# Patient Record
Sex: MALE | Race: OTHER | Employment: FULL TIME | URBAN - METROPOLITAN AREA
[De-identification: names, ages, dates, MRNs, and addresses within clinical notes are randomized per-mention and may not be internally consistent; named-entity substitution may affect disease eponyms.]

---

## 2020-03-15 ENCOUNTER — HOSPITAL ENCOUNTER (EMERGENCY)
Age: 50
Discharge: HOME | End: 2020-03-15
Attending: EMERGENCY MEDICINE
Payer: COMMERCIAL

## 2020-03-15 ENCOUNTER — APPOINTMENT (OUTPATIENT)
Dept: GENERAL RADIOLOGY | Age: 50
End: 2020-03-15
Payer: COMMERCIAL

## 2020-03-15 VITALS
HEIGHT: 69 IN | WEIGHT: 241.3 LBS | DIASTOLIC BLOOD PRESSURE: 88 MMHG | HEART RATE: 78 BPM | OXYGEN SATURATION: 97 % | BODY MASS INDEX: 35.74 KG/M2 | RESPIRATION RATE: 18 BRPM | TEMPERATURE: 97.8 F | SYSTOLIC BLOOD PRESSURE: 134 MMHG

## 2020-03-15 PROCEDURE — 72040 X-RAY EXAM NECK SPINE 2-3 VW: CPT

## 2020-03-15 PROCEDURE — 71046 X-RAY EXAM CHEST 2 VIEWS: CPT

## 2020-03-15 PROCEDURE — 99283 EMERGENCY DEPT VISIT LOW MDM: CPT

## 2020-03-15 PROCEDURE — 72100 X-RAY EXAM L-S SPINE 2/3 VWS: CPT

## 2020-03-15 RX ORDER — IBUPROFEN 800 MG/1
800 TABLET ORAL EVERY 6 HOURS PRN
Qty: 21 TABLET | Refills: 0 | Status: ON HOLD | OUTPATIENT
Start: 2020-03-15 | End: 2020-09-23

## 2020-03-15 RX ORDER — ACETAMINOPHEN AND CODEINE PHOSPHATE 300; 30 MG/1; MG/1
1 TABLET ORAL EVERY 6 HOURS PRN
Qty: 12 TABLET | Refills: 0 | Status: SHIPPED | OUTPATIENT
Start: 2020-03-15 | End: 2020-03-18

## 2020-03-15 RX ORDER — METHOCARBAMOL 750 MG/1
750 TABLET, FILM COATED ORAL 4 TIMES DAILY
Qty: 40 TABLET | Refills: 0 | Status: SHIPPED | OUTPATIENT
Start: 2020-03-15 | End: 2020-03-25

## 2020-03-15 ASSESSMENT — PAIN DESCRIPTION - PAIN TYPE: TYPE: ACUTE PAIN

## 2020-03-15 ASSESSMENT — ENCOUNTER SYMPTOMS
COLOR CHANGE: 0
COUGH: 0
RHINORRHEA: 0
SHORTNESS OF BREATH: 0
SINUS PRESSURE: 0
VOMITING: 0
SORE THROAT: 0
ABDOMINAL PAIN: 0
WHEEZING: 0
NAUSEA: 0
DIARRHEA: 0
BACK PAIN: 1
CONSTIPATION: 0

## 2020-03-15 ASSESSMENT — PAIN SCALES - GENERAL: PAINLEVEL_OUTOF10: 10

## 2020-03-15 ASSESSMENT — PAIN DESCRIPTION - LOCATION: LOCATION: BACK;NECK

## 2020-03-15 ASSESSMENT — PAIN DESCRIPTION - DESCRIPTORS: DESCRIPTORS: THROBBING;ACHING

## 2020-03-15 ASSESSMENT — PAIN DESCRIPTION - ORIENTATION: ORIENTATION: LOWER

## 2020-03-15 ASSESSMENT — PAIN DESCRIPTION - FREQUENCY: FREQUENCY: CONTINUOUS
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
## 2020-09-22 ENCOUNTER — HOSPITAL ENCOUNTER (INPATIENT)
Age: 50
LOS: 4 days | Discharge: HOME HEALTH | DRG: 137 | End: 2020-09-26
Attending: INTERNAL MEDICINE | Admitting: INTERNAL MEDICINE
Payer: MEDICAID

## 2020-09-22 ENCOUNTER — APPOINTMENT (OUTPATIENT)
Dept: GENERAL RADIOLOGY | Age: 50
DRG: 137 | End: 2020-09-22
Attending: INTERNAL MEDICINE
Payer: MEDICAID

## 2020-09-22 PROBLEM — J18.9 MULTIFOCAL PNEUMONIA: Status: ACTIVE | Noted: 2020-09-22

## 2020-09-22 PROBLEM — K81.0 ACUTE CHOLECYSTITIS: Status: ACTIVE | Noted: 2020-09-22

## 2020-09-22 PROBLEM — J85.1 ABSCESS OF LOWER LOBE OF RIGHT LUNG WITH PNEUMONIA (HCC): Status: ACTIVE | Noted: 2020-09-22

## 2020-09-22 PROBLEM — E66.9 DIABETES MELLITUS TYPE 2 IN OBESE (HCC): Status: ACTIVE | Noted: 2020-09-22

## 2020-09-22 PROBLEM — J18.9 PNEUMONIA: Status: ACTIVE | Noted: 2020-09-22

## 2020-09-22 PROBLEM — E66.09 OBESITY DUE TO EXCESS CALORIES: Status: ACTIVE | Noted: 2020-09-22

## 2020-09-22 PROBLEM — E11.69 DIABETES MELLITUS TYPE 2 IN OBESE (HCC): Status: ACTIVE | Noted: 2020-09-22

## 2020-09-22 LAB
CREAT SERPL-MCNC: 0.47 MG/DL (ref 0.7–1.2)
GFR AFRICAN AMERICAN: >60 ML/MIN
GFR NON-AFRICAN AMERICAN: >60 ML/MIN
GFR SERPL CREATININE-BSD FRML MDRD: ABNORMAL ML/MIN/{1.73_M2}
GFR SERPL CREATININE-BSD FRML MDRD: ABNORMAL ML/MIN/{1.73_M2}
GLUCOSE BLD-MCNC: 224 MG/DL (ref 75–110)
SARS-COV-2, RAPID: NOT DETECTED
SARS-COV-2: NORMAL
SARS-COV-2: NORMAL
SOURCE: NORMAL

## 2020-09-22 PROCEDURE — 82565 ASSAY OF CREATININE: CPT

## 2020-09-22 PROCEDURE — 6370000000 HC RX 637 (ALT 250 FOR IP): Performed by: NURSE PRACTITIONER

## 2020-09-22 PROCEDURE — 2500000003 HC RX 250 WO HCPCS: Performed by: SURGERY

## 2020-09-22 PROCEDURE — 86481 TB AG RESPONSE T-CELL SUSP: CPT

## 2020-09-22 PROCEDURE — 87641 MR-STAPH DNA AMP PROBE: CPT

## 2020-09-22 PROCEDURE — 2060000000 HC ICU INTERMEDIATE R&B

## 2020-09-22 PROCEDURE — 36415 COLL VENOUS BLD VENIPUNCTURE: CPT

## 2020-09-22 PROCEDURE — 2580000003 HC RX 258: Performed by: INTERNAL MEDICINE

## 2020-09-22 PROCEDURE — 87040 BLOOD CULTURE FOR BACTERIA: CPT

## 2020-09-22 PROCEDURE — 2580000003 HC RX 258: Performed by: NURSE PRACTITIONER

## 2020-09-22 PROCEDURE — 82947 ASSAY GLUCOSE BLOOD QUANT: CPT

## 2020-09-22 PROCEDURE — 6360000002 HC RX W HCPCS: Performed by: INTERNAL MEDICINE

## 2020-09-22 PROCEDURE — U0002 COVID-19 LAB TEST NON-CDC: HCPCS

## 2020-09-22 PROCEDURE — 71045 X-RAY EXAM CHEST 1 VIEW: CPT

## 2020-09-22 PROCEDURE — 6360000002 HC RX W HCPCS: Performed by: NURSE PRACTITIONER

## 2020-09-22 PROCEDURE — 99223 1ST HOSP IP/OBS HIGH 75: CPT | Performed by: INTERNAL MEDICINE

## 2020-09-22 PROCEDURE — 6360000002 HC RX W HCPCS: Performed by: SURGERY

## 2020-09-22 RX ORDER — SODIUM CHLORIDE 9 MG/ML
INJECTION, SOLUTION INTRAVENOUS CONTINUOUS
Status: DISCONTINUED | OUTPATIENT
Start: 2020-09-22 | End: 2020-09-24

## 2020-09-22 RX ORDER — SODIUM CHLORIDE 0.9 % (FLUSH) 0.9 %
10 SYRINGE (ML) INJECTION PRN
Status: DISCONTINUED | OUTPATIENT
Start: 2020-09-22 | End: 2020-09-26 | Stop reason: HOSPADM

## 2020-09-22 RX ORDER — SODIUM CHLORIDE 0.9 % (FLUSH) 0.9 %
10 SYRINGE (ML) INJECTION EVERY 12 HOURS SCHEDULED
Status: DISCONTINUED | OUTPATIENT
Start: 2020-09-22 | End: 2020-09-26 | Stop reason: HOSPADM

## 2020-09-22 RX ORDER — PROMETHAZINE HYDROCHLORIDE 12.5 MG/1
12.5 TABLET ORAL EVERY 6 HOURS PRN
Status: DISCONTINUED | OUTPATIENT
Start: 2020-09-22 | End: 2020-09-26 | Stop reason: HOSPADM

## 2020-09-22 RX ORDER — ONDANSETRON 2 MG/ML
4 INJECTION INTRAMUSCULAR; INTRAVENOUS EVERY 6 HOURS PRN
Status: DISCONTINUED | OUTPATIENT
Start: 2020-09-22 | End: 2020-09-26 | Stop reason: HOSPADM

## 2020-09-22 RX ORDER — DEXTROSE MONOHYDRATE 25 G/50ML
12.5 INJECTION, SOLUTION INTRAVENOUS PRN
Status: DISCONTINUED | OUTPATIENT
Start: 2020-09-22 | End: 2020-09-26 | Stop reason: HOSPADM

## 2020-09-22 RX ORDER — LEVOFLOXACIN 5 MG/ML
750 INJECTION, SOLUTION INTRAVENOUS EVERY 24 HOURS
Status: DISCONTINUED | OUTPATIENT
Start: 2020-09-22 | End: 2020-09-23

## 2020-09-22 RX ORDER — LEVOFLOXACIN 5 MG/ML
750 INJECTION, SOLUTION INTRAVENOUS ONCE
Status: DISCONTINUED | OUTPATIENT
Start: 2020-09-22 | End: 2020-09-22 | Stop reason: SDUPTHER

## 2020-09-22 RX ORDER — IPRATROPIUM BROMIDE AND ALBUTEROL SULFATE 2.5; .5 MG/3ML; MG/3ML
1 SOLUTION RESPIRATORY (INHALATION)
Status: DISCONTINUED | OUTPATIENT
Start: 2020-09-22 | End: 2020-09-23

## 2020-09-22 RX ORDER — ACETAMINOPHEN 325 MG/1
650 TABLET ORAL EVERY 6 HOURS PRN
Status: DISCONTINUED | OUTPATIENT
Start: 2020-09-22 | End: 2020-09-26 | Stop reason: HOSPADM

## 2020-09-22 RX ORDER — MORPHINE SULFATE 2 MG/ML
2 INJECTION, SOLUTION INTRAMUSCULAR; INTRAVENOUS
Status: DISCONTINUED | OUTPATIENT
Start: 2020-09-22 | End: 2020-09-24

## 2020-09-22 RX ORDER — ALBUTEROL SULFATE 2.5 MG/3ML
2.5 SOLUTION RESPIRATORY (INHALATION)
Status: DISCONTINUED | OUTPATIENT
Start: 2020-09-22 | End: 2020-09-23

## 2020-09-22 RX ORDER — DEXTROSE MONOHYDRATE 50 MG/ML
100 INJECTION, SOLUTION INTRAVENOUS PRN
Status: DISCONTINUED | OUTPATIENT
Start: 2020-09-22 | End: 2020-09-26 | Stop reason: HOSPADM

## 2020-09-22 RX ORDER — NICOTINE 21 MG/24HR
1 PATCH, TRANSDERMAL 24 HOURS TRANSDERMAL DAILY PRN
Status: DISCONTINUED | OUTPATIENT
Start: 2020-09-22 | End: 2020-09-26 | Stop reason: HOSPADM

## 2020-09-22 RX ORDER — NICOTINE POLACRILEX 4 MG
15 LOZENGE BUCCAL PRN
Status: DISCONTINUED | OUTPATIENT
Start: 2020-09-22 | End: 2020-09-26 | Stop reason: HOSPADM

## 2020-09-22 RX ORDER — ACETAMINOPHEN 650 MG/1
650 SUPPOSITORY RECTAL EVERY 6 HOURS PRN
Status: DISCONTINUED | OUTPATIENT
Start: 2020-09-22 | End: 2020-09-26 | Stop reason: HOSPADM

## 2020-09-22 RX ADMIN — ACETAMINOPHEN 650 MG: 325 TABLET ORAL at 21:32

## 2020-09-22 RX ADMIN — INSULIN LISPRO 1 UNITS: 100 INJECTION, SOLUTION INTRAVENOUS; SUBCUTANEOUS at 22:40

## 2020-09-22 RX ADMIN — MORPHINE SULFATE 2 MG: 2 INJECTION, SOLUTION INTRAMUSCULAR; INTRAVENOUS at 21:33

## 2020-09-22 RX ADMIN — VANCOMYCIN HYDROCHLORIDE 2000 MG: 1 INJECTION, POWDER, LYOPHILIZED, FOR SOLUTION INTRAVENOUS at 21:32

## 2020-09-22 RX ADMIN — FAMOTIDINE 20 MG: 10 INJECTION INTRAVENOUS at 21:33

## 2020-09-22 RX ADMIN — SODIUM CHLORIDE: 9 INJECTION, SOLUTION INTRAVENOUS at 21:31

## 2020-09-22 RX ADMIN — Medication 10 ML: at 21:33

## 2020-09-22 RX ADMIN — ENOXAPARIN SODIUM 40 MG: 40 INJECTION SUBCUTANEOUS at 22:40

## 2020-09-22 ASSESSMENT — PAIN DESCRIPTION - FREQUENCY: FREQUENCY: CONTINUOUS

## 2020-09-22 ASSESSMENT — PAIN SCALES - GENERAL
PAINLEVEL_OUTOF10: 0
PAINLEVEL_OUTOF10: 8

## 2020-09-22 ASSESSMENT — PAIN DESCRIPTION - ONSET: ONSET: ON-GOING

## 2020-09-22 NOTE — H&P
[facility]  Office: [address], DO, Annetta Leesusie, DO, Yadiracristino Saravia, DO, Hyndman Nikita Blood, DO, Winter Leal MD, Mary Juárez MD, Selvin Proctor MD, Lili Man MD, Afshin Castro MD, Javier Schwarz MD, Mirna Mejia MD, Ernesto Smith MD, João Farmer MD, Beckie Harris, DO, Gianfranco Judd MD, David Bangura MD, Daisy Agustin DO, Phani Zimmerman MD,  Solomon Arias DO, Blanco Li MD, Talisha Au MD, Caleb Cochran, [facility], [facility], CNP, Daisy Macias, CNP, Kulwant Posada, CNS, Mary Camacho, CNP, Joseph Ac, CNP, Alicja Navarrete, CNP, Hemanth Juarez, CNP, Joe Tamayo, CNP, Pancho Butcher PA-C, Jazmin Amaya, LAZ, Christina Brito, CNP, Agustín Acosta, CNP, Aspen Burgos, CNP, Marita Antonio, CNP, Kenia Bess, [facility]   [address] [facility]    HISTORY AND PHYSICAL EXAMINATION            Date:   9/22/2020  Patient name:  Myranda Dick  Date of admission:  9/22/2020  5:46 PM  MRN:   [id number]  Account:  [de-identified]  YOB: 1970  PCP:    Charla Monk MD  Room:   [address] [place] [address]  Code Status:    Full Code    Chief Complaint:     No chief complaint on file. SOB/COUGH    History Obtained From:     patient    History of Present Illness:     Myranda Dick is a 45 y.o. / male who presents with No chief complaint on file. and is admitted to the hospital for the management of Abscess of lower lobe of right lung with pneumonia ([facility] [address] 59-year-old gentleman with underlying history of diabetes, morbid obesity, cough with pneumonia in July, recovered, started having subsequent cough and has been seen by   3 weeks back and sent home, but then came back with the right upper quadrant abdominal pain and shortness of breath was admitted to [facility] for lung abscess and acute cholecystitis. Transferred here today for further care.   General surgery, infectious disease, pulmonary consulted,  Patient sitting in the bed when I saw, still coughing, still having right upper quadrant abdominal pain,      Past Medical History:     Past Medical History:   Diagnosis Date    Diabetes mellitus ([address]         Past Surgical History:     No past surgical history on file. Medications Prior to Admission:     Prior to Admission medications    Medication Sig Start Date End Date Taking? Authorizing Provider   metFORMIN (GLUCOPHAGE) 500 MG tablet Take 500 mg by mouth 2 times daily (with meals)    Historical Provider, MD   ibuprofen (IBU) 800 MG tablet Take 1 tablet by mouth every 6 hours as needed for Pain 3/15/20   FLORENCE Singleton - CNP        Allergies:     Sulfa antibiotics    Social History:     Tobacco:    reports that he has never smoked. He has never used smokeless tobacco.  Alcohol:      has no history on file for alcohol. Drug Use:  has no history on file for drug. Family History:     No family history on file. Review of Systems:     Positive and Negative as described in HPI.     CONSTITUTIONAL:  negative for fevers, chills, sweats, fatigue, weight loss  HEENT:  negative for vision, hearing changes, runny nose, throat pain  RESPIRATORY: Cough and shortness of breath  CARDIOVASCULAR:  negative for chest pain, palpitations  GASTROINTESTINAL: Nausea and abdominal pain  GENITOURINARY:  negative for difficulty of urination, burning with urination, frequency   INTEGUMENT:  negative for rash, skin lesions, easy bruising   HEMATOLOGIC/LYMPHATIC:  negative for swelling/edema   ALLERGIC/IMMUNOLOGIC:  negative for urticaria , itching  ENDOCRINE:  negative increase in drinking, increase in urination, hot or cold intolerance  MUSCULOSKELETAL:  negative joint pains, muscle aches, swelling of joints  NEUROLOGICAL:  negative for headaches, dizziness, lightheadedness, numbness, pain, tingling extremities  BEHAVIOR/PSYCH:  negative for depression, anxiety    Physical Exam:   /76   Pulse 89   Temp lung abscess of the right lower lobe, was admitted to [facility], seen by pulmonary and infectious disease, initially he was on vancomycin, cefepime and Flagyl, subsequently changed to [address], will continue that at this time, infectious disease on board, also pulmonary on board, breathing treatments,  2. Acute cholecystitis, patient was seen by general surgery, they were planning on doing a HIDA scan, due to above did not want to take the patient for surgery at this time, will consult general surgery and will await their input,  3. Multifocal pneumonia, broad-spectrum antibiotic, blood cultures, sputum cultures, pending,  4. Pain control,  5. Diabetes mellitus type 2, continue to monitor sugars, insulin sliding scale, will hold metformin,  6. DVT and GI prophylaxis,  7. Full CODE STATUS    Consultations:   IP CONSULT TO PULMONOLOGY  IP CONSULT TO PHARMACY  IP CONSULT TO GENERAL SURGERY  IP CONSULT TO INFECTIOUS DISEASES     Patient is admitted as inpatient status because of co-morbidities listed above, severity of signs and symptoms as outlined, requirement for current medical therapies and most importantly because of direct risk to patient if care not provided in a hospital setting. Expected length of stay > 48 hours.     Catrachito Huang MD  9/22/2020  6:52 PM    Copy sent to Dr. Cheryl Gutierres MD

## 2020-09-22 NOTE — PROGRESS NOTES
Patient admitted to PCU room 1017-1 from [address] [place] [address]. Oriented to room and call light. Connected to heart monitor and IVF. No distress noted. Dr. Wallace Rao at station and updated on patient. He is reviewing paperwork sent over from [address] [place] [address] Orders to follow.

## 2020-09-23 ENCOUNTER — APPOINTMENT (OUTPATIENT)
Dept: GENERAL RADIOLOGY | Age: 50
DRG: 137 | End: 2020-09-23
Attending: INTERNAL MEDICINE
Payer: MEDICAID

## 2020-09-23 ENCOUNTER — APPOINTMENT (OUTPATIENT)
Dept: CT IMAGING | Age: 50
DRG: 137 | End: 2020-09-23
Attending: INTERNAL MEDICINE
Payer: MEDICAID

## 2020-09-23 ENCOUNTER — APPOINTMENT (OUTPATIENT)
Dept: INTERVENTIONAL RADIOLOGY/VASCULAR | Age: 50
DRG: 137 | End: 2020-09-23
Attending: INTERNAL MEDICINE
Payer: MEDICAID

## 2020-09-23 PROBLEM — Z91.89: Status: ACTIVE | Noted: 2020-09-23

## 2020-09-23 LAB
-: NORMAL
ANION GAP SERPL CALCULATED.3IONS-SCNC: 9 MMOL/L (ref 9–17)
BUN BLDV-MCNC: 6 MG/DL (ref 6–20)
BUN/CREAT BLD: 12 (ref 9–20)
CALCIUM SERPL-MCNC: 8.3 MG/DL (ref 8.6–10.4)
CHLORIDE BLD-SCNC: 102 MMOL/L (ref 98–107)
CO2: 24 MMOL/L (ref 20–31)
CREAT SERPL-MCNC: 0.49 MG/DL (ref 0.7–1.2)
CULTURE: NORMAL
DIRECT EXAM: NORMAL
ESTIMATED AVERAGE GLUCOSE: 335 MG/DL
GFR AFRICAN AMERICAN: >60 ML/MIN
GFR NON-AFRICAN AMERICAN: >60 ML/MIN
GFR SERPL CREATININE-BSD FRML MDRD: ABNORMAL ML/MIN/{1.73_M2}
GFR SERPL CREATININE-BSD FRML MDRD: ABNORMAL ML/MIN/{1.73_M2}
GLUCOSE BLD-MCNC: 112 MG/DL (ref 75–110)
GLUCOSE BLD-MCNC: 184 MG/DL (ref 75–110)
GLUCOSE BLD-MCNC: 205 MG/DL (ref 70–99)
GLUCOSE BLD-MCNC: 244 MG/DL (ref 75–110)
GLUCOSE BLD-MCNC: 246 MG/DL (ref 75–110)
HBA1C MFR BLD: 13.3 % (ref 4–6)
HCT VFR BLD CALC: 36.2 % (ref 40.7–50.3)
HEMOGLOBIN: 11.7 G/DL (ref 13–17)
HIV AG/AB: NONREACTIVE
INR BLD: 1.3
Lab: NORMAL
MCH RBC QN AUTO: 31 PG (ref 25.2–33.5)
MCHC RBC AUTO-ENTMCNC: 32.3 G/DL (ref 28.4–34.8)
MCV RBC AUTO: 96 FL (ref 82.6–102.9)
MRSA, DNA, NASAL: ABNORMAL
NRBC AUTOMATED: 0 PER 100 WBC
PARTIAL THROMBOPLASTIN TIME: 41 SEC (ref 23.9–33.8)
PDW BLD-RTO: 13.3 % (ref 11.8–14.4)
PLATELET # BLD: 269 K/UL (ref 138–453)
PMV BLD AUTO: 9.7 FL (ref 8.1–13.5)
POTASSIUM SERPL-SCNC: 3.8 MMOL/L (ref 3.7–5.3)
PROTHROMBIN TIME: 15.6 SEC (ref 11.5–14.2)
RBC # BLD: 3.77 M/UL (ref 4.21–5.77)
REASON FOR REJECTION: NORMAL
SODIUM BLD-SCNC: 135 MMOL/L (ref 135–144)
SPECIMEN DESCRIPTION: ABNORMAL
SPECIMEN DESCRIPTION: NORMAL
VANCOMYCIN TROUGH DATE LAST DOSE: ABNORMAL
VANCOMYCIN TROUGH DOSE AMOUNT: ABNORMAL
VANCOMYCIN TROUGH TIME LAST DOSE: ABNORMAL
VANCOMYCIN TROUGH: 9.9 UG/ML (ref 10–20)
WBC # BLD: 8.2 K/UL (ref 3.5–11.3)
ZZ NTE CLEAN UP: ORDERED TEST: NORMAL
ZZ NTE WITH NAME CLEAN UP: SPECIMEN SOURCE: NORMAL

## 2020-09-23 PROCEDURE — 71045 X-RAY EXAM CHEST 1 VIEW: CPT

## 2020-09-23 PROCEDURE — 80202 ASSAY OF VANCOMYCIN: CPT

## 2020-09-23 PROCEDURE — 87070 CULTURE OTHR SPECIMN AEROBIC: CPT

## 2020-09-23 PROCEDURE — 6360000002 HC RX W HCPCS: Performed by: INTERNAL MEDICINE

## 2020-09-23 PROCEDURE — 86481 TB AG RESPONSE T-CELL SUSP: CPT

## 2020-09-23 PROCEDURE — 80048 BASIC METABOLIC PNL TOTAL CA: CPT

## 2020-09-23 PROCEDURE — 94761 N-INVAS EAR/PLS OXIMETRY MLT: CPT

## 2020-09-23 PROCEDURE — 94640 AIRWAY INHALATION TREATMENT: CPT

## 2020-09-23 PROCEDURE — C1729 CATH, DRAINAGE: HCPCS

## 2020-09-23 PROCEDURE — 2580000003 HC RX 258: Performed by: INTERNAL MEDICINE

## 2020-09-23 PROCEDURE — 85730 THROMBOPLASTIN TIME PARTIAL: CPT

## 2020-09-23 PROCEDURE — 71250 CT THORAX DX C-: CPT

## 2020-09-23 PROCEDURE — 85027 COMPLETE CBC AUTOMATED: CPT

## 2020-09-23 PROCEDURE — 99222 1ST HOSP IP/OBS MODERATE 55: CPT | Performed by: THORACIC SURGERY (CARDIOTHORACIC VASCULAR SURGERY)

## 2020-09-23 PROCEDURE — 87075 CULTR BACTERIA EXCEPT BLOOD: CPT

## 2020-09-23 PROCEDURE — 47490 INCISION OF GALLBLADDER: CPT | Performed by: RADIOLOGY

## 2020-09-23 PROCEDURE — 6360000002 HC RX W HCPCS: Performed by: RADIOLOGY

## 2020-09-23 PROCEDURE — 99255 IP/OBS CONSLTJ NEW/EST HI 80: CPT | Performed by: NURSE PRACTITIONER

## 2020-09-23 PROCEDURE — 82947 ASSAY GLUCOSE BLOOD QUANT: CPT

## 2020-09-23 PROCEDURE — 87205 SMEAR GRAM STAIN: CPT

## 2020-09-23 PROCEDURE — 6370000000 HC RX 637 (ALT 250 FOR IP): Performed by: NURSE PRACTITIONER

## 2020-09-23 PROCEDURE — 6360000002 HC RX W HCPCS: Performed by: SURGERY

## 2020-09-23 PROCEDURE — 99254 IP/OBS CNSLTJ NEW/EST MOD 60: CPT | Performed by: INTERNAL MEDICINE

## 2020-09-23 PROCEDURE — 0F9430Z DRAINAGE OF GALLBLADDER WITH DRAINAGE DEVICE, PERCUTANEOUS APPROACH: ICD-10-PCS | Performed by: RADIOLOGY

## 2020-09-23 PROCEDURE — 2500000003 HC RX 250 WO HCPCS: Performed by: SURGERY

## 2020-09-23 PROCEDURE — 6360000004 HC RX CONTRAST MEDICATION: Performed by: RADIOLOGY

## 2020-09-23 PROCEDURE — 36415 COLL VENOUS BLD VENIPUNCTURE: CPT

## 2020-09-23 PROCEDURE — 6370000000 HC RX 637 (ALT 250 FOR IP): Performed by: INTERNAL MEDICINE

## 2020-09-23 PROCEDURE — 99232 SBSQ HOSP IP/OBS MODERATE 35: CPT | Performed by: INTERNAL MEDICINE

## 2020-09-23 PROCEDURE — 2060000000 HC ICU INTERMEDIATE R&B

## 2020-09-23 PROCEDURE — 85610 PROTHROMBIN TIME: CPT

## 2020-09-23 PROCEDURE — 87389 HIV-1 AG W/HIV-1&-2 AB AG IA: CPT

## 2020-09-23 PROCEDURE — 6360000002 HC RX W HCPCS: Performed by: NURSE PRACTITIONER

## 2020-09-23 PROCEDURE — 2580000003 HC RX 258: Performed by: NURSE PRACTITIONER

## 2020-09-23 PROCEDURE — 83036 HEMOGLOBIN GLYCOSYLATED A1C: CPT

## 2020-09-23 RX ORDER — SODIUM CHLORIDE 0.9 % (FLUSH) 0.9 %
10 SYRINGE (ML) INJECTION EVERY 12 HOURS SCHEDULED
Status: CANCELLED | OUTPATIENT
Start: 2020-09-23

## 2020-09-23 RX ORDER — IBUPROFEN 800 MG/1
800 TABLET ORAL EVERY 8 HOURS PRN
COMMUNITY

## 2020-09-23 RX ORDER — CHLORHEXIDINE GLUCONATE 0.12 MG/ML
15 RINSE ORAL ONCE
Status: CANCELLED | OUTPATIENT
Start: 2020-09-23 | End: 2020-09-23

## 2020-09-23 RX ORDER — ALBUTEROL SULFATE 90 UG/1
2 AEROSOL, METERED RESPIRATORY (INHALATION) EVERY 6 HOURS PRN
COMMUNITY
End: 2022-06-24 | Stop reason: SDUPTHER

## 2020-09-23 RX ORDER — CEFAZOLIN SODIUM 2 G/50ML
2 SOLUTION INTRAVENOUS
Status: CANCELLED | OUTPATIENT
Start: 2020-09-23 | End: 2020-09-23

## 2020-09-23 RX ORDER — SODIUM CHLORIDE, SODIUM LACTATE, POTASSIUM CHLORIDE, CALCIUM CHLORIDE 600; 310; 30; 20 MG/100ML; MG/100ML; MG/100ML; MG/100ML
INJECTION, SOLUTION INTRAVENOUS CONTINUOUS
Status: CANCELLED | OUTPATIENT
Start: 2020-09-23

## 2020-09-23 RX ORDER — FENTANYL CITRATE 50 UG/ML
INJECTION, SOLUTION INTRAMUSCULAR; INTRAVENOUS
Status: COMPLETED | OUTPATIENT
Start: 2020-09-23 | End: 2020-09-23

## 2020-09-23 RX ORDER — INSULIN GLARGINE 100 [IU]/ML
12 INJECTION, SOLUTION SUBCUTANEOUS 2 TIMES DAILY
COMMUNITY
End: 2020-11-19

## 2020-09-23 RX ORDER — ALBUTEROL SULFATE 90 UG/1
2 AEROSOL, METERED RESPIRATORY (INHALATION) EVERY 6 HOURS PRN
Status: DISCONTINUED | OUTPATIENT
Start: 2020-09-23 | End: 2020-09-26 | Stop reason: HOSPADM

## 2020-09-23 RX ORDER — INSULIN GLARGINE 100 [IU]/ML
12 INJECTION, SOLUTION SUBCUTANEOUS DAILY
Status: DISCONTINUED | OUTPATIENT
Start: 2020-09-23 | End: 2020-09-26 | Stop reason: HOSPADM

## 2020-09-23 RX ORDER — CHLORHEXIDINE GLUCONATE 4 G/100ML
SOLUTION TOPICAL SEE ADMIN INSTRUCTIONS
Status: CANCELLED | OUTPATIENT
Start: 2020-09-23

## 2020-09-23 RX ORDER — GUAIFENESIN 600 MG/1
600 TABLET, EXTENDED RELEASE ORAL 2 TIMES DAILY
Status: DISCONTINUED | OUTPATIENT
Start: 2020-09-23 | End: 2020-09-26 | Stop reason: HOSPADM

## 2020-09-23 RX ORDER — SODIUM CHLORIDE 0.9 % (FLUSH) 0.9 %
10 SYRINGE (ML) INJECTION PRN
Status: CANCELLED | OUTPATIENT
Start: 2020-09-23

## 2020-09-23 RX ADMIN — FAMOTIDINE 20 MG: 10 INJECTION INTRAVENOUS at 21:25

## 2020-09-23 RX ADMIN — IOPAMIDOL 10 ML: 612 INJECTION, SOLUTION INTRAVENOUS at 16:10

## 2020-09-23 RX ADMIN — SODIUM CHLORIDE: 9 INJECTION, SOLUTION INTRAVENOUS at 08:17

## 2020-09-23 RX ADMIN — INSULIN GLARGINE 12 UNITS: 100 INJECTION, SOLUTION SUBCUTANEOUS at 16:53

## 2020-09-23 RX ADMIN — MORPHINE SULFATE 2 MG: 2 INJECTION, SOLUTION INTRAMUSCULAR; INTRAVENOUS at 00:03

## 2020-09-23 RX ADMIN — PIPERACILLIN AND TAZOBACTAM 3.38 G: 3; .375 INJECTION, POWDER, LYOPHILIZED, FOR SOLUTION INTRAVENOUS at 21:23

## 2020-09-23 RX ADMIN — MORPHINE SULFATE 2 MG: 2 INJECTION, SOLUTION INTRAMUSCULAR; INTRAVENOUS at 18:35

## 2020-09-23 RX ADMIN — MORPHINE SULFATE 2 MG: 2 INJECTION, SOLUTION INTRAMUSCULAR; INTRAVENOUS at 21:31

## 2020-09-23 RX ADMIN — MORPHINE SULFATE 2 MG: 2 INJECTION, SOLUTION INTRAMUSCULAR; INTRAVENOUS at 20:16

## 2020-09-23 RX ADMIN — MORPHINE SULFATE 2 MG: 2 INJECTION, SOLUTION INTRAMUSCULAR; INTRAVENOUS at 05:35

## 2020-09-23 RX ADMIN — INSULIN LISPRO 2 UNITS: 100 INJECTION, SOLUTION INTRAVENOUS; SUBCUTANEOUS at 12:13

## 2020-09-23 RX ADMIN — GUAIFENESIN 600 MG: 600 TABLET, EXTENDED RELEASE ORAL at 21:26

## 2020-09-23 RX ADMIN — LEVOFLOXACIN 750 MG: 5 INJECTION, SOLUTION INTRAVENOUS at 00:07

## 2020-09-23 RX ADMIN — MORPHINE SULFATE 2 MG: 2 INJECTION, SOLUTION INTRAMUSCULAR; INTRAVENOUS at 10:38

## 2020-09-23 RX ADMIN — Medication 10 ML: at 21:25

## 2020-09-23 RX ADMIN — Medication 50 MCG: at 16:12

## 2020-09-23 RX ADMIN — FAMOTIDINE 20 MG: 10 INJECTION INTRAVENOUS at 08:18

## 2020-09-23 RX ADMIN — IPRATROPIUM BROMIDE AND ALBUTEROL SULFATE 1 AMPULE: .5; 3 SOLUTION RESPIRATORY (INHALATION) at 07:46

## 2020-09-23 RX ADMIN — INSULIN LISPRO 1 UNITS: 100 INJECTION, SOLUTION INTRAVENOUS; SUBCUTANEOUS at 21:22

## 2020-09-23 RX ADMIN — PIPERACILLIN SODIUM AND TAZOBACTAM SODIUM 4.5 G: 4; .5 INJECTION, POWDER, LYOPHILIZED, FOR SOLUTION INTRAVENOUS at 16:53

## 2020-09-23 RX ADMIN — VANCOMYCIN HYDROCHLORIDE 2000 MG: 1 INJECTION, POWDER, LYOPHILIZED, FOR SOLUTION INTRAVENOUS at 08:17

## 2020-09-23 RX ADMIN — MORPHINE SULFATE 2 MG: 2 INJECTION, SOLUTION INTRAMUSCULAR; INTRAVENOUS at 08:18

## 2020-09-23 RX ADMIN — MORPHINE SULFATE 2 MG: 2 INJECTION, SOLUTION INTRAMUSCULAR; INTRAVENOUS at 23:59

## 2020-09-23 RX ADMIN — MORPHINE SULFATE 2 MG: 2 INJECTION, SOLUTION INTRAMUSCULAR; INTRAVENOUS at 12:14

## 2020-09-23 RX ADMIN — Medication 10 ML: at 08:18

## 2020-09-23 RX ADMIN — Medication 50 MCG: at 16:10

## 2020-09-23 ASSESSMENT — PAIN SCALES - GENERAL
PAINLEVEL_OUTOF10: 3
PAINLEVEL_OUTOF10: 7
PAINLEVEL_OUTOF10: 4
PAINLEVEL_OUTOF10: 8
PAINLEVEL_OUTOF10: 9
PAINLEVEL_OUTOF10: 8
PAINLEVEL_OUTOF10: 10
PAINLEVEL_OUTOF10: 7
PAINLEVEL_OUTOF10: 8
PAINLEVEL_OUTOF10: 5
PAINLEVEL_OUTOF10: 8
PAINLEVEL_OUTOF10: 6
PAINLEVEL_OUTOF10: 8
PAINLEVEL_OUTOF10: 8
PAINLEVEL_OUTOF10: 9

## 2020-09-23 ASSESSMENT — PAIN DESCRIPTION - FREQUENCY: FREQUENCY: CONTINUOUS

## 2020-09-23 ASSESSMENT — PAIN DESCRIPTION - PROGRESSION: CLINICAL_PROGRESSION: GRADUALLY IMPROVING

## 2020-09-23 ASSESSMENT — PAIN DESCRIPTION - ONSET: ONSET: ON-GOING

## 2020-09-23 NOTE — PROGRESS NOTES
[facility]  Office: [address], DO, Juvelorraine Pollock, DO, Carey Cunningham, DO, Scarlettzo Edward, DO, Adam Bee MD, Bessy Becker MD, Syed Austin MD, Twan Patrick MD, Fabian Brito MD, Sandra Mane MD, Skyler Bedolla MD, Lopez Rivera MD, João Bingham MD, Cathy Angeles, DO, Rufina Simmons MD, Lila Ambriz MD, Kyle Posada DO, Catarino Ventura MD,  Consuelo Hdz DO, Matti Shah MD, Latrice Joseph MD, Ella Estrada, [facility], [facility], CNP, Javed Miller, CNP, Iliana Walter, CNS, Vipin Robles, CNP, Rajiv Hobbs, CNP, José Luis King, CNP, Inez Forde, CNP, Vic Galo, CNP, Seven Tran PA-C, Della Avila, [facility], Joesph Greenwood, CNP, Bailee Rodriguez, CNP, Leandra Marvin, CNP, Stephanie Longoria, [facility], [facility], [address] [place] [address]    Progress Note    9/23/2020    1:16 PM    Name:   Marvin Diaz  MRN:     [id number]     Acct:      [de-identified]   Room:   [address] [place] [address] Day:  1  Admit Date:  9/22/2020  5:46 PM    PCP:   Lam Alcala MD  Code Status:  Full Code    Subjective:     C/C: No chief complaint on file. SOB/COUGH  Interval History Status: not changed.      Seen and examined face-to-face this morning  Still coughing and still short of breath,  Complaining of right upper quadrant abdominal pain,  Acute cholecystitis, seen by general surgery, plan is to do cholecystostomy tube,  Pulmonary on board for bilateral lung abscesses and infectious disease also on board,  Continue IV antibiotics,  HIV and TB testing pending    Brief History:     53-year-old gentleman with underlying history of diabetes, morbid obesity, cough with pneumonia in July, recovered, started having subsequent cough and has been seen by   3 weeks back and sent home, but then came back with the right upper quadrant abdominal pain and shortness of breath was admitted to [facility] for lung abscess and acute cholecystitis. Transferred here today for further care. General surgery, infectious disease, pulmonary consulted,  Patient sitting in the bed when I saw, still coughing, still having right upper quadrant abdominal pain,  Pulmonary, general surgery, infectious disease, CT surgery on board,  TB testing and HIV pending    Review of Systems:     Constitutional:  negative for chills, fevers, sweats  Respiratory: Shortness of breath and cough  Cardiovascular:  negative for chest pain, chest pressure/discomfort, lower extremity edema, palpitations  Gastrointestinal:  negative for abdominal pain, constipation, diarrhea, nausea, vomiting  Neurological:  negative for dizziness, headache    Medications: Allergies: Allergies   Allergen Reactions    Sulfa Antibiotics Hives and Rash       Current Meds:   Scheduled Meds:    guaiFENesin  600 mg Oral BID    [Held by provider] metFORMIN  500 mg Oral BID WC    sodium chloride flush  10 mL Intravenous 2 times per day    [Held by provider] enoxaparin  40 mg Subcutaneous Daily    levofloxacin  750 mg Intravenous Q24H    vancomycin (VANCOCIN) intermittent dosing (placeholder)   Other RX Placeholder    vancomycin  2,000 mg Intravenous Q12H    famotidine (PEPCID) injection  20 mg Intravenous 2 times per day    insulin lispro  0-6 Units Subcutaneous TID     insulin lispro  0-3 Units Subcutaneous Nightly     Continuous Infusions:    sodium chloride 75 mL/hr at 09/23/20 0817    dextrose       PRN Meds: albuterol sulfate HFA, sodium chloride flush, acetaminophen **OR** acetaminophen, magnesium hydroxide, nicotine, promethazine **OR** ondansetron, morphine, glucose, dextrose, glucagon (rDNA), dextrose    Data:     Past Medical History:   has a past medical history of Diabetes mellitus ([facility] [address] Social History:   reports that he has never smoked. He has never used smokeless tobacco.     Family History: No family history on file.     Vitals:  /75   Pulse 86   Temp 98.2 °F (36.8 °C) (Oral)   Resp 16   Ht 5' 9\" (1.753 m)   Wt 220 lb (99.8 kg)   SpO2 96%   BMI 32.49 kg/m²   Temp (24hrs), Av °F (37.2 °C), Min:97.7 °F (36.5 °C), Max:101.1 °F (38.4 °C)    Recent Labs     20  1125   POCGLU 224* 184* 244*       I/O (24Hr): Intake/Output Summary (Last 24 hours) at 2020 1316  Last data filed at 2020 0536  Gross per 24 hour   Intake 1264.4 ml   Output --   Net 1264.4 ml       Labs:  Hematology:  Recent Labs     20   WBC 8.2   RBC 3.77*   HGB 11.7*   HCT 36.2*   MCV 96.0   MCH 31.0   MCHC 32.3   RDW 13.3      MPV 9.7   INR 1.3     Chemistry:  Recent Labs     20   NA  --  135   K  --  3.8   CL  --  102   CO2  --  24   GLUCOSE  --  205*   BUN  --  6   CREATININE 0.47* 0.49*   ANIONGAP  --  9   LABGLOM >60 >60   GFRAA >60 >60   CALCIUM  --  8.3*     Recent Labs     20  112   LABA1C  --  13.3*  --   --    POCGLU 224*  --  184* 244*     ABG:No results found for: POCPH, PHART, PH, POCPCO2, XKB7JYU, PCO2, POCPO2, PO2ART, PO2, POCHCO3, BRK9BWA, HCO3, NBEA, PBEA, BEART, BE, THGBART, THB, OCC9TED, ORHQ4BTI, L6UOEXMI, O2SAT, FIO2  Lab Results   Component Value Date/Time    SPECIAL RT [facility] 2020 06:48 PM     Lab Results   Component Value Date/Time    CULTURE NO GROWTH 7 HOURS 2020 06:48 PM       Radiology:  Xr Chest (single View Frontal)    Result Date: 2020  Focal airspace consolidation right lung base. No evidence of cavitation on this single frontal view. Ct Chest Wo Contrast    Result Date: 2020  1. Posterior right lower lobe 3.5 x 2.8 x 4 cm fluid and gas filled collection. The morphology favors an intraparenchymal lung abscess rather than empyema. 2. Areas of ground-glass opacity and reticulation compatible with history of COVID-19 pneumonia. 3. Enlarged subcarinal lymph node could be reactive.  4. Trace pericholecystic fluid is partially imaged. Findings correspond with the recent HIDA scan. Xr Chest Portable    Result Date: 9/23/2020  Persistent moderate patchy right basilar infiltrate, pneumonia the likely etiology, follow changes to resolution/sign report       Physical Examination:        General appearance:  alert, cooperative and no distress  Mental Status:  oriented to person, place and time and normal affect  Lungs:  clear to auscultation bilaterally, normal effort  Heart:  regular rate and rhythm, no murmur  Abdomen:  soft, nontender, nondistended, normal bowel sounds, no masses, hepatomegaly, splenomegaly  Extremities:  no edema, redness, tenderness in the calves  Skin:  no gross lesions, rashes, induration    Assessment:        Hospital Problems           Last Modified POA    * (Principal) Abscess of lower lobe of right lung with pneumonia ([facility] [address] 9/22/2020 Yes    Pneumonia 9/22/2020 Yes    Acute cholecystitis 9/22/2020 Yes    Multifocal pneumonia 9/22/2020 Yes    Obesity due to excess calories 9/22/2020 Yes    Diabetes mellitus type 2 in obese ([address] 9/22/2020 Yes    At high risk for tuberculosis infection 9/23/2020 Yes          Plan:        1. Post COVID lung abscess of the right lower lobe, was admitted to [facility], seen by pulmonary and infectious disease, initially he was on vancomycin, cefepime and Flagyl, subsequently changed to Vanco and Levaquin, will continue that at this time, infectious disease on board, also pulmonary on board, breathing treatments, CT surgery also on board  2. Acute cholecystitis, patient was seen by general surgery, general surgeon saw the patient here at [facility], plan to do cholecystostomy tube and continue antibiotics  3. Multifocal pneumonia, broad-spectrum antibiotic, blood cultures, sputum cultures, pending,  4. Patient high risk for tuberculosis and HIV, testing pending, in isolation  5. Pain control,  6.  Diabetes mellitus type 2, continue to monitor

## 2020-09-23 NOTE — PROGRESS NOTES
Dr. Skyler Bedolla paged to be informed of surgery consult, who informed this writer that Dr. Angel Brooks is on-call for [facility].  Dr. Angel Brooks paged & informed of surgery consult & updated to pt's condition, who suggested cardiothoracic surgery to be consulted. Dr. Holly Sherman paged. Awaiting call back to inform of consult.

## 2020-09-23 NOTE — PROGRESS NOTES
Physical Therapy   DATE: 2020    NAME: Maddi Conner  MRN: [id number]   : 1970    Patient not seen this date for Physical Therapy due to:  [] Blood transfusion in progress  [] Cancel by RN  [] Hemodialysis  []  Refusal by Patient   [] Spine Precautions   [] Strict Bedrest  [] Surgery  [] Testing      [x] Other: await TB test results        [] PT being discontinued at this time. Patient independent. No further needs. [] PT being discontinued at this time as the patient has been transferred to hospice care. No further needs.     Sonia Banks, PT

## 2020-09-23 NOTE — BRIEF OP NOTE
Brief Postoperative Note    Abe Espinoza  YOB: 1970  [id number]    Pre-operative Diagnosis: Suspected acute cholecystitis    Post-operative Diagnosis: Same    Procedure: Percutaneous 8 fr transhepatic cholecystostomy tube placement    Anesthesia: Local    Surgeons/Assistants: Sreekanth    Estimated Blood Loss: less than 50     Complications: None    Specimens: Was Obtained: thick bile    Electronically signed by Anushka Avelar MD on 9/23/2020 at 4:38 PM

## 2020-09-23 NOTE — PLAN OF CARE
Problem: Pain:  Goal: Pain level will decrease  Description: Pain level will decrease  Outcome: Ongoing  Note: Monitoring pain with each assessment and prn. MABLE 0-10 pain scale utilized. Non-pharmacological measures to be encouraged prior to pharmacological measures. Goal: Control of acute pain  Description: Control of acute pain  Outcome: Ongoing  Goal: Control of chronic pain  Description: Control of chronic pain  Outcome: Ongoing     Problem: Nutritional:  Goal: Nutritional status will improve  Description: Nutritional status will improve  Outcome: Ongoing  Note: Review diet daily and as needed with pt. Provide supplement nutrition as ordered by physician & educate pt. Review nutritional guidelines with pt. Problem: Physical Regulation:  Goal: Diagnostic test results will improve  Description: Diagnostic test results will improve  Outcome: Ongoing  Note: Assessed temperature for fever or for hypothermia signs or symptoms. Implemented protocol for either fever or hypothermia. Goal: Will remain free from infection  Description: Will remain free from infection  Outcome: Ongoing  Note: Monitor for signs & symptoms of infection. Utilize standard precautions & proper handwashing. Communicate referral to infection control. Goal: Ability to maintain vital signs within normal range will improve  Description: Ability to maintain vital signs within normal range will improve  Outcome: Ongoing  Note: Monitor vital signs at least every shift & as needed. Monitor intake & output at least every shift. Problem: Respiratory:  Goal: Ability to maintain normal respiratory secretions will improve  Description: Ability to maintain normal respiratory secretions will improve  Outcome: Ongoing  Note: Assess for adventitious breath sounds. Monitored SaO2 > 90%. Applied 02 per nasal cannula as needed. Elevated HOB to improve breathing as needed.         Problem: Skin Integrity:  Goal: Demonstration of wound healing without infection will improve  Description: Demonstration of wound healing without infection will improve  Outcome: Ongoing  Note: Assess wound for signs of healing, size, appearance, & drainage. Assess pulses & for pain. Goal: Complications related to intravenous access or infusion will be avoided or minimized  Description: Complications related to intravenous access or infusion will be avoided or minimized  Outcome: Ongoing  Note: Continuing to monitor for skin integrity risks. Patient independent with turning/repositioning. Turning/repositioning encouraged at least once every 2 hrs, and prn basis. Hygiene care being completed independently per patient; assistance provided when deemed necessary.

## 2020-09-23 NOTE — CARE COORDINATION
Case Management Initial Discharge Plan  Davina Quezadaabebe,         Readmission Risk              Risk of Unplanned Readmission:        6             Met with:patient to discuss discharge plans. Information verified: address, contacts, phone number, , insurance Yes  PCP: Lala Gage MD  Date of last visit: 1 year    Insurance Provider: [facility]     Discharge Planning  Current Residence:  Private home   Living Arrangements:  Family Members       Home has 1 stories/2 stairs to climb to enter the home      Support Systems:  Family Members     Current Services PTA:  None   Agency: none         Patient able to perform ADL's:Independent  DME in home:  None   DME used to aid ambulation prior to admission:   None   DME used during admission:  None     Potential Assistance Needed:  N/A    Pharmacy: [facility] on [address]    Potential Assistance Purchasing Medications:  No  Does patient want to participate in local refill/ meds to beds program?  Yes    Patient agreeable to home care: No  [place] of choice provided:  n/a      Type of Home Care Services:  None  Patient expects to be discharged to:  home    Prior SNF/Rehab Placement and Facility: none  Agreeable to SNF/Rehab: No  [place] of choice provided: n/a   Evaluation: n/a    Expected Discharge date:  20  Follow Up Appointment: Best Day/ Time: Tuesday PM    Transportation provider: per family  Transportation arrangements needed for discharge: No    Discharge Plan:   Patient lives with cousin Clint Matos ( [phone]) . Patient  does not drive but his cousin does. Patient transferred from [facility] for pneumonia with possible lung abscess along with acute cholecystitis . Patient stated he was covid + in July and was at [facility] for 8 days. ID and CTSX are consulted. Possible VATS . Did discuss potential for IV atb and possible home care. He has [employer] and [employer] can accept if needed.      Will follow for their recommendations.      Electronically signed by Brittanie Biggs RN on 9/23/20 at 3:07 PM EDT

## 2020-09-23 NOTE — CONSULTS
[facility] Cardiothoracic Surgery  Consult    Patient's Name/Date of Birth: Babak Cabrales / 1970 (89 y.o.)    Date: September 23, 2020     Chief Complaint: No chief complaint on file. HPI: Babak Cabrales is a 45 y.o.  male who [facility] with no chief complaint but lower lobe right lung abscess. Back in July patient was worked up and treated for pneumonia fully recovered . Patient also tested positive for COVID in July. Was never intubated. About 3 weeks ago patient started developing a cough again was treated by . sent home but then presented back to [facility] for lung abscess and acute cholecystitis and no intervention needed at that time. patient was told that he has gallstones. No cholecystectomy. patient brought to [facility] for further work-up and care due to insurance not covered by [facility]. Pulmonary consulted and following  1630-cholecystostomy tube placement by IR         patient has no white count. Afebrile. Positive MRSA,  negative blood cultures    Patient has never smoked drink or use drugs    ROS:   CONSTITUTIONAL:   Respiratory: Continuous dry cough  Cardiovascular: negative  Gastrointestinal: Right quad pain after some meals  Genitourinary:negative  Hematologic/lymphatic: negative  Musculoskeletal:negative  Neurological: negative  Endocrine: negative    Past Medical History:   Diagnosis Date    Diabetes mellitus ([address]      No past surgical history on file. Allergies   Allergen Reactions    Sulfa Antibiotics Hives and Rash     No family history on file.   Social History     Socioeconomic History    Marital status: Single     Spouse name: Not on file    Number of children: Not on file    Years of education: Not on file    Highest education level: Not on file   Occupational History    Not on file   Social Needs    Financial resource strain: Not on file    Food insecurity     Worry: Not on file     Inability: Not on file   [facility] Transportation needs     Medical: Not on file     Non-medical: Not on file   Tobacco Use    Smoking status: Never Smoker    Smokeless tobacco: Never Used   Substance and Sexual Activity    Alcohol use: Not on file    Drug use: Not on file    Sexual activity: Not on file   Lifestyle    Physical activity     Days per week: Not on file     Minutes per session: Not on file    Stress: Not on file   Relationships    Social connections     Talks on phone: Not on file     Gets together: Not on file     Attends [employer] service: Not on file     Active member of club or organization: Not on file     Attends meetings of clubs or organizations: Not on file     Relationship status: Not on file    Intimate partner violence     Fear of current or ex partner: Not on file     Emotionally abused: Not on file     Physically abused: Not on file     Forced sexual activity: Not on file   Other Topics Concern    Not on file   Social History Narrative    Not on file       Current Facility-Administered Medications   Medication Dose Route Frequency Provider Last Rate Last Dose    albuterol sulfate  (90 Base) MCG/ACT inhaler 2 puff  2 puff Inhalation Q6H PRN Josy Franklin APRN - NP        guaiFENesin [facility]) extended release tablet 600 mg  600 mg Oral BID Josy Franklin APRN - NP        [Held by provider] metFORMIN (GLUCOPHAGE) tablet 500 mg  500 mg Oral BID WC Josy Franklin APRN - NP        0.9 % sodium chloride infusion   Intravenous Continuous FLORENCE Smith - NP 75 mL/hr at 09/23/20 0817      sodium chloride flush 0.9 % injection 10 mL  10 mL Intravenous 2 times per day Josy Franklin APRN - NP   10 mL at 09/23/20 0818    sodium chloride flush 0.9 % injection 10 mL  10 mL Intravenous PRN Josy Franklin APRN - NP        acetaminophen (TYLENOL) tablet 650 mg  650 mg Oral Q6H PRN Clenton Rigoberto APRN - NP   650 mg at 09/22/20 2132    Or    acetaminophen (TYLENOL) suppository 650 mg  650 mg Rectal Q6H PRN Caro Nelson APRN - NP        magnesium hydroxide (MILK OF MAGNESIA) 400 MG/5ML suspension 30 mL  30 mL Oral Daily PRN Caro Nelson APRN - NP        nicotine (NICODERM CQ) 21 MG/24HR 1 patch  1 patch Transdermal Daily PRN Caro Nelson APRN - NP        [Held by provider] enoxaparin (LOVENOX) injection 40 mg  40 mg Subcutaneous Daily Caro Nelson APRN - NP   40 mg at 09/22/20 2240    promethazine (PHENERGAN) tablet 12.5 mg  12.5 mg Oral Q6H PRN Caro Nelson APRN - NP        Or    ondansetron [facility]) injection 4 mg  4 mg Intravenous Q6H PRN Caro Nelson APRN - NP        levoFLOXacin (LEVAQUIN) 750 MG/150ML infusion 750 mg  750 mg Intravenous Q24H Caro Nelson APRN - NP   Stopped at 09/23/20 5089    vancomycin (VANCOCIN) intermittent dosing (placeholder)   Other RX Placeholder Esau Thornton MD        vancomycin (VANCOCIN) 2,000 mg in dextrose 5 % 500 mL IVPB  2,000 mg Intravenous Q12H Esau Thornton MD   Stopped at 09/23/20 1038    morphine (PF) injection 2 mg  2 mg Intravenous Q1H PRN Roman Hernandez, DO   2 mg at 09/23/20 1214    famotidine (PEPCID) injection 20 mg  20 mg Intravenous 2 times per day Alfred Rader, DO   20 mg at 09/23/20 0818    glucose (GLUTOSE) 40 % oral gel 15 g  15 g Oral PRN Smita Mixon APRN - CNP        dextrose 50 % IV solution  12.5 g Intravenous PRN Savannahine Oral APRN - CNP        glucagon (rDNA) injection 1 mg  1 mg Intramuscular PRN Selestine Oral APRN - CNP        dextrose 5 % solution  100 mL/hr Intravenous PRN Savannahine Oral APRN - CNP        insulin lispro (HUMALOG) injection vial 0-6 Units  0-6 Units Subcutaneous TID WC Selestine Oral, APRN - CNP   2 Units at 09/23/20 1213    insulin lispro (HUMALOG) injection vial 0-3 Units  0-3 Units Subcutaneous Nightly Barbaraestine Oral, APRN - CNP   1 Units at 09/22/20 2240       Physical Exam:  Vitals:    09/23/20 1127   BP: 123/75   Pulse: 86 Resp: 16   Temp: 98.2 °F (36.8 °C)   SpO2: 96%     Weight: Weight: 220 lb (99.8 kg)    Weight: 220 lb (99.8 kg)        General: Alert and Oriented x3. Sitting up in bed. No apparent distress. HEENT:  Normocephalic and atraumatic. PERRL. EOMI. Lips and oral mucosa moist and without lesions. Neck:  Supple. Trachea midline. Chest:  No abnormality. Equal and symmetric expansion with respiration. Lungs:  Clear to auscultation. Noted dry cough during assessment  Cardiac:  Regular rate and rhythm without murmurs, rubs or gallops. Abdomen:  Soft, non-tender, normoactive bowel sounds. No masses or organomegaly. Extremities:  No cyanosis, clubbing, or edema. Intact pulses in all four extremities. Musculoskeletal:  Intact range of motion of peripheral joints. Normal muscular strength. Neurologic:  Cranial nerves are grossly intact. Non-focal sensory deficits on exam.  Psychiatric: Mood and affect are appropriate. Imaging Studies:        CT: CT this morning-26 x 35 mm abscess in the right posterior. Air noted. Possible abscess. Assessment & Plan:  Patient Active Problem List   Diagnosis    Pneumonia    Abscess of lower lobe of right lung with pneumonia ([address]    Acute cholecystitis    Multifocal pneumonia    Obesity due to excess calories    Diabetes mellitus type 2 in obese ([address]    At high risk for tuberculosis infection     PLAN:  I will present findings to surgeon and we will determine further plan  Please complete postop work-up tonight.   COVID negative (X2 tests)    Agree with above note  Plan to repeat CT of chest in the AM  Will consider VAT's tomorrow if CT worse  Will discuss with patient and other [address], CNP  Phone: [phone]

## 2020-09-23 NOTE — PROGRESS NOTES
Dr. Joseph Bowen updated on patient. He states to discontinue Airborne/droplet precautions, he states patient does not have TB.

## 2020-09-23 NOTE — PROGRESS NOTES
Call received from Dr. David Le, general surgery who was rounding for Dr. Angel Brooks. Updated to pt's status, and that pt is in airborne precautions to rule out TB and that phlebotomist inquired if labs for TB rule out can be drawn in the morning. Informed by Dr. Ponce Congress it would be beneficial for lab to be drawn as soon as possible to obtain results, so as not to delay potential treatment and/or surgery. Informed lab to draw pt now for TB rule out. Pt transferred via bed & with all valuables from 1017 to 1021 and placed in airborne precautions.

## 2020-09-23 NOTE — CONSULTS
General Surgery:  Consult Note        PATIENT NAME: Holger Leach   YOB: 1970    ADMISSION DATE: 9/22/2020  5:46 PM     Admitting Provider: Dr. Manjit Hubbard Physician: Dr. Jennifer Monahan: 9/23/2020    Chief Complaint:  Abdominal pain/chest pain  Consult Regarding:  Acute cholecystitis    HISTORY OF PRESENT ILLNESS:  The patient is a 45 y.o. male with recent history of COVID in July, transfer from [facility] with concerns for right lower lobe pneumonia abscess, and acute cholecystitis. Patient went to [facility] several days ago with complaints of right upper quadrant pain and chest pain when breathing. CT scan was performed finding a right lower lobe abscess and inflammation around the gallbladder. Patient underwent an ultrasound that confirmed gallbladder wall thickening and gallstones. General surgery [facility] evaluated patient and recommended HIDA scan given his recent COVID and right lower lobe pneumonia. HIDA scan was positive for acute cholecystitis. Patient was transferred prior to intervention. General surgery was consulted. On evaluation, patient is tender to palpation in the right upper quadrant, but worse with breathing. He states he has been tolerating a diet without nausea, vomiting, or worsening of his abdominal pain. Patient was febrile on presentation to 38.4, but otherwise vitals within normal limits. Patient had normal white blood count and normal LFTs. Past Medical History:        Diagnosis Date    Diabetes mellitus ([address]        Past Surgical History:    No past surgical history on file.     Medications Prior to Admission:   Medications Prior to Admission: metFORMIN (GLUCOPHAGE) 500 MG tablet, Take 500 mg by mouth 2 times daily (with meals)  ibuprofen (IBU) 800 MG tablet, Take 1 tablet by mouth every 6 hours as needed for Pain    Allergies:  Sulfa antibiotics    Social History:   Social History     Socioeconomic History    Marital status: Single     Spouse name: Not on file    Number of children: Not on file    Years of education: Not on file    Highest education level: Not on file   Occupational History    Not on file   Social Needs    Financial resource strain: Not on file    Food insecurity     Worry: Not on file     Inability: Not on file    Transportation needs     Medical: Not on file     Non-medical: Not on file   Tobacco Use    Smoking status: Never Smoker    Smokeless tobacco: Never Used   Substance and Sexual Activity    Alcohol use: Not on file    Drug use: Not on file    Sexual activity: Not on file   Lifestyle    Physical activity     Days per week: Not on file     Minutes per session: Not on file    Stress: Not on file   Relationships    Social connections     Talks on phone: Not on file     Gets together: Not on file     Attends [employer] service: Not on file     Active member of club or organization: Not on file     Attends meetings of clubs or organizations: Not on file     Relationship status: Not on file    Intimate partner violence     Fear of current or ex partner: Not on file     Emotionally abused: Not on file     Physically abused: Not on file     Forced sexual activity: Not on file   Other Topics Concern    Not on file   Social History Narrative    Not on file       Family History:   No family history on file. REVIEW OF SYSTEMS:    CONSTITUTIONAL: Febrile. HEENT: Denies rhinorrhea, dysphagia, odynphagia. CARDIOVASCULAR: Denies history of MI, recent chest pain. RESPIRATORY: Pain on deep inspiration  GASTROINTESTINAL: Admits to right upper quadrant pain. Denies nausea, vomiting  GENITOURINARY: Denies increased frequency or dysuria. HEMATOLOGIC/LYMPHATIC: Denies history of anemia or DVTs. ENDOCRINE: Denies history of thyroid problems. History of diabetes  NEURO: Denies history of CVA, TIA. Review of systems negative unless listed above.     PHYSICAL EXAM:    VITALS:  /70 Pulse 85   Temp 97.7 °F (36.5 °C) (Oral)   Resp 18   Ht 5' 9\" (1.753 m)   Wt 220 lb (99.8 kg)   SpO2 98%   BMI 32.49 kg/m²   INTAKE/OUTPUT:     Intake/Output Summary (Last 24 hours) at 9/23/2020 0605  Last data filed at 9/23/2020 0536  Gross per 24 hour   Intake 1264.4 ml   Output --   Net 1264.4 ml       CONSTITUTIONAL: Alert, awake, no acute distress  HEENT: Normocephalic/atraumatic, without obvious abnormality. NECK:  Supple, symmetrical, trachea midline   CARDIOVASCULAR: Regular rate and rhythm  LUNGS: No respiratory distress  ABDOMEN: Soft, nondistended, tender to palpation in the right upper quadrant. No peritoneal signs, no guarding, no rebound tenderness. MUSCULOSKELETAL: Muscle strength intact in all extremities bilaterally. NEUROLOGIC: CN II- XII intact. Gross motor intact without focal weakness. SKIN: No cyanosis, rashes, or edema noted. Orientation:   oriented to person, place, and time      CBC with Differential:    Lab Results   Component Value Date    WBC 8.2 09/23/2020    RBC 3.77 09/23/2020    HGB 11.7 09/23/2020    HCT 36.2 09/23/2020     09/23/2020    MCV 96.0 09/23/2020    MCH 31.0 09/23/2020    MCHC 32.3 09/23/2020    RDW 13.3 09/23/2020     BMP:    Lab Results   Component Value Date    CREATININE 0.47 09/22/2020    GFRAA >60 09/22/2020    LABGLOM >60 09/22/2020       Pertinent Radiology:   Xr Chest (single View Frontal)    Result Date: 9/23/2020  EXAMINATION: ONE XRAY VIEW OF THE CHEST 9/22/2020 10:24 pm COMPARISON: 03/15/2020 HISTORY: ORDERING SYSTEM PROVIDED HISTORY: r/o TB TECHNOLOGIST PROVIDED HISTORY: r/o TB Reason for Exam: r/o tb Acuity: Acute Type of Exam: Initial FINDINGS: There is focal airspace consolidation in right medial lung base. Left lung is clear. Cardiomediastinal silhouette is stable. Pulmonary vasculature within normal limits. No sizable pleural effusion. No pneumothorax. Focal airspace consolidation right lung base.   No evidence of cavitation on this single frontal view. ASSESSMENT:  Active Hospital Problems    Diagnosis Date Noted    Pneumonia [J18.9] 09/22/2020    Abscess of lower lobe of right lung with pneumonia ([facility] [address] [J85.1] 09/22/2020    Acute cholecystitis [K81.0] 09/22/2020    Multifocal pneumonia [J18.9] 09/22/2020    Obesity due to excess calories [E66.09] 09/22/2020    Diabetes mellitus type 2 in obese ([facility] [address] [E11.69, E66.9] 09/22/2020       Plan:  1. Continue medical mgmt and supportive care per primary  2. Diet: N.p.o.  3. Given the patient has a right lower lobe pneumonia with an abscess, and awaiting thoracic recommendations, recommend cholecystostomy tube placement for temporary management of acute cholecystitis. 4. Recommend continuing antibiotics for coverage of acute cholecystitis  5. Thoracic surgery consulted, appreciate recommendations  6. Awaiting TB results. 7. Medical management per primary team will follow along.       Electronically signed by Carina Tapia DO  on 9/23/2020 at 6:05 AM

## 2020-09-23 NOTE — PROGRESS NOTES
Dr. Arlin Wiseman rounded and updated on patient. Okay to give clear liquid diet now and NPO at midnight.

## 2020-09-23 NOTE — CONSULTS
Pharmacy Note  Vancomycin Consult - Initial Note     Serafin Prajapati is a 45 y.o. male ordered Vancomycin. .  Current diagnosis for which MRSA is suspected/confirmed: Pneumonia/Abscess of lung- right lower lobe  Vancomycin order/consult received from Dr. Merlene Roblero. Additional antimicrobials:  levofloxacin    Patient Active Problem List   Diagnosis    Pneumonia    Abscess of lower lobe of right lung with pneumonia ([facility] [address]    Acute cholecystitis    Multifocal pneumonia    Obesity due to excess calories    Diabetes mellitus type 2 in obese ([facility] [address]       Height:   5' 9\"  Wt Readings from Last 1 Encounters:   09/22/20 220 lb (99.8 kg)     Allergies:  Sulfa antibiotics       No results found for: PROCAL  No results for input(s): BUN in the last 72 hours. Recent Labs     09/22/20  1848   CREATININE 0.47*     No results for input(s): WBC in the last 72 hours. No intake or output data in the 24 hours ending 09/22/20 1930    Temp: 100.2 F    Culture Date / Source  /  Results  9/22/20             Blood        Pending    Actual Weight:    Wt Readings from Last 1 Encounters:   09/22/20 220 lb (99.8 kg)     CrCl based on IBW\" 145.7 mL/min    PLAN   Initial loading dose of 25mg/kg (max of 2500 mg) = No bolus. Patient has been on vanco 2000 mg q12h at [facility]. 2.    Will continue [facility] dosing of vancomycin 2000 mg IV every 12 hours and draw trough prior to 3rd dose. 3.   Ensured BUN/sCr ordered at baseline and at least every 3rd day. 4.   ONLY for suspected pneumonia or COPD: MRSA nasal swab   Ordered . 5. Trough ordered for:  9/23/20 20:00. Trough Goals (Non-dialysis patient) Peaks are not routinely recommended. 10-20 mcg/mL for mild skin/soft tissue infections or UTI.  15-20 mcg/mL is the target trough for all other indications that MRSA infection is suspected. TROUGH TIMING: (Additional levels drawn based on renal function and/or clinical response).   Dosing interval Timing of Trough   Every 8 hr, 12 hr, or 18 hr regimen Prior to the 4th dose  Twice weekly troughs for every 8 hour dosing   Every 24 hr regimen Prior to the 3rd or 4th dose   Every 36 hr regimen Prior to the 3rd dose           Thank you for the consult. Pharmacy will continue to follow.   Raenette Hodgkin, RPh  9/22/2020  7:30 PM

## 2020-09-23 NOTE — PLAN OF CARE
Problem: Pain:  Goal: Pain level will decrease  Description: Pain level will decrease  9/23/2020 0826 by Hua Donnelly RN  Outcome: Ongoing  Patient c/o pain abdomen RUQ. Medicated as ordered. Gen kenny on board. Plans for Cholecystotomy tube today. Will monitor.

## 2020-09-23 NOTE — CONSULTS
[facility]  Initial Consult Note  Date: 9/23/2020    Hospital day :1     Impression:   1. Recent COVID-19 infection and I was able to review the CT imaging from July and compared it to the current imaging and there is significant improvement in the groundglass opacities  2. Right lower lobe pneumonia versus early abscess  3. Cholecystitis  4. Diabetes mellitus type 2    Recommendations   · These findings are not at all consistent with pulmonary tuberculosis  · The patient did previously have COVID-19 infection and the infiltrates noted from the prior disease and again these are markedly improved from his CT scan in July. · The patient does have a dense right lower lobe infiltrate versus abscess  · The patient will have a percutaneous cholecystostomy tube placed at interventional radiology for the cholecystitis  · I will change antibiotic therapy to Zosyn and continue vancomycin for now  · The patient does not need droplet or airborne isolation  · Continue contact isolation for the history of MRSA  · The plan would be to continue antimicrobial therapy for 2 to 3 weeks and repeat CT imaging of the chest in 4 to 6 weeks. Chief complaint/reason for consultation:   Right lower lobe pneumonia/abscess    History of Present Illness:   Nasima Woods is a 45y.o.-year-old male who was initially admitted on 9/22/2020. Santos Valles has a history of diabetes mellitus and was hospitalized in July 2020 with COVID-19 virus pneumonia treated with Redesivir and he recovered and was discharged after about a 7 to 8-day hospital stay. He reports a chronic cough since his discharge but otherwise he was doing well/better.   The patient reports that on Sunday he developed epigastric pain that he initially described as a \"hunger pain\" but it persisted for over an hour and he felt like something was wrong so he ended up coming into the emergency department at [place] [address] where he had CT imaging that showed Temperature Range: Temp: 98.2 °F (36.8 °C) Temp  Av °F (37.2 °C)  Min: 97.7 °F (36.5 °C)  Max: 101.1 °F (38.4 °C)  General Appearance: Awake, alert, and in no apparent distress  Head: Normocephalic, without obvious abnormality, atraumatic  Eyes: Pupils equal, round, reactive, to light and accommodation; extraocular movements intact; sclera anicteric; conjunctivae pink  ENT: Oropharynx clear, without erythema, exudate, or thrush. Neck: Supple, without lymphadenopathy. Pulmonary/Chest: Clear to auscultation, without wheezes, rales, or rhonchi  Cardiovascular: Regular rate and rhythm without murmurs, rubs, or gallops. Abdomen: Soft, nontender, nondistended. Extremities: No cyanosis, clubbing, edema, or effusions. Neurologic: No gross sensory or motor deficits. Skin: Warm and dry with no rash. Medical Decision Making:   I have independently reviewed/ordered the following labs:  CBC with Differential:   Recent Labs     20  0522   WBC 8.2   HGB 11.7*   HCT 36.2*        BMP:   Recent Labs     20  1848 20  0522   NA  --  135   K  --  3.8   CL  --  102   CO2  --  24   BUN  --  6   CREATININE 0.47* 0.49*     Hepatic Function Panel: No results for input(s): PROT, LABALBU, BILIDIR, IBILI, BILITOT, ALKPHOS, ALT, AST in the last 72 hours. No results found for: PROCAL    No results found for: CRP  No results found for: FERRITIN  No results found for: FIBRINOGEN  No results found for: DDIMER  No results found for: LDH    No results found for: SEDRATE    Lab Results   Component Value Date    COVID19 Not Detected 2020     No results found for requested labs within last 30 days. Imaging Studies:   ONE XRAY VIEW OF THE CHEST 2020 10:24 pm   FINDINGS:   Focal airspace consolidation right lung base. No evidence of cavitation on this single frontal view. CT OF THE CHEST WITHOUT CONTRAST 2020 12:13 pm  FINDINGS:     1.  Posterior right lower lobe 3.5 x 2.8 x 4 cm fluid and gas filled collection. The morphology favors an intraparenchymal lung abscess rather than empyema. 2. Areas of ground-glass opacity and reticulation compatible with history of COVID-19 pneumonia. 3. Enlarged subcarinal lymph node could be reactive. 4. Trace pericholecystic fluid is partially imaged. Findings correspond with the recent HIDA scan. Cultures:     Culture, Blood 1 [[id number]]   Collected: 09/22/20 1848    Order Status: Completed  Specimen: Blood  Updated: 09/23/20 0510     Specimen Description  . BLOOD     Special Requests  RT ARM,6CC     Culture  NO GROWTH 7 HOURS    MRSA DNA Probe, Nasal [[id number]]   Collected: 09/22/20 2150    Order Status: Sent  Specimen: Nares  Updated: 09/22/20 2212    Culture, Blood 2 [[id number]]      Order Status: No result  Specimen: Blood     Sputum gram stain [[id number]]      Order Status: No result  Specimen: Sputum Expectorated     Respiratory Culture [[id number]]      Order Status: No result  Specimen: Sputum Expectorated             Thank you for allowing us to participate in the care of this patient. Please call with questions. Electronically signed by Patricia Leigh MD on 9/23/2020 at 1:37 PM      [facility]  Patricia Leigh MD  Perfect Serve messaging  OFFICE: [phone]      This note is created with the assistance of a speech recognition program.  While intending to generate a document that actually reflects the content of the visit, the document can still have some errors including those of syntax and sound a like substitutions which may escape proof reading. In such instances, actual meaning can be extrapolated by contextual diversion.

## 2020-09-23 NOTE — PROGRESS NOTES
Transitions of Care Pharmacy Service   Medication Review    The patient's list of current home medications has been reviewed. Source(s) of information: patient, surescripts, [facility]    Based on information provided by the above source(s), I have updated the patient's home med list as described below. I changed or updated the following medications on the patient's home medication list:  Discontinued None      Added Lantus 100unit/ml - 12units QD  Humalog 100unit/ml - 2-10 units PRN sliding scale  Albuterol Inh - 2Q6H PRN wheezing     Adjusted   Ibuprofen 800mg - changed from 1Q6H PRN to 1Q8H PRN     Other Notes Albuterol Inh - Filled 07/26/20 - still has at home, uses PRN  Ibuprofen 800mg - Patient usually takes BID   Lantus 100unit/ml - Patient picked up prescription but hasn't started yet, only had for 1 day prior to hospital admission (09/22/20)  Humalog 100unit/ml - Prescription waiting at pharmacy, required a PA so patient was unable to  on the same day as Lantus           Please feel free to call me with any questions about this encounter. Thank you. This note will be reviewed and co-signed by the Transitions of Care Pharmacist. The pharmacist will review inpatient orders and contact the physician about any discrepancies. Roldan Anglin, pharmacy technician  Transitions of Care Pharmacy Service  Phone:  [phone]  Fax: [phone]      Electronically signed by Roldan Anglin on 9/23/2020 at 11:23 AM     Prior to Admission medications    Medication Sig Start Date End Date Taking?  Authorizing Provider   insulin glargine (LANTUS) 100 UNIT/ML injection vial Inject 12 Units into the skin daily       insulin lispro (HUMALOG) 100 UNIT/ML injection vial Inject 2-10 Units into the skin as needed (per sliding scale)       albuterol sulfate HFA (VENTOLIN HFA) 108 (90 Base) MCG/ACT inhaler Inhale 2 puffs into the lungs every 6 hours as needed for Wheezing       ibuprofen (ADVIL;MOTRIN) 800 MG tablet Take 800 mg by mouth every 8 hours as needed for Pain       metFORMIN (GLUCOPHAGE) 500 MG tablet Take 500 mg by mouth 2 times daily (with meals)

## 2020-09-23 NOTE — FLOWSHEET NOTE
Per sign on door, patient is under airborne precautions. Writer prays for patient in hallway. Spiritual Care will follow as needed.      09/23/20 4054   Encounter Summary   Services provided to: Patient not available   Continue Visiting   (9/23/20 airborne precautions)   Complexity of Encounter Low   Length of Encounter 15 minutes   Routine   Type Initial   Assessment Unable to respond

## 2020-09-23 NOTE — CONSULTS
Pulmonary Medicine and Critical Care Consult  [facility] APRN-CNP/Ambika Thomas MD      Patient - Abe Espinoza   MRN -  [id number]   Pascual # - [de-identified]   - 1970      Date of Admission -  2020  5:46 PM  Date of evaluation -  2020  Room - [address] [place] [address]-   Jessica Oropeza MD Primary Care Physician - Rubi Mishra MD     Reason for Consult    Right lower lobe lung abscess    Assessment   · Right lower lobe lung abscess, 3.5 x 2.8 x 4 cm fluid and gas filled   collection, doubt TB  · Bilateral groundglass infiltrates with recent history of COVID pneumonia in July   · Suspected obstructive sleep apnea/Obesity  · Lymphadenopathy, likely reactive  · Acute cholecystitis  · DM type II    Recommendations   · Continue IV antibiotics, Levaquin and Vanco, ID on consult  · Oxygen via nasal cannula if necessary  · Albuterol HFA Q 4 hours prn  · Incentive spirometry every hour while awake  · Continue IV fluids  · Blood sugar control per primary team  · CT surgery on consult, await input  · General surgery following, input noted, plans for IR to place cholecystostomy tube  · Blood and sputum cultures pending  · X-ray chest in am  · Labs: CBC and BMP in am  · PT/OT  · DVT prophylaxis with low molecular weight heparin  · Sleep study as an outpatient  · Will follow with you    Problem List      Patient Active Problem List   Diagnosis    Pneumonia    Abscess of lower lobe of right lung with pneumonia ([facility] [address] [address]    Acute cholecystitis    Multifocal pneumonia    Obesity due to excess calories    Diabetes mellitus type 2 in obese [facility])       HPI     Abe Espinoza is 45 y.o.,  male, admitted because of suspected right lower lobe lung abscess and acute cholecystitis. He was transferred from [facility] yesterday for further management. He has a history of COVID back in July. That time he was hospitalized for approximately 8 days at [facility].  He re-presented to the emergency room a few days ago with a cough with blood-tinged sputum as well as right upper quadrant abdominal pain. He was admitted to [place] [address], treated with IV Vanco and Levaquin while there. He was seen by ID. He was also seen by general surgery for acute cholecystitis, recommended cholecystostomy tube. He has a productive cough with pink to blood-tinged sputum. He denies chest pain. He denies any significant shortness of breath. He has been febrile intermittently. He denies any night sweats. He has a negative smoking history. He has never been checked for sleep apnea. He has a history of a 10-year incarceration. He was released about 1 year ago and at that time he was checked for TB and was negative. PMHx   Past Medical History      Diagnosis Date    Diabetes mellitus ([facility]       Past Surgical History    No past surgical history on file.     Meds    Current Medications    guaiFENesin  600 mg Oral BID    [Held by provider] metFORMIN  500 mg Oral BID WC    sodium chloride flush  10 mL Intravenous 2 times per day    [Held by provider] enoxaparin  40 mg Subcutaneous Daily    levofloxacin  750 mg Intravenous Q24H    vancomycin (VANCOCIN) intermittent dosing (placeholder)   Other RX Placeholder    vancomycin  2,000 mg Intravenous Q12H    famotidine (PEPCID) injection  20 mg Intravenous 2 times per day    insulin lispro  0-6 Units Subcutaneous TID WC    insulin lispro  0-3 Units Subcutaneous Nightly     albuterol sulfate HFA, sodium chloride flush, acetaminophen **OR** acetaminophen, magnesium hydroxide, nicotine, promethazine **OR** ondansetron, morphine, glucose, dextrose, glucagon (rDNA), dextrose  IV Drips/Infusions   sodium chloride 75 mL/hr at 09/23/20 0817    dextrose       Home Medications  Medications Prior to Admission: insulin glargine (LANTUS) 100 UNIT/ML injection vial, Inject 12 Units into the skin daily  insulin lispro (HUMALOG) 100 UNIT/ML injection vial, Inject 2-10 Units into the skin as needed (per sliding scale)  albuterol sulfate HFA (VENTOLIN HFA) 108 (90 Base) MCG/ACT inhaler, Inhale 2 puffs into the lungs every 6 hours as needed for Wheezing  ibuprofen (ADVIL;MOTRIN) 800 MG tablet, Take 800 mg by mouth every 8 hours as needed for Pain  metFORMIN (GLUCOPHAGE) 500 MG tablet, Take 500 mg by mouth 2 times daily (with meals)  [DISCONTINUED] ibuprofen (IBU) 800 MG tablet, Take 1 tablet by mouth every 6 hours as needed for Pain    Allergies    Sulfa antibiotics  Social History     Social History     Tobacco Use    Smoking status: Never Smoker    Smokeless tobacco: Never Used   Substance Use Topics    Alcohol use: Not on file     Results for [employer] (MRN [id number]) as of 9/23/2020 12:35   Ref. Range 9/23/2020 05:22   HIV Ag/Ab Latest Ref Range: NONREACTIVE  NONREACTIVE     Family History    No family history on file. ROS - 11 systems   General Denies any fever or chills  HEENT Denies any diplopia, tinnitus or vertigo  Resp positive for  cough with sputum and dyspnea  Cardiac Denies any chest pain, palpitations, claudication or edema  GI Denies any melena, hematochezia, hematemesis or pyrosis   Denies any frequency, urgency, hesitancy or incontinence  Heme Denies bruising or bleeding easily  Endocrine Denies any history of thyroid disease  Neuro Denies any focal motor or sensory deficits  Psychiatric Denies anxiety, depression, suicidal ideation  Skin Denies rashes, itching, open sores    Vitals     height is 5' 9\" (1.753 m) and weight is 220 lb (99.8 kg). His oral temperature is 98.2 °F (36.8 °C). His blood pressure is 123/75 and his pulse is 86. His respiration is 16 and oxygen saturation is 96%. Body mass index is 32.49 kg/m². I/O        Intake/Output Summary (Last 24 hours) at 9/23/2020 1221  Last data filed at 9/23/2020 0536  Gross per 24 hour   Intake 1264.4 ml   Output --   Net 1264.4 ml     I/O last 3 completed shifts:   In: 1264.4 [I.V.:1264.4]  Out: - Patient Vitals for the past 96 hrs (Last 3 readings):   Weight   09/22/20 1800 220 lb (99.8 kg)     Exam   General Appearance  Awake, alert, oriented, in no acute distress  HEENT - Head is normocephalic, atraumatic. Pupil reactive to light  Neck - Supple, symmetrical, trachea midline and Soft, trachea midline and straight  Lungs - positive findings:   Cardiovascular - Heart sounds are normal.  Regular rhythm normal rate without murmur, gallop or rub. Abdomen - Soft, nontender, nondistended, no masses or organomegaly  Neurologic - CN II-XII are grossly intact. There are no focal motor or sensory deficits  Skin - No bruising or bleeding  Extremities - No cyanosis, clubbing or edema    Labs  - Old records and notes have been reviewed in [facility]   CBC     Lab Results   Component Value Date    WBC 8.2 09/23/2020    RBC 3.77 09/23/2020    HGB 11.7 09/23/2020    HCT 36.2 09/23/2020     09/23/2020    MCV 96.0 09/23/2020    MCH 31.0 09/23/2020    MCHC 32.3 09/23/2020    RDW 13.3 09/23/2020     BMP   Lab Results   Component Value Date     09/23/2020    K 3.8 09/23/2020     09/23/2020    CO2 24 09/23/2020    BUN 6 09/23/2020    CREATININE 0.49 09/23/2020    GLUCOSE 205 09/23/2020    CALCIUM 8.3 09/23/2020     PTT  Lab Results   Component Value Date    APTT 41.0 (H) 09/23/2020     INR   Lab Results   Component Value Date    INR 1.3 09/23/2020    PROTIME 15.6 (H) 09/23/2020       Radiology    CXR  9/23/2020      CT Scans  9/23/2020    (See actual reports for details)    \"Thank you for asking us to see this patient\"    Case discussed with nurse and patient. Questions and concerns addressed.     Electronically signed by     FLORENCE Corona-CNP  Pulmonary Critical Care and Sleep Medicine,  Patient seen under the supervision of Negar Hart MD, [facility]

## 2020-09-23 NOTE — PROGRESS NOTES
Went to evaluate patient. Was found to be in airborne precautions and was a TB rule out. Chart reviewed. Spoke with nursing staff. Patient febrile on arrival. Transfer from [place] with concern for RLL abscess and acute cholecystitis. Patient had HIDA scan at [facility] which showed no contrast within the gallbladder at 60 minutes -- concerning for acute cholecystitis. Patient also has an area on the RLL of his lung which is consistent/concerning for abscess. Recent hx of COVID in July. \    COVID negative on presentation to [address] [place] [address] Ave. WBC and LFTs within normal limits. Patient febrile, but Otherwise vitals within normal limits. Patient with a regular heart rate. Normotensive. We will await TB test.  Per nursing patient having right upper quadrant pain. Given the fact patient has had possible acute cholecystitis for 72 hr with a right lower lobe pneumonia/ lung abscess, will recommend a cholecystostomy tube , which was recommendations from surgeons at outlying hospital as well. Recommend continuing antibiotic therapy at this point. Will re-evaluate tomorrow. Plan discussed with Dr. Sherie Hicks  Who is in agreement.     Electronically signed by Taylor Stratton DO on 9/22/2020 at 10:49 PM

## 2020-09-23 NOTE — PROGRESS NOTES
Informed by Apryl Kebede, respiratory therapist, that she spoke with Ming from respiratory, who recommended obtaining labs for HIV & TB tests to rule out. Pt has history of being in alf per H&P notes from [facility].  Informed LIZZETH Donohue NP, who ordered chest x-ray and TB test to be done. Lab informed to draw blood for tests.

## 2020-09-24 ENCOUNTER — APPOINTMENT (OUTPATIENT)
Dept: GENERAL RADIOLOGY | Age: 50
DRG: 137 | End: 2020-09-24
Attending: INTERNAL MEDICINE
Payer: MEDICAID

## 2020-09-24 LAB
ABSOLUTE EOS #: 0.23 K/UL (ref 0–0.44)
ABSOLUTE IMMATURE GRANULOCYTE: 0.03 K/UL (ref 0–0.3)
ABSOLUTE LYMPH #: 2.79 K/UL (ref 1.1–3.7)
ABSOLUTE MONO #: 0.48 K/UL (ref 0.1–1.2)
ANION GAP SERPL CALCULATED.3IONS-SCNC: 10 MMOL/L (ref 9–17)
BASOPHILS # BLD: 1 % (ref 0–2)
BASOPHILS ABSOLUTE: 0.05 K/UL (ref 0–0.2)
BUN BLDV-MCNC: 2 MG/DL (ref 6–20)
BUN/CREAT BLD: 4 (ref 9–20)
CALCIUM SERPL-MCNC: 8.9 MG/DL (ref 8.6–10.4)
CHLORIDE BLD-SCNC: 99 MMOL/L (ref 98–107)
CO2: 26 MMOL/L (ref 20–31)
CREAT SERPL-MCNC: 0.45 MG/DL (ref 0.7–1.2)
DIFFERENTIAL TYPE: ABNORMAL
EOSINOPHILS RELATIVE PERCENT: 3 % (ref 1–4)
GFR AFRICAN AMERICAN: >60 ML/MIN
GFR NON-AFRICAN AMERICAN: >60 ML/MIN
GFR SERPL CREATININE-BSD FRML MDRD: ABNORMAL ML/MIN/{1.73_M2}
GFR SERPL CREATININE-BSD FRML MDRD: ABNORMAL ML/MIN/{1.73_M2}
GLUCOSE BLD-MCNC: 184 MG/DL (ref 75–110)
GLUCOSE BLD-MCNC: 190 MG/DL (ref 75–110)
GLUCOSE BLD-MCNC: 221 MG/DL (ref 75–110)
GLUCOSE BLD-MCNC: 231 MG/DL (ref 70–99)
GLUCOSE BLD-MCNC: 286 MG/DL (ref 75–110)
HCT VFR BLD CALC: 37.5 % (ref 40.7–50.3)
HEMOGLOBIN: 11.9 G/DL (ref 13–17)
IMMATURE GRANULOCYTES: 0 %
LYMPHOCYTES # BLD: 40 % (ref 24–43)
MCH RBC QN AUTO: 30.7 PG (ref 25.2–33.5)
MCHC RBC AUTO-ENTMCNC: 31.7 G/DL (ref 28.4–34.8)
MCV RBC AUTO: 96.6 FL (ref 82.6–102.9)
MONOCYTES # BLD: 7 % (ref 3–12)
NRBC AUTOMATED: 0 PER 100 WBC
PDW BLD-RTO: 13.3 % (ref 11.8–14.4)
PLATELET # BLD: 276 K/UL (ref 138–453)
PLATELET ESTIMATE: ABNORMAL
PMV BLD AUTO: 9.7 FL (ref 8.1–13.5)
POTASSIUM SERPL-SCNC: 3.5 MMOL/L (ref 3.7–5.3)
RBC # BLD: 3.88 M/UL (ref 4.21–5.77)
RBC # BLD: ABNORMAL 10*6/UL
SEG NEUTROPHILS: 49 % (ref 36–65)
SEGMENTED NEUTROPHILS ABSOLUTE COUNT: 3.43 K/UL (ref 1.5–8.1)
SODIUM BLD-SCNC: 135 MMOL/L (ref 135–144)
WBC # BLD: 7 K/UL (ref 3.5–11.3)
WBC # BLD: ABNORMAL 10*3/UL

## 2020-09-24 PROCEDURE — 99233 SBSQ HOSP IP/OBS HIGH 50: CPT | Performed by: INTERNAL MEDICINE

## 2020-09-24 PROCEDURE — 71045 X-RAY EXAM CHEST 1 VIEW: CPT

## 2020-09-24 PROCEDURE — 80048 BASIC METABOLIC PNL TOTAL CA: CPT

## 2020-09-24 PROCEDURE — 85025 COMPLETE CBC W/AUTO DIFF WBC: CPT

## 2020-09-24 PROCEDURE — 2500000003 HC RX 250 WO HCPCS: Performed by: SURGERY

## 2020-09-24 PROCEDURE — 6370000000 HC RX 637 (ALT 250 FOR IP): Performed by: INTERNAL MEDICINE

## 2020-09-24 PROCEDURE — 2580000003 HC RX 258: Performed by: NURSE PRACTITIONER

## 2020-09-24 PROCEDURE — 36415 COLL VENOUS BLD VENIPUNCTURE: CPT

## 2020-09-24 PROCEDURE — 6360000002 HC RX W HCPCS: Performed by: SURGERY

## 2020-09-24 PROCEDURE — 2580000003 HC RX 258: Performed by: INTERNAL MEDICINE

## 2020-09-24 PROCEDURE — 6370000000 HC RX 637 (ALT 250 FOR IP): Performed by: NURSE PRACTITIONER

## 2020-09-24 PROCEDURE — 6360000002 HC RX W HCPCS: Performed by: INTERNAL MEDICINE

## 2020-09-24 PROCEDURE — 82947 ASSAY GLUCOSE BLOOD QUANT: CPT

## 2020-09-24 PROCEDURE — 2060000000 HC ICU INTERMEDIATE R&B

## 2020-09-24 RX ORDER — HYDROCODONE BITARTRATE AND ACETAMINOPHEN 10; 325 MG/1; MG/1
1 TABLET ORAL EVERY 4 HOURS PRN
Status: DISCONTINUED | OUTPATIENT
Start: 2020-09-24 | End: 2020-09-26 | Stop reason: HOSPADM

## 2020-09-24 RX ORDER — MORPHINE SULFATE 2 MG/ML
2 INJECTION, SOLUTION INTRAMUSCULAR; INTRAVENOUS EVERY 4 HOURS PRN
Status: DISCONTINUED | OUTPATIENT
Start: 2020-09-24 | End: 2020-09-26 | Stop reason: HOSPADM

## 2020-09-24 RX ADMIN — FAMOTIDINE 20 MG: 10 INJECTION INTRAVENOUS at 08:05

## 2020-09-24 RX ADMIN — INSULIN LISPRO 2 UNITS: 100 INJECTION, SOLUTION INTRAVENOUS; SUBCUTANEOUS at 17:22

## 2020-09-24 RX ADMIN — PIPERACILLIN AND TAZOBACTAM 3.38 G: 3; .375 INJECTION, POWDER, LYOPHILIZED, FOR SOLUTION INTRAVENOUS at 04:42

## 2020-09-24 RX ADMIN — INSULIN LISPRO 1 UNITS: 100 INJECTION, SOLUTION INTRAVENOUS; SUBCUTANEOUS at 12:34

## 2020-09-24 RX ADMIN — MORPHINE SULFATE 2 MG: 2 INJECTION, SOLUTION INTRAMUSCULAR; INTRAVENOUS at 08:12

## 2020-09-24 RX ADMIN — MORPHINE SULFATE 2 MG: 2 INJECTION, SOLUTION INTRAMUSCULAR; INTRAVENOUS at 11:20

## 2020-09-24 RX ADMIN — HYDROCODONE BITARTRATE AND ACETAMINOPHEN 1 TABLET: 10; 325 TABLET ORAL at 20:19

## 2020-09-24 RX ADMIN — INSULIN GLARGINE 12 UNITS: 100 INJECTION, SOLUTION SUBCUTANEOUS at 12:34

## 2020-09-24 RX ADMIN — Medication 10 ML: at 20:22

## 2020-09-24 RX ADMIN — FAMOTIDINE 20 MG: 10 INJECTION INTRAVENOUS at 20:21

## 2020-09-24 RX ADMIN — GUAIFENESIN 600 MG: 600 TABLET, EXTENDED RELEASE ORAL at 20:19

## 2020-09-24 RX ADMIN — MORPHINE SULFATE 2 MG: 2 INJECTION, SOLUTION INTRAMUSCULAR; INTRAVENOUS at 23:46

## 2020-09-24 RX ADMIN — PIPERACILLIN AND TAZOBACTAM 3.38 G: 3; .375 INJECTION, POWDER, LYOPHILIZED, FOR SOLUTION INTRAVENOUS at 21:02

## 2020-09-24 RX ADMIN — MORPHINE SULFATE 2 MG: 2 INJECTION, SOLUTION INTRAMUSCULAR; INTRAVENOUS at 04:39

## 2020-09-24 RX ADMIN — INSULIN LISPRO 2 UNITS: 100 INJECTION, SOLUTION INTRAVENOUS; SUBCUTANEOUS at 20:59

## 2020-09-24 RX ADMIN — SODIUM CHLORIDE: 9 INJECTION, SOLUTION INTRAVENOUS at 04:41

## 2020-09-24 RX ADMIN — VANCOMYCIN HYDROCHLORIDE 1750 MG: 1 INJECTION, POWDER, LYOPHILIZED, FOR SOLUTION INTRAVENOUS at 17:22

## 2020-09-24 RX ADMIN — PIPERACILLIN AND TAZOBACTAM 3.38 G: 3; .375 INJECTION, POWDER, LYOPHILIZED, FOR SOLUTION INTRAVENOUS at 12:30

## 2020-09-24 RX ADMIN — HYDROCODONE BITARTRATE AND ACETAMINOPHEN 1 TABLET: 10; 325 TABLET ORAL at 12:34

## 2020-09-24 RX ADMIN — VANCOMYCIN HYDROCHLORIDE 1750 MG: 1 INJECTION, POWDER, LYOPHILIZED, FOR SOLUTION INTRAVENOUS at 01:36

## 2020-09-24 RX ADMIN — VANCOMYCIN HYDROCHLORIDE 1750 MG: 1 INJECTION, POWDER, LYOPHILIZED, FOR SOLUTION INTRAVENOUS at 08:05

## 2020-09-24 RX ADMIN — MORPHINE SULFATE 2 MG: 2 INJECTION, SOLUTION INTRAMUSCULAR; INTRAVENOUS at 05:43

## 2020-09-24 ASSESSMENT — PAIN SCALES - GENERAL
PAINLEVEL_OUTOF10: 8
PAINLEVEL_OUTOF10: 6
PAINLEVEL_OUTOF10: 0
PAINLEVEL_OUTOF10: 9
PAINLEVEL_OUTOF10: 8
PAINLEVEL_OUTOF10: 9
PAINLEVEL_OUTOF10: 0
PAINLEVEL_OUTOF10: 0
PAINLEVEL_OUTOF10: 9
PAINLEVEL_OUTOF10: 8

## 2020-09-24 ASSESSMENT — ENCOUNTER SYMPTOMS
ALLERGIC/IMMUNOLOGIC NEGATIVE: 1
GASTROINTESTINAL NEGATIVE: 1
RESPIRATORY NEGATIVE: 1

## 2020-09-24 ASSESSMENT — PAIN DESCRIPTION - PROGRESSION: CLINICAL_PROGRESSION: GRADUALLY IMPROVING

## 2020-09-24 ASSESSMENT — PAIN DESCRIPTION - PAIN TYPE: TYPE: SURGICAL PAIN

## 2020-09-24 NOTE — PROGRESS NOTES
infectious disease, pulmonary consulted,  Patient sitting in the bed when I saw, still coughing, still having right upper quadrant abdominal pain,  Pulmonary, general surgery, infectious disease, CT surgery on board,  TB testing and HIV pending    Review of Systems:     Constitutional:  negative for chills, fevers, sweats  Respiratory: Shortness of breath and cough  Cardiovascular:  negative for chest pain, chest pressure/discomfort, lower extremity edema, palpitations  Gastrointestinal:  negative for abdominal pain, constipation, diarrhea, nausea, vomiting  Neurological:  negative for dizziness, headache    Medications: Allergies: Allergies   Allergen Reactions    Sulfa Antibiotics Hives and Rash       Current Meds:   Scheduled Meds:    guaiFENesin  600 mg Oral BID    piperacillin-tazobactam  3.375 g Intravenous Q8H    insulin glargine  12 Units Subcutaneous Daily    vancomycin  1,750 mg Intravenous Q8H    [Held by provider] metFORMIN  500 mg Oral BID WC    sodium chloride flush  10 mL Intravenous 2 times per day    [Held by provider] enoxaparin  40 mg Subcutaneous Daily    vancomycin (VANCOCIN) intermittent dosing (placeholder)   Other RX Placeholder    famotidine (PEPCID) injection  20 mg Intravenous 2 times per day    insulin lispro  0-6 Units Subcutaneous TID WC    insulin lispro  0-3 Units Subcutaneous Nightly     Continuous Infusions:    dextrose       PRN Meds: morphine, HYDROcodone-acetaminophen, albuterol sulfate HFA, sodium chloride flush, acetaminophen **OR** acetaminophen, magnesium hydroxide, nicotine, promethazine **OR** ondansetron, glucose, dextrose, glucagon (rDNA), dextrose    Data:     Past Medical History:   has a past medical history of Diabetes mellitus ([facility] Social History:   reports that he has never smoked. He has never used smokeless tobacco.     Family History: No family history on file.     Vitals:  /66   Pulse 83   Temp 98.4 °F (36.9 °C) (Oral)   Resp 22 Ht 5' 9\" (1.753 m)   Wt 220 lb (99.8 kg)   SpO2 96%   BMI 32.49 kg/m²   Temp (24hrs), Av.5 °F (36.9 °C), Min:98.1 °F (36.7 °C), Max:99.1 °F (37.3 °C)    Recent Labs     20  1120   POCGLU 112* 246* 184* 190*       I/O (24Hr): Intake/Output Summary (Last 24 hours) at 2020 1547  Last data filed at 2020 1246  Gross per 24 hour   Intake 2470 ml   Output 190 ml   Net 2280 ml       Labs:  Hematology:  Recent Labs     20  0529   WBC 8.2 7.0   RBC 3.77* 3.88*   HGB 11.7* 11.9*   HCT 36.2* 37.5*   MCV 96.0 96.6   MCH 31.0 30.7   MCHC 32.3 31.7   RDW 13.3 13.3    276   MPV 9.7 9.7   INR 1.3  --      Chemistry:  Recent Labs     20  0529   NA  --  135 135   K  --  3.8 3.5*   CL  --  102 99   CO2  --  24 26   GLUCOSE  --  205* 231*   BUN  --  6 2*   CREATININE 0.47* 0.49* 0.45*   ANIONGAP  --  9 10   LABGLOM >60 >60 >60   GFRAA >60 >60 >60   CALCIUM  --  8.3* 8.9     Recent Labs     20  1120   LABA1C 13.3*  --   --   --   --   --   --    POCGLU  --  184* 244* 112* 246* 184* 190*     ABG:No results found for: POCPH, PHART, PH, POCPCO2, SQJ0BJZ, PCO2, POCPO2, PO2ART, PO2, POCHCO3, ELZ2KCB, HCO3, NBEA, PBEA, BEART, BE, THGBART, THB, LMR2VXH, EYVH5OSH, O9CBTKPT, O2SAT, FIO2  Lab Results   Component Value Date/Time    SPECIAL NOT REPORTED 2020 07:37 PM     Lab Results   Component Value Date/Time    CULTURE PENDING 2020 07:37 PM       Radiology:  Xr Chest (single View Frontal)    Result Date: 2020  Focal airspace consolidation right lung base. No evidence of cavitation on this single frontal view. Ct Chest Wo Contrast    Result Date: 2020  1. Posterior right lower lobe 3.5 x 2.8 x 4 cm fluid and gas filled collection.   The morphology favors an intraparenchymal lung abscess rather metformin,  7. DVT and GI prophylaxis,  8.  Full CODE STATUS       Catrachito Huang MD  9/24/2020  3:47 PM

## 2020-09-24 NOTE — PROGRESS NOTES
IR physician spoke with Cardiothoracic. Chest tube on hold for now. Order removed, if chest tube needed in future, please place new order.

## 2020-09-24 NOTE — CARE COORDINATION
CAMELIA received message from pt insurance CM Lesly Licea regarding assisting with discharge planning. Leslycaterina Licea reports the pt is having trouble with not having enough food, not being able to pay his bills and needs a new PCP. CAMELIA returned call to Lesly Licea regarding the message was received and explained SW will provide pt with community resources to assist.   CAMELIA explained the hospital do not have funds to assist patients with rent or food.    Lesly Licea with [facility] can be reached at [phone]

## 2020-09-24 NOTE — PROGRESS NOTES
Pharmacy Note  Vancomycin Consult - Brief Note     Vancomycin Day: 3  Current Dose:  vancomycin 1750 q8h   Patient received 2 doses at 2000 mg q12h and trough was drawn prior to 3rd dose instead of 4th (9.9)  Daily dose regimen then increased. Trough is ordered for 9/25 @ 0100. If trough is 13 or greater I would continue this regimen, I don't think we need to target exactly 15 as we may still be achieving steady state. Patient's labs, cultures, vitals, and vancomycin regimen reviewed. No changes today. Current diagnosis for which MRSA is suspected/confirmed: suspected lung infection, ID is following. ONLY for suspected pneumonia or COPD: MRSA nasal swab  showed positive result on 9/22 . TEMP: 99  Calculated CrCl based on IBW:  >100  mL/min    Recent Labs     09/23/20  0522 09/24/20  0529   WBC 8.2 7.0     Recent Labs     09/23/20  0522 09/24/20  0529   CREATININE 0.49* 0.45*     Estimated Creatinine Clearance: 226 mL/min (A) (based on SCr of 0.45 mg/dL (L)). Intake/Output Summary (Last 24 hours) at 9/24/2020 0835  Last data filed at 9/24/2020 0444  Gross per 24 hour   Intake 2470 ml   Output 160 ml   Net 2310 ml         Thank you for the consult. Pharmacy will continue to follow.   Ernesto Etienne RPh/PharmD  9/24/2020 8:35 AM

## 2020-09-24 NOTE — PROGRESS NOTES
Pulmonary Critical Care Progress Note  FLORENCE Liu-JESICA/Ambika Thomas MD     Patient seen for the follow up of  Abscess of lower lobe of right lung with pneumonia ([address]     Subjective:  He is sitting up in bed, student nurse at bedside. No significant overnight events noted. He denies any shortness of breath or chest pain. He does have a productive cough with occasionally pink-tinged sputum. His right upper quadrant abdominal pain is resolved after having cholecystostomy tube placed yesterday, he has mild discomfort at tube insertion site. Examination:  Vitals: BP (!) 99/53   Pulse 74   Temp 99 °F (37.2 °C) (Oral)   Resp 16   Ht 5' 9\" (1.753 m)   Wt 220 lb (99.8 kg)   SpO2 95%   BMI 32.49 kg/m²   General appearance: alert and cooperative with exam, sitting up in bed  Neck: No JVD  Lungs: Moderate air exchange, no wheezing, rales or rhonchi  Heart: regular rate and rhythm, S1, S2 normal, no gallop  Abdomen: Soft, non tender, + BS  Extremities: no cyanosis or clubbing.  No significant edema    LABs:  CBC:   Recent Labs     09/23/20  0522 09/24/20  0529   WBC 8.2 7.0   HGB 11.7* 11.9*   HCT 36.2* 37.5*    276     BMP:   Recent Labs     09/23/20  0522 09/24/20  0529    135   K 3.8 3.5*   CO2 24 26   BUN 6 2*   CREATININE 0.49* 0.45*   LABGLOM >60 >60   GLUCOSE 205* 231*     PT/INR:   Recent Labs     09/23/20 0522   PROTIME 15.6*   INR 1.3     APTT:  Recent Labs     09/23/20  0522   APTT 41.0*     Radiology:  9/24/20      Impression:  · Right lower lobe pneumonia versus early abscess, 3.5 x 2.8 x 4 cm fluid and gas filled   collection  · Bilateral groundglass infiltrates with recent history of COVID pneumonia in July, significantly improving  · Suspected obstructive sleep apnea/Obesity  · Lymphadenopathy, likely reactive  · Acute cholecystitis s/p percutaneous cholecystostomy tube placement 9/23/2020  · DM type II, Uncontrolled    Recommendations:  · Continue IV antibiotics, Zosyn and Vanco, ID following  · Oxygen via nasal cannula if necessary  · Albuterol HFA Q 4 hours prn  · Incentive spirometry every hour while awake  · Discontinue IV fluids  · Blood sugar control per primary team  · CT surgery on consult, considering chest tube placement versus conservative treatment with IV antibiotics. · General surgery following  · Blood and sputum cultures pending  · X-ray chest in am  · Labs: CBC and BMP in am  · PT/OT  · DVT prophylaxis with low molecular weight heparin  · Sleep study as an outpatient  · Patient will need repeat CT chest in 4 to 6 weeks as an outpatient  · Discussed with RN  · Discussed with Dr. Andrei Villa   · Will follow with you      FLORENCE Valdez-CNP   Pulmonary Critical Care and Sleep Medicine,  Patient seen under the supervision of Marquis Salcido MD, [facility]    Patient seen and evaluated by me. He is up and about in his room. He is on any oxygen. He denies any shortness of breath or chest pain. He has occasional productive cough with pink-tinged sputum at times. His abdominal pain is resolved after percutaneous drain placed yesterday. Lung exam reveals moderate air exchange, no wheezing rales or rhonchi. Plan is to continue IV antibiotics per ID oxygen via nasal cannula if necessary. He is encouraged for incentive spirometry every hour while awake. Repeat x-ray chest in a.m. Recommend sleep study as an outpatient. He will also need repeat CT chest in 4 to 6 weeks to follow-up on resolution. Above was reviewed and discussed with Greta Kam CNP. And I agree with assessment and plan of care.   Electronically signed by     Glenny Mccormick MD on 9/24/2020 at 12:43 PM  Pulmonary Critical Care and Sleep Medicine,  [facility]  Cell: [phone]  Office: [phone]

## 2020-09-24 NOTE — PROGRESS NOTES
General Surgery:  Daily Progress Note                   PATIENT NAME: Mirta Pop     TODAY'S DATE: 9/24/2020, 6:28 AM  CC:    Abdominal pain and right-sided chest pain    SUBJECTIVE:     Pt seen and examined at bedside. Afebrile. Cholecystostomy tube placed yesterday. Patient says right upper quadrant abdominal pain has improved, but is now complaining of right flank lateral chest pain. OBJECTIVE:   VITALS:  /78   Pulse 82   Temp 98.1 °F (36.7 °C) (Oral)   Resp 16   Ht 5' 9\" (1.753 m)   Wt 220 lb (99.8 kg)   SpO2 95%   BMI 32.49 kg/m²      INTAKE/OUTPUT:      Intake/Output Summary (Last 24 hours) at 9/24/2020 5968  Last data filed at 9/24/2020 0444  Gross per 24 hour   Intake 2470 ml   Output 160 ml   Net 2310 ml       PHYSICAL EXAM:  General Appearance: awake, alert, oriented, in no acute distress  HEENT:  Normocephalic, atraumatic, mucus membranes moist   Heart: Heart regular rate and rhythm  Lungs:   No respiratory distress  Abdomen:  Soft, nondistended, mild tenderness to palpation right upper quadrant. Cholecystostomy in place with adequate biliary drainage. Extremities: No cyanosis, pitting edema, rashes noted. Skin: Skin color, texture, turgor normal. No rashes or lesions.       Data:  CBC with Differential:    Lab Results   Component Value Date    WBC 7.0 09/24/2020    RBC 3.88 09/24/2020    HGB 11.9 09/24/2020    HCT 37.5 09/24/2020     09/24/2020    MCV 96.6 09/24/2020    MCH 30.7 09/24/2020    MCHC 31.7 09/24/2020    RDW 13.3 09/24/2020    LYMPHOPCT 40 09/24/2020    MONOPCT 7 09/24/2020    BASOPCT 1 09/24/2020    MONOSABS 0.48 09/24/2020    LYMPHSABS 2.79 09/24/2020    EOSABS 0.23 09/24/2020    BASOSABS 0.05 09/24/2020    DIFFTYPE NOT REPORTED 09/24/2020     BMP:    Lab Results   Component Value Date     09/24/2020    K 3.5 09/24/2020    CL 99 09/24/2020    CO2 26 09/24/2020    BUN 2 09/24/2020    CREATININE 0.45 09/24/2020    CALCIUM 8.9 09/24/2020    GFRAA >60 09/24/2020    LABGLOM >60 09/24/2020    GLUCOSE 231 09/24/2020       Radiology Review:      ASSESSMENT:  Active Hospital Problems    Diagnosis Date Noted    At high risk for tuberculosis infection [Z91.89] 09/23/2020    Pneumonia [J18.9] 09/22/2020    [facility] of lower [facility] of [facility] with [facility] [J85.1] 09/22/2020    Acute cholecystitis [K81.0] 09/22/2020    Multifocal pneumonia [J18.9] 09/22/2020    Obesity due to excess calories [E66.09] 09/22/2020    Diabetes mellitus type 2 in obese ([facility] [E11.69, E66.9] 09/22/2020       1. Right lower lobe pneumonia with lung abscess  2.  acute cholecystitis status post cholecystostomy tube    Plan:  1. Diet:  NPO for surgery today  2. IV fluid hydration  3.  right lower lobe pneumonia per thoracic surgery and Infectious Disease  4. from a general surgery standpoint , patient will be discharged home with cholecystostomy tube with follow-up in 4-6 weeks for possible laparoscopic cholecystectomy once right lower lobe pneumonia improved and resolved. 5.   Medical management per primary team.  General surgery to follow along.     Electronically signed by Mae Berry DO  on 9/24/2020 at 6:28 AM

## 2020-09-24 NOTE — PROGRESS NOTES
[facility]  Progress Note    Rosibel Landrum  MRN: [id number]  Date: 9/24/2020  LOS: 2     Reason for F/U :   Pneumonia/lung abscess    Impression :   1. Recent COVID-19 infection and I was able to review the CT imaging from July and compared it to the current imaging and there is significant improvement in the groundglass opacities  2. Right lower lobe pneumonia versus early abscess  3. Cholecystitis  · Status post percutaneous drainage at IR 9/23/2020  4. Diabetes mellitus type 2    Recommendations:   · The care was discussed with the CT surgery team, pulmonary team, and interventional radiology. · At this point in time I would recommend conservative therapy with IV antibiotics and subsequent follow-up imaging  · The patient does have a percutaneous drain in place for the cholecystitis  · I will plan on 3 to 4 weeks of antimicrobial therapy with follow-up imaging    Infection Control Recommendations:   Contact precautions    Discharge Planning:   Estimated Length of IV antimicrobials: 3 to 4 weeks  Patient will need Midline Catheter Insertion/ PICC line Insertion: Yes  Patient will need: Home IV   Patient will need outpatient wound care: No    Medical Decision making / Summary of Stay:   Rosibel Landrum is a 45y.o.-year-old male who was initially admitted on 9/22/2020. Anastasia Antonio has a history of diabetes mellitus and was hospitalized in July 2020 with COVID-19 virus pneumonia treated with Redesivir and he recovered and was discharged after about a 7 to 8-day hospital stay. He reports a chronic cough since his discharge but otherwise he was doing well/better. The patient reports that on Sunday he developed epigastric pain that he initially described as a \"hunger pain\" but it persisted for over an hour and he felt like something was wrong so he ended up coming into the emergency department at [facility] where he had CT imaging that showed gallbladder inflammation.   The patient had an friction rub. No gallop. Pulmonary:      Effort: Pulmonary effort is normal.      Breath sounds: Normal breath sounds. No wheezing. Abdominal:      General: Bowel sounds are normal.      Palpations: Abdomen is soft. There is no mass. Tenderness: There is no abdominal tenderness. Comments: Percutaneous drain in place   Musculoskeletal: Normal range of motion. Lymphadenopathy:      Cervical: No cervical adenopathy. Skin:     General: Skin is warm and dry. Neurological:      Mental Status: He is alert and oriented to person, place, and time. Laboratory data:   I have independently reviewed the followinglabs:  CBC with Differential:   Recent Labs     09/23/20 0522 09/24/20 0529   WBC 8.2 7.0   HGB 11.7* 11.9*   HCT 36.2* 37.5*    276   LYMPHOPCT  --  40   MONOPCT  --  7     BMP:   Recent Labs     09/23/20 0522 09/24/20 0529    135   K 3.8 3.5*    99   CO2 24 26   BUN 6 2*   CREATININE 0.49* 0.45*     Hepatic Function Panel: No results for input(s): PROT, LABALBU, BILIDIR, IBILI, BILITOT, ALKPHOS, ALT, AST in the last 72 hours. No results found for: PROCAL  No results found for: CRP  No results found for: SEDRATE      No results found for: DDIMER  No results found for: FERRITIN  No results found for: LDH  No results found for: FIBRINOGEN    Results in Past 30 Days  Result Component Current Result Ref Range Previous Result Ref Range   SARS-CoV-2      (9/22/2020)  Not in Time Range          (9/22/2020)       Not Detected (9/22/2020) Not Detected       Lab Results   Component Value Date    COVID19 Not Detected 09/22/2020       Recent Labs     09/23/20 2012   [address] 9.9*       Imaging Studies:   No new imaging    Cultures:     Culture, Blood 1 [[id number]]   Collected: 09/22/20 2158    Order Status: Completed  Specimen: Blood  Updated: 09/24/20 0535     Specimen Description  . BLOOD     Special Requests  RT ARM,6CC     Culture  NO GROWTH 2 DAYS    Culture, Anaerobic and Aerobic [[id number]]  (Abnormal)  Collected: 09/23/20 1937    Order Status: Completed  Specimen: Ascitic Fluid  Updated: 09/23/20 2350     Specimen Description  . ASCITIC FLUID     Special Requests  NOT REPORTED     Direct Exam  NO NEUTROPHILS SEEN      FEW BUDDING YEASTAbnormal       Culture  PENDING      MRSA DNA Probe, Nasal [[id number]]  (Abnormal)  Collected: 09/22/20 2150    Order Status: Completed  Specimen: Nasal  Updated: 09/23/20 1431     Specimen Description  . NASAL SWAB     MRSA, DNA, Nasal  POSITIVE:  MRSA DNA detected by nucleic acid amplification. Abnormal       Comment:                                                     Results should be used as an adjunct to nosocomial control efforts to identify patients   needing enhanced precautions.     The test is not intended to identify patients with staphylococcal infections.  Results   should not be used to guide or monitor treatment for MRSA infections. Medications:      guaiFENesin  600 mg Oral BID    piperacillin-tazobactam  3.375 g Intravenous Q8H    insulin glargine  12 Units Subcutaneous Daily    vancomycin  1,750 mg Intravenous Q8H    [Held by provider] metFORMIN  500 mg Oral BID WC    sodium chloride flush  10 mL Intravenous 2 times per day    [Held by provider] enoxaparin  40 mg Subcutaneous Daily    vancomycin (VANCOCIN) intermittent dosing (placeholder)   Other RX Placeholder    famotidine (PEPCID) injection  20 mg Intravenous 2 times per day    insulin lispro  0-6 Units Subcutaneous TID WC    insulin lispro  0-3 Units Subcutaneous Nightly           [facility]  Kaylan Hartman MD  [employer] messaging  OFFICE: [phone]      Electronically signed by Kaylan Hartman MD on 9/24/2020 at 10:34 AM  Thank you for allowing us to participate in the care of this patient. Please call with questions.     This note iscreated with the assistance of a speech recognition program.  While intending to generate a document that actually reflects the content of the visit, the document can still have some errors including those of syntax andsound a like substitutions which may escape proof reading. In such instances, actual meaning can be extrapolated by contextual diversion.

## 2020-09-24 NOTE — PLAN OF CARE
Problem: Pain:  Goal: Pain level will decrease  Description: Pain level will decrease  Outcome: Ongoing  Note: Monitoring pain with each assessment and prn. MABLE 0-10 pain scale utilized. Non-pharmacological measures to be encouraged prior to pharmacological measures. Goal: Control of acute pain  Description: Control of acute pain  Outcome: Ongoing  Goal: Control of chronic pain  Description: Control of chronic pain  Outcome: Ongoing     Problem: Nutritional:  Goal: Nutritional status will improve  Description: Nutritional status will improve  Outcome: Ongoing  Note: Review diet daily and as needed with pt. Provide supplement nutrition as ordered by physician & educate pt. Review nutritional guidelines with pt. Problem: Physical Regulation:  Goal: Diagnostic test results will improve  Description: Diagnostic test results will improve  Outcome: Ongoing  Note: Assessed temperature for fever or for hypothermia signs or symptoms. Implemented protocol for either fever or hypothermia. Goal: Will remain free from infection  Description: Will remain free from infection  Outcome: Ongoing  Note: Monitor for signs & symptoms of infection. Utilize standard precautions & proper handwashing. Communicate referral to infection control. Goal: Ability to maintain vital signs within normal range will improve  Description: Ability to maintain vital signs within normal range will improve  Outcome: Ongoing  Note: Monitor vital signs at least every shift & as needed. Monitor intake & output at least every shift. Problem: Respiratory:  Goal: Ability to maintain normal respiratory secretions will improve  Description: Ability to maintain normal respiratory secretions will improve  Outcome: Ongoing  Note: Assess for adventitious breath sounds. Monitored SaO2 > 90%. Applied 02 per nasal cannula as needed. Elevated HOB to improve breathing as needed.         Problem: Skin Integrity:  Goal: Demonstration of wound healing without infection will improve  Description: Demonstration of wound healing without infection will improve  Outcome: Ongoing  Note: Assess wound for signs of healing, size, appearance, & drainage. Assess pulses & for pain. Goal: Complications related to intravenous access or infusion will be avoided or minimized  Description: Complications related to intravenous access or infusion will be avoided or minimized  Outcome: Ongoing     Problem: Falls - Risk of:  Goal: Will remain free from falls  Description: Will remain free from falls  Outcome: Ongoing  Note: Pt fall risk, fall band present, falling star, safety alarm activated and in use as needed. Hourly rounding performed. Pt encouraged to use call light. See Yennifer Noel fall risk assessment. Goal: Absence of physical injury  Description: Absence of physical injury  Outcome: Ongoing  Note: Non-skid socks in place, up with assistance, bed in lowest position, bed exit & alarm as needed, provide toileting every 2 hours an d as needed.

## 2020-09-24 NOTE — CARE COORDINATION
Per discussion with Dr. Alma Melendez, he would like to proceed with a standard course of antibiotics prior to any invasive procedure to address right lower lobe abscess. Dr Ivory García is agreeable at this time with plan of care. Therefore no surgical intervention is needed. Cardiothoracic surgery team will sign off at this time. Thank you for including us in this patient's care.     Lamont Juarez, FLORENCE - CNP

## 2020-09-25 ENCOUNTER — APPOINTMENT (OUTPATIENT)
Dept: GENERAL RADIOLOGY | Age: 50
DRG: 137 | End: 2020-09-25
Attending: INTERNAL MEDICINE
Payer: MEDICAID

## 2020-09-25 ENCOUNTER — APPOINTMENT (OUTPATIENT)
Dept: INTERVENTIONAL RADIOLOGY/VASCULAR | Age: 50
DRG: 137 | End: 2020-09-25
Attending: INTERNAL MEDICINE
Payer: MEDICAID

## 2020-09-25 VITALS
DIASTOLIC BLOOD PRESSURE: 65 MMHG | HEIGHT: 69 IN | HEART RATE: 94 BPM | TEMPERATURE: 98.6 F | SYSTOLIC BLOOD PRESSURE: 135 MMHG | RESPIRATION RATE: 18 BRPM | WEIGHT: 200 LBS | BODY MASS INDEX: 29.62 KG/M2 | OXYGEN SATURATION: 96 %

## 2020-09-25 LAB
ABSOLUTE EOS #: 0.27 K/UL (ref 0–0.44)
ABSOLUTE IMMATURE GRANULOCYTE: 0.02 K/UL (ref 0–0.3)
ABSOLUTE LYMPH #: 3.46 K/UL (ref 1.1–3.7)
ABSOLUTE MONO #: 0.6 K/UL (ref 0.1–1.2)
ANION GAP SERPL CALCULATED.3IONS-SCNC: 11 MMOL/L (ref 9–17)
BASOPHILS # BLD: 1 % (ref 0–2)
BASOPHILS ABSOLUTE: 0.06 K/UL (ref 0–0.2)
BUN BLDV-MCNC: 4 MG/DL (ref 6–20)
BUN/CREAT BLD: 4 (ref 9–20)
CALCIUM SERPL-MCNC: 9.4 MG/DL (ref 8.6–10.4)
CHLORIDE BLD-SCNC: 102 MMOL/L (ref 98–107)
CO2: 25 MMOL/L (ref 20–31)
CREAT SERPL-MCNC: 1.04 MG/DL (ref 0.7–1.2)
DIFFERENTIAL TYPE: ABNORMAL
EOSINOPHILS RELATIVE PERCENT: 3 % (ref 1–4)
GFR AFRICAN AMERICAN: >60 ML/MIN
GFR NON-AFRICAN AMERICAN: >60 ML/MIN
GFR SERPL CREATININE-BSD FRML MDRD: ABNORMAL ML/MIN/{1.73_M2}
GFR SERPL CREATININE-BSD FRML MDRD: ABNORMAL ML/MIN/{1.73_M2}
GLUCOSE BLD-MCNC: 167 MG/DL (ref 75–110)
GLUCOSE BLD-MCNC: 175 MG/DL (ref 70–99)
GLUCOSE BLD-MCNC: 209 MG/DL (ref 75–110)
GLUCOSE BLD-MCNC: 245 MG/DL (ref 75–110)
GLUCOSE BLD-MCNC: 260 MG/DL (ref 75–110)
HCT VFR BLD CALC: 41.1 % (ref 40.7–50.3)
HEMOGLOBIN: 12.9 G/DL (ref 13–17)
IMMATURE GRANULOCYTES: 0 %
LYMPHOCYTES # BLD: 41 % (ref 24–43)
MCH RBC QN AUTO: 30.5 PG (ref 25.2–33.5)
MCHC RBC AUTO-ENTMCNC: 31.4 G/DL (ref 28.4–34.8)
MCV RBC AUTO: 97.2 FL (ref 82.6–102.9)
MONOCYTES # BLD: 7 % (ref 3–12)
NRBC AUTOMATED: 0 PER 100 WBC
PDW BLD-RTO: 13.5 % (ref 11.8–14.4)
PLATELET # BLD: 340 K/UL (ref 138–453)
PLATELET ESTIMATE: ABNORMAL
PMV BLD AUTO: 9.2 FL (ref 8.1–13.5)
POTASSIUM SERPL-SCNC: 4.1 MMOL/L (ref 3.7–5.3)
RBC # BLD: 4.23 M/UL (ref 4.21–5.77)
RBC # BLD: ABNORMAL 10*6/UL
SEG NEUTROPHILS: 48 % (ref 36–65)
SEGMENTED NEUTROPHILS ABSOLUTE COUNT: 4.11 K/UL (ref 1.5–8.1)
SODIUM BLD-SCNC: 138 MMOL/L (ref 135–144)
VANCOMYCIN TROUGH DATE LAST DOSE: ABNORMAL
VANCOMYCIN TROUGH DOSE AMOUNT: ABNORMAL
VANCOMYCIN TROUGH TIME LAST DOSE: ABNORMAL
VANCOMYCIN TROUGH: 21.9 UG/ML (ref 10–20)
WBC # BLD: 8.5 K/UL (ref 3.5–11.3)
WBC # BLD: ABNORMAL 10*3/UL

## 2020-09-25 PROCEDURE — 2060000000 HC ICU INTERMEDIATE R&B

## 2020-09-25 PROCEDURE — 6370000000 HC RX 637 (ALT 250 FOR IP): Performed by: INTERNAL MEDICINE

## 2020-09-25 PROCEDURE — 6370000000 HC RX 637 (ALT 250 FOR IP): Performed by: NURSE PRACTITIONER

## 2020-09-25 PROCEDURE — 6360000002 HC RX W HCPCS: Performed by: INTERNAL MEDICINE

## 2020-09-25 PROCEDURE — 05H933Z INSERTION OF INFUSION DEVICE INTO RIGHT BRACHIAL VEIN, PERCUTANEOUS APPROACH: ICD-10-PCS | Performed by: INTERNAL MEDICINE

## 2020-09-25 PROCEDURE — 2500000003 HC RX 250 WO HCPCS: Performed by: SURGERY

## 2020-09-25 PROCEDURE — 80048 BASIC METABOLIC PNL TOTAL CA: CPT

## 2020-09-25 PROCEDURE — 36410 VNPNXR 3YR/> PHY/QHP DX/THER: CPT

## 2020-09-25 PROCEDURE — 99232 SBSQ HOSP IP/OBS MODERATE 35: CPT | Performed by: INTERNAL MEDICINE

## 2020-09-25 PROCEDURE — 80202 ASSAY OF VANCOMYCIN: CPT

## 2020-09-25 PROCEDURE — 2580000003 HC RX 258: Performed by: INTERNAL MEDICINE

## 2020-09-25 PROCEDURE — 36415 COLL VENOUS BLD VENIPUNCTURE: CPT

## 2020-09-25 PROCEDURE — 85025 COMPLETE CBC W/AUTO DIFF WBC: CPT

## 2020-09-25 PROCEDURE — 71045 X-RAY EXAM CHEST 1 VIEW: CPT

## 2020-09-25 PROCEDURE — 76937 US GUIDE VASCULAR ACCESS: CPT

## 2020-09-25 PROCEDURE — 82947 ASSAY GLUCOSE BLOOD QUANT: CPT

## 2020-09-25 PROCEDURE — 2580000003 HC RX 258: Performed by: NURSE PRACTITIONER

## 2020-09-25 RX ORDER — FLUCONAZOLE 100 MG/1
200 TABLET ORAL DAILY
Status: DISCONTINUED | OUTPATIENT
Start: 2020-09-25 | End: 2020-09-26 | Stop reason: HOSPADM

## 2020-09-25 RX ORDER — FLUCONAZOLE 200 MG/1
200 TABLET ORAL DAILY
Qty: 7 TABLET | Refills: 0 | Status: SHIPPED | OUTPATIENT
Start: 2020-09-25 | End: 2020-10-02

## 2020-09-25 RX ADMIN — HYDROCODONE BITARTRATE AND ACETAMINOPHEN 1 TABLET: 10; 325 TABLET ORAL at 07:13

## 2020-09-25 RX ADMIN — CEFTAROLINE FOSAMIL 600 MG: 600 POWDER, FOR SOLUTION INTRAVENOUS at 17:05

## 2020-09-25 RX ADMIN — HYDROCODONE BITARTRATE AND ACETAMINOPHEN 1 TABLET: 10; 325 TABLET ORAL at 11:53

## 2020-09-25 RX ADMIN — FLUCONAZOLE 200 MG: 100 TABLET ORAL at 13:03

## 2020-09-25 RX ADMIN — GUAIFENESIN 600 MG: 600 TABLET, EXTENDED RELEASE ORAL at 20:49

## 2020-09-25 RX ADMIN — HYDROCODONE BITARTRATE AND ACETAMINOPHEN 1 TABLET: 10; 325 TABLET ORAL at 02:57

## 2020-09-25 RX ADMIN — INSULIN LISPRO 1 UNITS: 100 INJECTION, SOLUTION INTRAVENOUS; SUBCUTANEOUS at 08:17

## 2020-09-25 RX ADMIN — MORPHINE SULFATE 2 MG: 2 INJECTION, SOLUTION INTRAMUSCULAR; INTRAVENOUS at 04:03

## 2020-09-25 RX ADMIN — MORPHINE SULFATE 2 MG: 2 INJECTION, SOLUTION INTRAMUSCULAR; INTRAVENOUS at 23:37

## 2020-09-25 RX ADMIN — Medication 10 ML: at 20:49

## 2020-09-25 RX ADMIN — INSULIN LISPRO 2 UNITS: 100 INJECTION, SOLUTION INTRAVENOUS; SUBCUTANEOUS at 20:50

## 2020-09-25 RX ADMIN — VANCOMYCIN HYDROCHLORIDE 1500 MG: 1.5 INJECTION, POWDER, LYOPHILIZED, FOR SOLUTION INTRAVENOUS at 08:17

## 2020-09-25 RX ADMIN — PIPERACILLIN AND TAZOBACTAM 3.38 G: 3; .375 INJECTION, POWDER, LYOPHILIZED, FOR SOLUTION INTRAVENOUS at 11:53

## 2020-09-25 RX ADMIN — INSULIN LISPRO 2 UNITS: 100 INJECTION, SOLUTION INTRAVENOUS; SUBCUTANEOUS at 13:04

## 2020-09-25 RX ADMIN — INSULIN GLARGINE 12 UNITS: 100 INJECTION, SOLUTION SUBCUTANEOUS at 08:18

## 2020-09-25 RX ADMIN — HYDROCODONE BITARTRATE AND ACETAMINOPHEN 1 TABLET: 10; 325 TABLET ORAL at 21:58

## 2020-09-25 RX ADMIN — FAMOTIDINE 20 MG: 10 INJECTION INTRAVENOUS at 08:17

## 2020-09-25 RX ADMIN — PIPERACILLIN AND TAZOBACTAM 3.38 G: 3; .375 INJECTION, POWDER, LYOPHILIZED, FOR SOLUTION INTRAVENOUS at 04:05

## 2020-09-25 RX ADMIN — FAMOTIDINE 20 MG: 10 INJECTION INTRAVENOUS at 20:49

## 2020-09-25 RX ADMIN — INSULIN LISPRO 2 UNITS: 100 INJECTION, SOLUTION INTRAVENOUS; SUBCUTANEOUS at 17:05

## 2020-09-25 RX ADMIN — GUAIFENESIN 600 MG: 600 TABLET, EXTENDED RELEASE ORAL at 08:17

## 2020-09-25 ASSESSMENT — PAIN - FUNCTIONAL ASSESSMENT
PAIN_FUNCTIONAL_ASSESSMENT: PREVENTS OR INTERFERES SOME ACTIVE ACTIVITIES AND ADLS
PAIN_FUNCTIONAL_ASSESSMENT: PREVENTS OR INTERFERES SOME ACTIVE ACTIVITIES AND ADLS

## 2020-09-25 ASSESSMENT — PAIN SCALES - GENERAL
PAINLEVEL_OUTOF10: 7
PAINLEVEL_OUTOF10: 7
PAINLEVEL_OUTOF10: 8
PAINLEVEL_OUTOF10: 0
PAINLEVEL_OUTOF10: 8
PAINLEVEL_OUTOF10: 7

## 2020-09-25 ASSESSMENT — PAIN DESCRIPTION - PAIN TYPE
TYPE: ACUTE PAIN

## 2020-09-25 ASSESSMENT — ENCOUNTER SYMPTOMS
RESPIRATORY NEGATIVE: 1
GASTROINTESTINAL NEGATIVE: 1
ALLERGIC/IMMUNOLOGIC NEGATIVE: 1

## 2020-09-25 ASSESSMENT — PAIN DESCRIPTION - LOCATION
LOCATION: ABDOMEN

## 2020-09-25 ASSESSMENT — PAIN DESCRIPTION - PROGRESSION
CLINICAL_PROGRESSION: GRADUALLY WORSENING
CLINICAL_PROGRESSION: GRADUALLY WORSENING

## 2020-09-25 ASSESSMENT — PAIN DESCRIPTION - FREQUENCY
FREQUENCY: INTERMITTENT
FREQUENCY: CONTINUOUS

## 2020-09-25 ASSESSMENT — PAIN DESCRIPTION - DESCRIPTORS
DESCRIPTORS: ACHING
DESCRIPTORS: ACHING

## 2020-09-25 ASSESSMENT — PAIN DESCRIPTION - ONSET
ONSET: GRADUAL
ONSET: GRADUAL

## 2020-09-25 ASSESSMENT — PAIN DESCRIPTION - ORIENTATION
ORIENTATION: RIGHT

## 2020-09-25 NOTE — PROGRESS NOTES
General Surgery:  Daily Progress Note                   PATIENT NAME: Abe Espinoza     TODAY'S DATE: 9/25/2020, 5:55 AM  CC:    Abdominal pain and right-sided chest pain    SUBJECTIVE:     Pt seen and examined at bedside. Cholecystostomy tube still draining. Abdominal pain improved. Afebrile overnight. No acute issues. OBJECTIVE:   VITALS:  /70   Pulse 96   Temp 98.6 °F (37 °C) (Oral)   Resp 18   Ht 5' 9\" (1.753 m)   Wt 220 lb (99.8 kg)   SpO2 93%   BMI 32.49 kg/m²      INTAKE/OUTPUT:      Intake/Output Summary (Last 24 hours) at 9/25/2020 0555  Last data filed at 9/25/2020 0407  Gross per 24 hour   Intake 592.8 ml   Output 100 ml   Net 492.8 ml       PHYSICAL EXAM:  General Appearance: awake, alert, oriented, in no acute distress  HEENT:  Normocephalic, atraumatic, mucus membranes moist   Heart: Heart regular rate and rhythm  Lungs:   No respiratory distress  Abdomen:  Soft, nondistended, mild tenderness to palpation right upper quadrant. Cholecystostomy in place with adequate biliary drainage. Extremities: No cyanosis, pitting edema, rashes noted. Skin: Skin color, texture, turgor normal. No rashes or lesions.       Data:  CBC with Differential:    Lab Results   Component Value Date    WBC 7.0 09/24/2020    RBC 3.88 09/24/2020    HGB 11.9 09/24/2020    HCT 37.5 09/24/2020     09/24/2020    MCV 96.6 09/24/2020    MCH 30.7 09/24/2020    MCHC 31.7 09/24/2020    RDW 13.3 09/24/2020    LYMPHOPCT 40 09/24/2020    MONOPCT 7 09/24/2020    BASOPCT 1 09/24/2020    MONOSABS 0.48 09/24/2020    LYMPHSABS 2.79 09/24/2020    EOSABS 0.23 09/24/2020    BASOSABS 0.05 09/24/2020    DIFFTYPE NOT REPORTED 09/24/2020     BMP:    Lab Results   Component Value Date     09/24/2020    K 3.5 09/24/2020    CL 99 09/24/2020    CO2 26 09/24/2020    BUN 2 09/24/2020    CREATININE 0.45 09/24/2020    CALCIUM 8.9 09/24/2020    GFRAA >60 09/24/2020    LABGLOM >60 09/24/2020    GLUCOSE 231 09/24/2020 Radiology Review:      ASSESSMENT:  Active Hospital Problems    Diagnosis Date Noted    At high risk for tuberculosis infection [Z91.89] 09/23/2020    Pneumonia [J18.9] 09/22/2020    Abscess of lower lobe of right lung with pneumonia ([facility] [J85.1] 09/22/2020    Acute cholecystitis [K81.0] 09/22/2020    Multifocal pneumonia [J18.9] 09/22/2020    Obesity due to excess calories [E66.09] 09/22/2020    Diabetes mellitus type 2 in obese ([facility] [E11.69, E66.9] 09/22/2020       1. Right lower lobe pneumonia with lung abscess  2.  acute cholecystitis status post cholecystostomy tube    Plan:  1. Diet: Regular diet  2. Antibiotics per infectious disease  3. Surgery standpoint okay for discharge as long as patient is tolerating diet. Will be discharged with cholecystostomy tube follow-up with Dr. Yeny Banegas in 4-6 weeks to discuss elective cholecystectomy.   4. Medical management per primary team    Electronically signed by Gisela Chowdhury DO  on 9/25/2020 at 5:55 AM

## 2020-09-25 NOTE — PROGRESS NOTES
Occupational Therapy   [facility]  Occupational Therapy Not Seen Note    Patient not available for Occupational Therapy due to:    [] Testing:    [] Hemodialysis    [] Cancelled by RN:    []Refusal by Patient:    [] Surgery:     [] Intubation:     [] Pain Medication:    [] Sedation:     [] Spine Precautions :    [] Medical Instability:    [x] Other: D/C OT, pt is independent  Preston Shultz OTR/ROSY

## 2020-09-25 NOTE — CARE COORDINATION
Discharge planning    Spoke with Dr Kwame Coelho from [facility] to confirm iv atb for home. He will send patient home on Teflaro 600 mg bid. He will print out script for writer later today along with adding labs needed to erica    Call to [facility],spoke with Chelo Trotter, and they do accept [employer] ( certain policies) faxed over face sheet for her to run benefits. Await call back     Did call [facility] back and ask them to review again since atb has changed. Message left with their intake. [facility] called back and they can accept patient starting tomorrow night. Spoke with Zeinab Fritz and will have to time teflaro for 6 pm tonight and 0600 tomorrow.      ERICA in epic

## 2020-09-25 NOTE — CARE COORDINATION
Discharge planning    Chart reviewed. Patient will need iv atb for pna vs abscess. No cardiothoracic surgery at this time. Notified by  this am that [facility] they are unable to accept patient. Await [facility] decision. Faxed over ID notes, med sheets to [facility]. They can accept. Will reach out to Nafisa the rep to see if she knows which home care agency should try with his [employer] benefits. Did [facility] fax to [facility] care and interim to see if can accept [place]. [phone] interim call back and cannot accept. Call to [phone] to follow up on referral sent yesterday . Spoke with intake and dont accept insurance. notified Gonzalo Chambers from [place] to see if can assist.     [facility] but would need clarification on atb dosing. . right now zosyn and vanco every 8 hours which they could not staff. Will talk to [facility]    RICCARDO in [facility]     1. GEN SURG    Right lower lobe pneumonia with lung abscess  2.  acute cholecystitis status post cholecystostomy tube     Plan:  1. Diet: Regular diet  2. Antibiotics per infectious disease  3. Surgery standpoint okay for discharge as long as patient is tolerating diet. Will be discharged with cholecystostomy tube follow-up with Dr. Tanvi Tamez in 4-6 weeks to discuss elective cholecystectomy. ID    Impression :   3. Recent COVID-19 infection and I was able to review the CT imaging from July and compared it to the current imaging and there is significant improvement in the groundglass opacities  4. Right lower lobe pneumonia versus early abscess  5. Cholecystitis  · Status post percutaneous drainage at IR 9/23/2020  4. Diabetes mellitus type 2     Recommendations:   · The care was discussed with the CT surgery team, pulmonary team, and interventional radiology.   · At this point in time I would recommend conservative therapy with IV antibiotics and subsequent follow-up imaging  · The patient does have a percutaneous drain in place for the cholecystitis  · I will plan on 3 to 4 weeks of antimicrobial therapy with follow-up imaging

## 2020-09-25 NOTE — PROGRESS NOTES
[facility]  Progress Note    Gabby Pang  MRN: [id number]  Date: 9/25/2020  LOS: 3     Reason for F/U :   Pneumonia/lung abscess    Impression :   1. Recent COVID-19 infection and I was able to review the CT imaging from July and compared it to the current imaging and there is significant improvement in the groundglass opacities  2. Right lower lobe pneumonia versus early abscess  3. Cholecystitis  · Status post percutaneous drainage at IR 9/23/2020  4. Diabetes mellitus type 2    Recommendations:   · The care was discussed with the patient as well as with the care navigation team.  · They are having a hard time finding home care to do antimicrobial therapy 3 times a day. · I will plan on switching him to Teflaro and oral fluconazole through October 13, 2020  · The patient should be okay for discharge once all arrangements have been made    Infection Control Recommendations:   Contact precautions    Discharge Planning:   Estimated Length of IV antimicrobials: 10/13/2020  Patient will need Midline Catheter Insertion  Patient will need: Home IV   Patient will need outpatient wound care: No    Medical Decision making / Summary of Stay:   Gabby Pang is a 45y.o.-year-old male who was initially admitted on 9/22/2020. Misha Rivas has a history of diabetes mellitus and was hospitalized in July 2020 with COVID-19 virus pneumonia treated with Redesivir and he recovered and was discharged after about a 7 to 8-day hospital stay. He reports a chronic cough since his discharge but otherwise he was doing well/better. The patient reports that on Sunday he developed epigastric pain that he initially described as a \"hunger pain\" but it persisted for over an hour and he felt like something was wrong so he ended up coming into the emergency department at [place] [address] where he had CT imaging that showed gallbladder inflammation.   The patient had an ultrasound that confirmed gallbladder wall thickening and gall stones. The patient had a HIDA scan done and was positive for acute cholecystitis . The patient reports having 2 bouts of emesis at home without any blood and mostly watery but never had any diarrhea. He never had any associated fevers, chills or sweats. Again his breathing had been improving and though he had a persistent cough this was there since his COVID infection. The patient was also incidentally noted to have a lung lesion raising concern for an abscess on the CT scan.      The patient was transferred here from [facility] due to insurance issues and l was asked to evaluate for the pneumonia/abscess. Current evaluation:2020    /68   Pulse 72   Temp 98.4 °F (36.9 °C) (Oral)   Resp 18   Ht 5' 9\" (1.753 m)   Wt 200 lb (90.7 kg)   SpO2 96%   BMI 29.53 kg/m²     Temperature Range: Temp: 98.4 °F (36.9 °C) Temp  Av.4 °F (36.9 °C)  Min: 98.2 °F (36.8 °C)  Max: 98.6 °F (37 °C)   The patient is seen and evaluated at bedside he is awake and alert in no acute distress. He does report some pain at the right upper quadrant drain site. The epigastric pain is resolved. No subjective fevers or chills. No nausea vomiting or diarrhea. No dysuria frequency    Review of Systems   Constitutional: Negative. Respiratory: Negative. Cardiovascular: Negative. Gastrointestinal: Negative. Genitourinary: Negative. Musculoskeletal: Negative. Allergic/Immunologic: Negative. Neurological: Negative. Physical Examination :     Physical Exam  Constitutional:       Appearance: He is well-developed. HENT:      Head: Normocephalic and atraumatic. Neck:      Musculoskeletal: Normal range of motion and neck supple. Cardiovascular:      Rate and Rhythm: Normal rate. Heart sounds: Normal heart sounds. No friction rub. No gallop. Pulmonary:      Effort: Pulmonary effort is normal.      Breath sounds: Normal breath sounds. No wheezing.    Abdominal: General: Bowel sounds are normal.      Palpations: Abdomen is soft. There is no mass. Tenderness: There is no abdominal tenderness. Comments: Percutaneous drain in place   Musculoskeletal: Normal range of motion. Lymphadenopathy:      Cervical: No cervical adenopathy. Skin:     General: Skin is warm and dry. Neurological:      Mental Status: He is alert and oriented to person, place, and time. Laboratory data:   I have independently reviewed the followinglabs:  CBC with Differential:   Recent Labs     09/24/20  0529 09/25/20  0550   WBC 7.0 8.5   HGB 11.9* 12.9*   HCT 37.5* 41.1    340   LYMPHOPCT 40 41   MONOPCT 7 7     BMP:   Recent Labs     09/24/20  0529 09/25/20  0550    138   K 3.5* 4.1   CL 99 102   CO2 26 25   BUN 2* 4*   CREATININE 0.45* 1.04     Hepatic Function Panel: No results for input(s): PROT, LABALBU, BILIDIR, IBILI, BILITOT, ALKPHOS, ALT, AST in the last 72 hours. No results found for: PROCAL  No results found for: CRP  No results found for: SEDRATE      No results found for: DDIMER  No results found for: FERRITIN  No results found for: LDH  No results found for: FIBRINOGEN    Results in Past 30 Days  Result Component Current Result Ref Range Previous Result Ref Range   SARS-CoV-2      (9/22/2020)  Not in Time Range          (9/22/2020)       Not Detected (9/22/2020) Not Detected       Lab Results   Component Value Date    COVID19 Not Detected 09/22/2020       Recent Labs     09/23/20 2012 09/25/20  0051   [facility] 9.9* 21.9*       Imaging Studies:   No new imaging    Cultures:     Culture, Blood 1 [[id number]]   Collected: 09/22/20 1848    Order Status: Completed  Specimen: Blood  Updated: 09/25/20 0032     Specimen Description  . BLOOD     Special Requests  RT ARM,6CC     Culture  NO GROWTH 3 DAYS    Culture, Anaerobic and Aerobic [[id number]]  (Abnormal)  Collected: 09/23/20 1937    Order Status: Completed  Specimen: Ascitic Fluid  Updated: 09/24/20 6682     Specimen Description  . ASCITIC FLUID     Special Requests  NOT REPORTED     Direct Exam  NO NEUTROPHILS SEEN      FEW BUDDING YEASTAbnormal       Culture  PRESUMPTIVE CANDIDA ALBICANS LIGHT GROWTHAbnormal       MRSA DNA Probe, Nasal [[id number]]  (Abnormal)  Collected: 09/22/20 2150    Order Status: Completed  Specimen: Nasal  Updated: 09/23/20 1431     Specimen Description  . NASAL SWAB     MRSA, DNA, Nasal  POSITIVE:  MRSA DNA detected by nucleic acid amplification. Abnormal       Comment:                                                     Results should be used as an adjunct to nosocomial control efforts to identify patients   needing enhanced precautions.     The test is not intended to identify patients with staphylococcal infections.  Results   should not be used to guide or monitor treatment for MRSA infections. Medications:      vancomycin  1,500 mg Intravenous Q8H    guaiFENesin  600 mg Oral BID    piperacillin-tazobactam  3.375 g Intravenous Q8H    insulin glargine  12 Units Subcutaneous Daily    [Held by provider] metFORMIN  500 mg Oral BID WC    sodium chloride flush  10 mL Intravenous 2 times per day    [Held by provider] enoxaparin  40 mg Subcutaneous Daily    vancomycin (VANCOCIN) intermittent dosing (placeholder)   Other RX Placeholder    famotidine (PEPCID) injection  20 mg Intravenous 2 times per day    insulin lispro  0-6 Units Subcutaneous TID WC    insulin lispro  0-3 Units Subcutaneous Nightly           [facility]  Lizz Sandoval MD  Perfect [employer] messaging  OFFICE: [phone]      Electronically signed by Lizz Sandoval MD on 9/25/2020 at 9:40 AM  Thank you for allowing us to participate in the care of this patient. Please call with questions.     This note iscreated with the assistance of a speech recognition program.  While intending to generate a document that actually reflects the content of the visit, the document can still have some errors including those of syntax andsound a like substitutions which may escape proof reading. In such instances, actual meaning can be extrapolated by contextual diversion.

## 2020-09-25 NOTE — DISCHARGE INSTR - COC
Continuity of Care Form    Patient Name: Maddi Conner   :  1970  MRN:  [id number]    Admit date:  2020  Discharge date:  20    Code Status Order: Full Code   Advance Directives:   [address] [facility] Documentation       Date/Time Healthcare Directive Type of Healthcare Directive Copy in [address] [facility] [address] Agent's Name Healthcare Agent's Phone Number    20 1802  No, patient does not have an advance directive for healthcare treatment -- -- -- -- --            Admitting Physician:  Chula Jaime MD  PCP: Avi Garcia MD    Discharging Nurse: [facility] Unit/Room#: 1021/1021-01  Discharging Unit Phone Number: [phone]    Emergency Contact:   Extended Emergency Contact Information  Primary Emergency Contact: [address] [facility] Phone: [phone]  Relation: Other   needed? No    Past Surgical History:  Past Surgical History:   Procedure Laterality Date    IR CHOLECYSTOSTOMY PERCUTANEOUS COMPLETE  2020    IR CHOLECYSTOSTOMY PERCUTANEOUS COMPLETE 2020 Ruth Romero MD [facility] PROCEDURES       Immunization History: There is no immunization history on file for this patient.     Active Problems:  Patient Active Problem List   Diagnosis Code    Pneumonia J18.9    Abscess of lower lobe of right lung with pneumonia ([facility] [address] J85.1    Acute cholecystitis K81.0    Multifocal pneumonia J18.9    Obesity due to excess calories E66.09    Diabetes mellitus type 2 in obese ([facility] [address] E11.69, E66.9    At high risk for tuberculosis infection Z91.89       Isolation/Infection:   Isolation            Contact          Patient Infection Status       Infection Onset Added Last Indicated Last Indicated By Review Planned Expiration Resolved Resolved By    COVID-19 Rule Out 20 COVID-19 (Ordered) 09/30/20 10/07/20      MRSA 20 MRSA DNA Probe, Nasal        Resolved    COVID-19 Rule Out 09/22/20 09/22/20 09/22/20 COVID-19 (Ordered)   09/22/20 Rule-Out Test Resulted            Nurse Assessment:  Last Vital Signs: /62   Pulse 81   Temp 98.2 °F (36.8 °C) (Oral)   Resp 20   Ht 5' 9\" (1.753 m)   Wt 200 lb (90.7 kg)   SpO2 95%   BMI 29.53 kg/m²     Last documented pain score (0-10 scale): Pain Level: 7  Last Weight:   Wt Readings from Last 1 Encounters:   09/25/20 200 lb (90.7 kg)     Mental Status:  oriented and alert    IV Access:  - Midline Right Brachial    Nursing Mobility/ADLs:  Walking   Independent  Transfer  Independent  Bathing  Independent  Dressing  Independent  Toileting  Independent  Feeding  Independent  Med Admin  Independent  Med Delivery   whole    Wound Care Documentation and Therapy:        Elimination:  Continence:   · Bowel: Yes  · Bladder: Yes  Urinary Catheter: None   Colostomy/Ileostomy/Ileal Conduit: No       Date of Last BM: 9/25/20    Intake/Output Summary (Last 24 hours) at 9/25/2020 1232  Last data filed at 9/25/2020 0845  Gross per 24 hour   Intake 832.8 ml   Output 100 ml   Net 732.8 ml     I/O last 3 completed shifts: In: 592.8 [IV Piggyback:592.8]  Out: 100 [Drains:100]    Safety Concerns:     None    Impairments/Disabilities:      None    Nutrition Therapy:  Current Nutrition Therapy:   - Oral Diet:  General    Routes of Feeding: Oral  Liquids: No Restrictions  Daily Fluid Restriction: no  Last Modified Barium Swallow with Video (Video Swallowing Test): not done    Treatments at the Time of Hospital Discharge:   Respiratory Treatments: na   Oxygen Therapy:  is not on home oxygen therapy. Ventilator:    - No ventilator support    Rehab Therapies:    Weight Bearing Status/Restrictions: No weight bearing restirctions  Other Medical Equipment (for information only, NOT a DME order): Other Treatments:   1. Skilled RN assessment  2. Medication reconciliation   3.  IV ATB TEFLARO bid (Teflaro and oral fluconazole through October 13, 2020)     Patient's

## 2020-09-25 NOTE — PROGRESS NOTES
[facility]  Office: [address], DO, Annetta Lyndon, DO, Yadiragolden Saravia, DO, [place] Nikita Edward, DO, Winter Leal MD, Mary Juárez MD, Selvin Proctor MD, Lili Man MD, Afshin Castro MD, Javier Schwarz MD, Mirna Mejia MD, Ernesto Smith MD, João Farmer MD, Beckie Harris, DO, Gianfranco Judd MD, David Bangura MD, Daisy Agustin DO, Phani Zimmerman MD,  Solomon Arias, DO, Blanco Li MD, Talisha Au MD, Caleb Cochran, [facility], [facility] Sandra, CNP, Daisy Macias, CNP, Kulwant Posada, CNS, Mary Camacho, CNP, Joseph Ac, CNP, Alicja Navarrete, CNP, Hemanth Juarez, CNP, Joe Tamayo, CNP, Pancho Butcher PA-C, Jazmin Amaya, [place], Christina Brito, CNP, Agustín Acosta, CNP, Aspen Burgos, CNP, Marita Antonio, CNP, Kenia Bess, [facility]   [address] [facility]    Progress Note    9/25/2020    4:52 PM    Name:   Myranda Dick  MRN:     [id number]     Acct:      [de-identified]   Room:   [facility] Day:  3  Admit Date:  9/22/2020  5:46 PM    PCP:   Charla Monk MD  Code Status:  Full Code    Subjective:     C/C: No chief complaint on file. SOB/COUGH  Interval History Status: not changed. Seen and examined face-to-face this morning  On IV abx  Cholecystostomy tube in place       Brief History:     41-year-old gentleman with underlying history of diabetes, morbid obesity, cough with pneumonia in July, recovered, started having subsequent cough and has been seen by   3 weeks back and sent home, but then came back with the right upper quadrant abdominal pain and shortness of breath was admitted to [facility] for lung abscess and acute cholecystitis. Transferred here today for further care.   General surgery, infectious disease, pulmonary consulted,  Patient sitting in the bed when I saw, still coughing, still having right upper quadrant abdominal pain,  Pulmonary, general surgery, infectious disease, CT surgery (24Hr): Intake/Output Summary (Last 24 hours) at 9/25/2020 1652  Last data filed at 9/25/2020 0845  Gross per 24 hour   Intake 832.8 ml   Output 70 ml   Net 762.8 ml       Labs:  Hematology:  Recent Labs     09/23/20  0522 09/24/20  0529 09/25/20  0550   WBC 8.2 7.0 8.5   RBC 3.77* 3.88* 4.23   HGB 11.7* 11.9* 12.9*   HCT 36.2* 37.5* 41.1   MCV 96.0 96.6 97.2   MCH 31.0 30.7 30.5   MCHC 32.3 31.7 31.4   RDW 13.3 13.3 13.5    276 340   MPV 9.7 9.7 9.2   INR 1.3  --   --      Chemistry:  Recent Labs     09/23/20  0522 09/24/20  0529 09/25/20  0550    135 138   K 3.8 3.5* 4.1    99 102   CO2 24 26 25   GLUCOSE 205* 231* 175*   BUN 6 2* 4*   CREATININE 0.49* 0.45* 1.04   ANIONGAP 9 10 11   LABGLOM >60 >60 >60   GFRAA >60 >60 >60   CALCIUM 8.3* 8.9 9.4     Recent Labs     09/23/20  0522  09/24/20  0718 09/24/20  1120 09/24/20  1643 09/24/20  2037 09/25/20  0720 09/25/20  1217   LABA1C 13.3*  --   --   --   --   --   --   --    POCGLU  --    < > 184* 190* 221* 286* 167* 245*    < > = values in this interval not displayed. ABG:No results found for: POCPH, PHART, PH, POCPCO2, EHB8DQI, PCO2, POCPO2, PO2ART, PO2, POCHCO3, DXQ1IJV, HCO3, NBEA, PBEA, BEART, BE, THGBART, THB, CDR5XNI, EWDB1BEV, C2EODDVD, O2SAT, FIO2  Lab Results   Component Value Date/Time    SPECIAL NOT REPORTED 09/23/2020 07:37 PM     Lab Results   Component Value Date/Time    CULTURE PRESUMPTIVE CANDIDA ALBICANS LIGHT GROWTH (A) 09/23/2020 07:37 PM       Radiology:  Prema Mays Chest (single View Frontal)    Result Date: 9/23/2020  Focal airspace consolidation right lung base. No evidence of cavitation on this single frontal view. Ct Chest Wo Contrast    Result Date: 9/23/2020  1. Posterior right lower lobe 3.5 x 2.8 x 4 cm fluid and gas filled collection. The morphology favors an intraparenchymal lung abscess rather than empyema. 2. Areas of ground-glass opacity and reticulation compatible with history of COVID-19 pneumonia.  3. Enlarged subcarinal lymph node could be reactive. 4. Trace pericholecystic fluid is partially imaged. Findings correspond with the recent HIDA scan. Xr Chest Portable    Result Date: 9/23/2020  Persistent moderate patchy right basilar infiltrate, pneumonia the likely etiology, follow changes to resolution/sign report       Physical Examination:        General appearance:  alert, cooperative and no distress  Mental Status:  oriented to person, place and time and normal affect  Lungs:  clear to auscultation bilaterally, normal effort  Heart:  regular rate and rhythm, no murmur  Abdomen:  soft, nontender, nondistended, normal bowel sounds, no masses, hepatomegaly, splenomegaly  Extremities:  no edema, redness, tenderness in the calves  Skin:  no gross lesions, rashes, induration    Assessment:        Hospital Problems           Last Modified POA    * (Principal) Abscess of lower lobe of right lung with pneumonia ([facility] [address] 9/22/2020 Yes    Pneumonia 9/22/2020 Yes    Acute cholecystitis 9/22/2020 Yes    Multifocal pneumonia 9/22/2020 Yes    Obesity due to excess calories 9/22/2020 Yes    Diabetes mellitus type 2 in obese ([address] 9/22/2020 Yes    At high risk for tuberculosis infection 9/23/2020 Yes          Plan:        1. Post COVID lung abscess of the right lower lobe, was admitted to [facility], seen by pulmonary and infectious disease, on vancomycin and Zosyn as per infectious disease, pulmonary and CT surgery on board,   2. Acute cholecystitis, status post cholecystostomy tube placed on September 23, 2020  3. MRSA multifocal pneumonia, broad-spectrum antibiotic,  4. Patient high risk for tuberculosis and HIV, testing pending, in isolation  5. Pain control,  6. Diabetes mellitus type 2, continue to monitor sugars, insulin sliding scale, will hold metformin,  7. DVT and GI prophylaxis,  8.  Full CODE STATUS       Lilliana Healy MD  9/25/2020  4:52 PM

## 2020-09-25 NOTE — PROGRESS NOTES
Pulmonary Critical Care Progress Note  FLORENCE Mckenna-JESICA/Ambika Thomas MD     Patient seen for the follow up of  Abscess of lower lobe of right lung with pneumonia ([address]     Subjective:  He is sitting up in bed, in no distress. No significant overnight events noted. He denies any shortness of breath or chest pain. He does continue to have a productive cough with tan/brown and occasionally pink-tinged sputum. His right upper quadrant abdominal pain is resolved after having cholecystostomy tube placed yesterday, he has mild discomfort at tube insertion site. Examination:  Vitals: /68   Pulse 72   Temp 98.4 °F (36.9 °C) (Oral)   Resp 18   Ht 5' 9\" (1.753 m)   Wt 200 lb (90.7 kg)   SpO2 96%   BMI 29.53 kg/m²   General appearance: alert and cooperative with exam, sitting up in bed  Neck: No JVD  Lungs: Moderate air exchange, diminished right base, no wheezing, rales or rhonchi  Heart: regular rate and rhythm, S1, S2 normal, no gallop  Abdomen: Soft, non tender, + BS  Extremities: no cyanosis or clubbing.  No significant edema    LABs:  CBC:   Recent Labs     09/24/20  0529 09/25/20  0550   WBC 7.0 8.5   HGB 11.9* 12.9*   HCT 37.5* 41.1    340     BMP:   Recent Labs     09/24/20  0529 09/25/20  0550    138   K 3.5* 4.1   CO2 26 25   BUN 2* 4*   CREATININE 0.45* 1.04   LABGLOM >60 >60   GLUCOSE 231* 175*     PT/INR:   Recent Labs     09/23/20  0522   PROTIME 15.6*   INR 1.3     APTT:  Recent Labs     09/23/20  0522   APTT 41.0*     Radiology:  9/25/20      Impression:  · Right lower lobe pneumonia versus early abscess, 3.5 x 2.8 x 4 cm fluid and gas filled   collection  · Bilateral groundglass infiltrates with recent history of COVID pneumonia in July, significantly improving  · Suspected obstructive sleep apnea/Obesity  · Lymphadenopathy, likely reactive  · Acute cholecystitis s/p percutaneous cholecystostomy tube placement 9/23/2020  · DM type II, Uncontrolled    Recommendations:  · Continue IV antibiotics, Zosyn and Vanco, ID following  · Oxygen via nasal cannula if necessary  · Albuterol HFA Q 4 hours prn  · Incentive spirometry every hour while awake  · Blood sugar control per primary team  · CT surgery with no plans for surgical intervention at this time. · We will proceed with course of antibiotics for 3 to 4 weeks and follow-up with repeat CT chest in 4 to 6 weeks to follow-up on resolution. · General surgery following  · Blood cultures with no growth x3 days  · PT/OT  · DVT prophylaxis with low molecular weight heparin  · Sleep study as an outpatient  · Patient will need repeat CT chest in 4 to 6 weeks as an outpatient  · Discussed with RN  · Discussed with Dr. Bill Montilla   · Discharge planning when arrangements made for home antibiotic therapy  · Will follow with you      FLORENCE Jett-CNP   Pulmonary Critical Care and Sleep Medicine,  Patient seen under the supervision of Ami Whitfield MD, [facility]    Patient seen and evaluated by me. He is resting in bed in no distress. He denies any shortness of breath or chest pain. His productive cough is getting better. Lung exam reveals diminished breath sounds right base, otherwise clear with good air exchange. Plan is to continue antibiotics per ID. He is encouraged for incentive spirometry every hour while awake. He is ok to discharge home from pulmonary standpoint with outpatient follow up in 1-2 weeks with Sachin Elizabeth CNP. He will need repeat CT chest in 4-6 weeks. Sleep studies as an outpatient. Above was reviewed and discussed with Sachin Elizabeth CNP. And I agree with assessment and plan of care.   Electronically signed by     Isrrael Awan MD on 9/25/2020 at 2:40 PM  Pulmonary Critical Care and Sleep Medicine,  [facility]  Cell: [phone]  Office: [phone]

## 2020-09-25 NOTE — PLAN OF CARE
Problem: Pain:  Goal: Pain level will decrease  Description: Pain level will decrease  Outcome: Ongoing  Note: Monitoring pain with each assessment and prn. MABLE 0-10 pain scale utilized. Non-pharmacological measures to be encouraged prior to pharmacological measures. Goal: Control of acute pain  Description: Control of acute pain  Outcome: Ongoing  Goal: Control of chronic pain  Description: Control of chronic pain  Outcome: Ongoing     Problem: Nutritional:  Goal: Nutritional status will improve  Description: Nutritional status will improve  Outcome: Ongoing  Note: Review diet daily and as needed with pt. Provide supplement nutrition as ordered by physician & educate pt. Review nutritional guidelines with pt. Problem: Physical Regulation:  Goal: Diagnostic test results will improve  Description: Diagnostic test results will improve  Outcome: Ongoing  Note: Assessed temperature for fever or for hypothermia signs or symptoms. Implemented protocol for either fever or hypothermia. Goal: Will remain free from infection  Description: Will remain free from infection  Outcome: Ongoing  Note: Monitor for signs & symptoms of infection. Utilize standard precautions & proper handwashing. Communicate referral to infection control. Goal: Ability to maintain vital signs within normal range will improve  Description: Ability to maintain vital signs within normal range will improve  Outcome: Ongoing  Note: Monitor vital signs at least every shift & as needed. Monitor intake & output at least every shift. Problem: Respiratory:  Goal: Ability to maintain normal respiratory secretions will improve  Description: Ability to maintain normal respiratory secretions will improve  Outcome: Ongoing  Note: Assess for adventitious breath sounds. Monitored SaO2 > 90%. Applied 02 per nasal cannula as needed. Elevated HOB to improve breathing as needed.         Problem: Skin Integrity:  Goal: Demonstration of wound healing without infection will improve  Description: Demonstration of wound healing without infection will improve  Outcome: Ongoing  Note: Assess wound for signs of healing, size, appearance, & drainage. Assess pulses & for pain. Goal: Complications related to intravenous access or infusion will be avoided or minimized  Description: Complications related to intravenous access or infusion will be avoided or minimized  Outcome: Ongoing     Problem: Falls - Risk of:  Goal: Will remain free from falls  Description: Will remain free from falls  Outcome: Ongoing  Note: Pt fall risk, fall band present, falling star, safety alarm activated and in use as needed. Hourly rounding performed. Pt encouraged to use call light. See Joe Patton fall risk assessment. Goal: Absence of physical injury  Description: Absence of physical injury  Outcome: Ongoing  Note: Non-skid socks in place, up with assistance, bed in lowest position, bed exit & alarm as needed, provide toileting every 2 hours an d as needed.

## 2020-09-25 NOTE — CONSULTS
Pharmacy Note  Vancomycin Consult - Follow Up     Vancomycin Therapy Day: 4  Current Dosinmg q8hr  Current diagnosis for which MRSA is suspected/confirmed: pneumonia  ONLY for suspected pneumonia or COPD: MRSA nasal swab   showed positive result on 20 . Temp: 98.6 F    Actual Weight:   Wt Readings from Last 1 Encounters:   20 220 lb (99.8 kg)       Recent Labs     20  0522 20  0529   CREATININE 0.49* 0.45*     Calculate CrCl based on IBW: >100 ml/min    Recent Labs     20  0522 20  0529   WBC 8.2 7.0         Intake/Output Summary (Last 24 hours) at 2020 0130  Last data filed at 2020 1246  Gross per 24 hour   Intake 850 ml   Output 170 ml   Net 680 ml           ASSESSMENT/PLAN    Vancomycin trough level was 21.9 mcg/ml, supratherapeutic. Will hold a dose and then, restart at lower dose, 1500mg q8hr and recheck trough level at steady state. Thank you for the consult. Will Continue to follow.   Miguel Randall [place]  2020  1:30 AM

## 2020-09-26 LAB
ANION GAP SERPL CALCULATED.3IONS-SCNC: 10 MMOL/L (ref 9–17)
BUN BLDV-MCNC: 9 MG/DL (ref 6–20)
BUN/CREAT BLD: 8 (ref 9–20)
CALCIUM SERPL-MCNC: 9.6 MG/DL (ref 8.6–10.4)
CHLORIDE BLD-SCNC: 101 MMOL/L (ref 98–107)
CO2: 26 MMOL/L (ref 20–31)
CREAT SERPL-MCNC: 1.17 MG/DL (ref 0.7–1.2)
GFR AFRICAN AMERICAN: >60 ML/MIN
GFR NON-AFRICAN AMERICAN: >60 ML/MIN
GFR SERPL CREATININE-BSD FRML MDRD: ABNORMAL ML/MIN/{1.73_M2}
GFR SERPL CREATININE-BSD FRML MDRD: ABNORMAL ML/MIN/{1.73_M2}
GLUCOSE BLD-MCNC: 220 MG/DL (ref 75–110)
GLUCOSE BLD-MCNC: 222 MG/DL (ref 70–99)
HCT VFR BLD CALC: 39.3 % (ref 40.7–50.3)
HEMOGLOBIN: 12.2 G/DL (ref 13–17)
MCH RBC QN AUTO: 30.5 PG (ref 25.2–33.5)
MCHC RBC AUTO-ENTMCNC: 31 G/DL (ref 28.4–34.8)
MCV RBC AUTO: 98.3 FL (ref 82.6–102.9)
NRBC AUTOMATED: 0 PER 100 WBC
PDW BLD-RTO: 13.2 % (ref 11.8–14.4)
PLATELET # BLD: 319 K/UL (ref 138–453)
PMV BLD AUTO: 9.3 FL (ref 8.1–13.5)
POTASSIUM SERPL-SCNC: 4 MMOL/L (ref 3.7–5.3)
RBC # BLD: 4 M/UL (ref 4.21–5.77)
SODIUM BLD-SCNC: 137 MMOL/L (ref 135–144)
WBC # BLD: 9.8 K/UL (ref 3.5–11.3)

## 2020-09-26 PROCEDURE — 85027 COMPLETE CBC AUTOMATED: CPT

## 2020-09-26 PROCEDURE — 99231 SBSQ HOSP IP/OBS SF/LOW 25: CPT | Performed by: INTERNAL MEDICINE

## 2020-09-26 PROCEDURE — 6370000000 HC RX 637 (ALT 250 FOR IP): Performed by: INTERNAL MEDICINE

## 2020-09-26 PROCEDURE — 6370000000 HC RX 637 (ALT 250 FOR IP): Performed by: NURSE PRACTITIONER

## 2020-09-26 PROCEDURE — 2500000003 HC RX 250 WO HCPCS: Performed by: SURGERY

## 2020-09-26 PROCEDURE — 80048 BASIC METABOLIC PNL TOTAL CA: CPT

## 2020-09-26 PROCEDURE — 2580000003 HC RX 258: Performed by: INTERNAL MEDICINE

## 2020-09-26 PROCEDURE — 82947 ASSAY GLUCOSE BLOOD QUANT: CPT

## 2020-09-26 PROCEDURE — 99239 HOSP IP/OBS DSCHRG MGMT >30: CPT | Performed by: INTERNAL MEDICINE

## 2020-09-26 PROCEDURE — 36415 COLL VENOUS BLD VENIPUNCTURE: CPT

## 2020-09-26 PROCEDURE — 6360000002 HC RX W HCPCS: Performed by: INTERNAL MEDICINE

## 2020-09-26 RX ORDER — HYDROCODONE BITARTRATE AND ACETAMINOPHEN 10; 325 MG/1; MG/1
1 TABLET ORAL EVERY 4 HOURS PRN
Qty: 24 TABLET | Refills: 0 | Status: SHIPPED | OUTPATIENT
Start: 2020-09-26 | End: 2020-10-03

## 2020-09-26 RX ADMIN — INSULIN LISPRO 2 UNITS: 100 INJECTION, SOLUTION INTRAVENOUS; SUBCUTANEOUS at 09:15

## 2020-09-26 RX ADMIN — CEFTAROLINE FOSAMIL 600 MG: 600 POWDER, FOR SOLUTION INTRAVENOUS at 05:50

## 2020-09-26 RX ADMIN — FAMOTIDINE 20 MG: 10 INJECTION INTRAVENOUS at 09:15

## 2020-09-26 RX ADMIN — FLUCONAZOLE 200 MG: 100 TABLET ORAL at 09:15

## 2020-09-26 RX ADMIN — GUAIFENESIN 600 MG: 600 TABLET, EXTENDED RELEASE ORAL at 09:15

## 2020-09-26 RX ADMIN — INSULIN GLARGINE 12 UNITS: 100 INJECTION, SOLUTION SUBCUTANEOUS at 09:15

## 2020-09-26 ASSESSMENT — ENCOUNTER SYMPTOMS
ALLERGIC/IMMUNOLOGIC NEGATIVE: 1
RESPIRATORY NEGATIVE: 1
GASTROINTESTINAL NEGATIVE: 1

## 2020-09-26 NOTE — PROGRESS NOTES
.  The patient reports having 2 bouts of emesis at home without any blood and mostly watery but never had any diarrhea. He never had any associated fevers, chills or sweats. Again his breathing had been improving and though he had a persistent cough this was there since his COVID infection. The patient was also incidentally noted to have a lung lesion raising concern for an abscess on the CT scan.      The patient was transferred here from [facility] due to insurance issues and l was asked to evaluate for the pneumonia/abscess. Current evaluation:2020    /65   Pulse 94   Temp 98.6 °F (37 °C) (Oral)   Resp 18   Ht 5' 9\" (1.753 m)   Wt 200 lb (90.7 kg)   SpO2 96%   BMI 29.53 kg/m²     Temperature Range: Temp: 98.6 °F (37 °C) Temp  Av.4 °F (36.9 °C)  Min: 97.9 °F (36.6 °C)  Max: 99 °F (37.2 °C)   The patient is seen and evaluated at bedside he is awake and alert in no acute distress  He does still have some discomfort in the right upper quadrant where the drain is in place. No subjective fever or chills. No abdominal pain nausea or vomiting. Review of Systems   Constitutional: Negative. Respiratory: Negative. Cardiovascular: Negative. Gastrointestinal: Negative. Genitourinary: Negative. Musculoskeletal: Negative. Allergic/Immunologic: Negative. Neurological: Negative. Physical Examination :     Physical Exam  Constitutional:       Appearance: He is well-developed. HENT:      Head: Normocephalic and atraumatic. Neck:      Musculoskeletal: Normal range of motion and neck supple. Cardiovascular:      Rate and Rhythm: Normal rate. Heart sounds: Normal heart sounds. No friction rub. No gallop. Pulmonary:      Effort: Pulmonary effort is normal.      Breath sounds: Normal breath sounds. No wheezing. Abdominal:      General: Bowel sounds are normal.      Palpations: Abdomen is soft. There is no mass. Tenderness:  There is no abdominal tenderness. Comments: Percutaneous drain in place   Musculoskeletal: Normal range of motion. Lymphadenopathy:      Cervical: No cervical adenopathy. Skin:     General: Skin is warm and dry. Neurological:      Mental Status: He is alert and oriented to person, place, and time. Laboratory data:   I have independently reviewed the followinglabs:  CBC with Differential:   Recent Labs     09/24/20  0529 09/25/20  0550 09/26/20  0602   WBC 7.0 8.5 9.8   HGB 11.9* 12.9* 12.2*   HCT 37.5* 41.1 39.3*    340 319   LYMPHOPCT 40 41  --    MONOPCT 7 7  --      BMP:   Recent Labs     09/25/20  0550 09/26/20  0602    137   K 4.1 4.0    101   CO2 25 26   BUN 4* 9   CREATININE 1.04 1.17     Hepatic Function Panel: No results for input(s): PROT, LABALBU, BILIDIR, IBILI, BILITOT, ALKPHOS, ALT, AST in the last 72 hours. No results found for: PROCAL  No results found for: CRP  No results found for: SEDRATE      No results found for: DDIMER  No results found for: FERRITIN  No results found for: LDH  No results found for: FIBRINOGEN    Results in Past 30 Days  Result Component Current Result Ref Range Previous Result Ref Range   SARS-CoV-2      (9/22/2020)  Not in Time Range          (9/22/2020)       Not Detected (9/22/2020) Not Detected       Lab Results   Component Value Date    COVID19 Not Detected 09/22/2020       Recent Labs     09/23/20 2012 09/25/20  0051   [facility] 9.9* 21.9*       Imaging Studies:   No new imaging    Cultures:     Culture, Blood 1 [[id number]]   Collected: 09/22/20 1848    Order Status: Completed  Specimen: Blood  Updated: 09/25/20 0032     Specimen Description  . BLOOD     Special Requests  RT ARM,6CC     Culture  NO GROWTH 3 DAYS    Culture, Anaerobic and Aerobic [[id number]]  (Abnormal)  Collected: 09/23/20 1937    Order Status: Completed  Specimen: Ascitic Fluid  Updated: 09/24/20 1852     Specimen Description  . ASCITIC FLUID     Special Requests  NOT REPORTED Direct Exam  NO NEUTROPHILS SEEN      FEW BUDDING YEASTAbnormal       Culture  PRESUMPTIVE CANDIDA ALBICANS LIGHT GROWTHAbnormal       MRSA DNA Probe, Nasal [[id number]]  (Abnormal)  Collected: 09/22/20 2150    Order Status: Completed  Specimen: Nasal  Updated: 09/23/20 1431     Specimen Description  . NASAL SWAB     MRSA, DNA, Nasal  POSITIVE:  MRSA DNA detected by nucleic acid amplification. Abnormal       Comment:                                                     Results should be used as an adjunct to nosocomial control efforts to identify patients   needing enhanced precautions.     The test is not intended to identify patients with staphylococcal infections.  Results   should not be used to guide or monitor treatment for MRSA infections. Medications:      fluconazole  200 mg Oral Daily    ceftaroline fosamil (TEFLARO) IVPB  600 mg Intravenous Q12H    guaiFENesin  600 mg Oral BID    insulin glargine  12 Units Subcutaneous Daily    [Held by provider] metFORMIN  500 mg Oral BID WC    sodium chloride flush  10 mL Intravenous 2 times per day    [Held by provider] enoxaparin  40 mg Subcutaneous Daily    famotidine (PEPCID) injection  20 mg Intravenous 2 times per day    insulin lispro  0-6 Units Subcutaneous TID WC    insulin lispro  0-3 Units Subcutaneous Nightly           [facility]  Aracelis Angulo MD  Perfect [employer] messaging  OFFICE: [phone]      Electronically signed by Aracelis Angulo MD on 9/26/2020 at 10:48 AM  Thank you for allowing us to participate in the care of this patient. Please call with questions. This note iscreated with the assistance of a speech recognition program.  While intending to generate a document that actually reflects the content of the visit, the document can still have some errors including those of syntax andsound a like substitutions which may escape proof reading.   In such instances, actual meaning can be extrapolated by contextual diversion.

## 2020-09-26 NOTE — PROGRESS NOTES
Discharge information given and explained to pt. Pt verbalized understanding. Pt discharged home with all documented belongings. All follow up appointment information discussed with patient, and included in packet.

## 2020-09-26 NOTE — PROGRESS NOTES
09/26/2020    GLUCOSE 222 09/26/2020     Radiology Review:      ASSESSMENT:  Active Hospital Problems    Diagnosis Date Noted    At high risk for tuberculosis infection [Z91.89] 09/23/2020    Pneumonia [J18.9] 09/22/2020    Abscess of lower lobe of right lung with pneumonia ([facility] [J85.1] 09/22/2020    Acute cholecystitis [K81.0] 09/22/2020    Multifocal pneumonia [J18.9] 09/22/2020    Obesity due to excess calories [E66.09] 09/22/2020    [facility] 2 [facility] [E11.69, E66.9] 09/22/2020       1. Right lower lobe pneumonia with lung abscess  2. Acute cholecystitis status post cholecystostomy tube    Plan:  1. Diet: Regular diet. Patient is tolerating. 2. Antibiotics per infectious disease  3. Surgery standpoint-okay for discharge. Will be discharged with cholecystostomy tube follow-up with Dr. Arelis Vaca in 4-6 weeks to discuss elective cholecystectomy.   4. Medical management per primary team      Electronically signed by Tobias Khan DO  on 9/26/2020 at 6:57 AM

## 2020-09-26 NOTE — PROGRESS NOTES
Pulmonary Critical Care Progress Note  FLORENCE Covarrubias-JESICA/Ambika Thomas MD     Patient seen for the follow up of  Abscess of lower lobe of right lung with pneumonia ([address]     Subjective:  He is sitting up at the edge of the bed. No significant overnight events noted. He denies any shortness of breath or chest pain. His cough is slowly getting better. He denies any abdominal pain and is tolerating regular diet. Examination:  Vitals: /65   Pulse 94   Temp 98.6 °F (37 °C) (Oral)   Resp 18   Ht 5' 9\" (1.753 m)   Wt 200 lb (90.7 kg)   SpO2 96%   BMI 29.53 kg/m²   General appearance: alert and cooperative with exam, sitting up in bed  Neck: No JVD  Lungs: Moderate air exchange, diminished right base, no wheezing, rales or rhonchi  Heart: regular rate and rhythm, S1, S2 normal, no gallop  Abdomen: Soft, non tender, + BS  Extremities: no cyanosis or clubbing.  No significant edema    LABs:  CBC:   Recent Labs     09/25/20  0550 09/26/20  0602   WBC 8.5 9.8   HGB 12.9* 12.2*   HCT 41.1 39.3*    319     BMP:   Recent Labs     09/25/20  0550 09/26/20  0602    137   K 4.1 4.0   CO2 25 26   BUN 4* 9   CREATININE 1.04 1.17   LABGLOM >60 >60   GLUCOSE 175* 222*     Radiology:  9/25/20      Impression:  · Right lower lobe pneumonia versus early abscess, 3.5 x 2.8 x 4 cm fluid and gas filled   collection  · Bilateral groundglass infiltrates with recent history of COVID pneumonia in July, significantly improving  · Suspected obstructive sleep apnea/Obesity  · Lymphadenopathy, likely reactive  · Acute cholecystitis s/p percutaneous cholecystostomy tube placement 9/23/2020  · DM type II, Uncontrolled    Recommendations:  · Continue IV antibiotics, Zosyn and Vanco, ID following  · Oxygen via nasal cannula if necessary  · Albuterol HFA Q 4 hours prn  · Incentive spirometry every hour while awake  · Blood sugar control per primary team  · CT surgery with no plans for surgical intervention at this

## 2020-09-27 NOTE — DISCHARGE SUMMARY
[facility]  Office: [address], DO, Elvis Tesfaye, DO, Christina Martín, DO, Miguelito David Edward, DO, Martine Durham MD, Juan Miguel East MD, James Price MD, Charmaine Fontaine MD, Diana Montanez MD, Mark Chen MD, Asya Castro MD, Manan Wei MD, João Vick MD, Luna Michaels, DO, Merna Vogel MD, Juani Mendes MD, Wicho Styles, DO, Diana Jones MD,  Reema Mooney DO, Thalia Yañez MD, Fox Mir MD, Rosy Marie, [facility], [facility], CNP, Cece Casiano, CNP, Brett Mcbride, CNS, Dang Reyes, CNP, Izabela Tracy, CNP, Henri Chester, CNP, Garett Calloway, CNP, Bubba Kennedy, CNP, Helio Carias PA-C, Sabina Proctor, [facility], Yasmin Catherine, CNP, Krystal Paris, CNP, Kelby Chamorro, CNP, Rosaline Zhong, CNP, Lisbeth Black, [facility]    Discharge Summary     Patient ID: Kristi Stevens  :  1970   MRN: [id number]     ACCOUNT:  [de-identified]   Patient's PCP: Roseanne Carmona MD  Admit Date: 2020   Discharge Date: 20    Length of Stay: 4  Code Status:  Prior  Admitting Physician: Juani Mendes MD  Discharge Physician: Juani Mendes MD     Active Discharge Diagnoses:     Hospital Problem Lists:  Principal Problem:    Abscess of lower lobe of right lung with pneumonia [facility])  Active Problems:    Pneumonia    Acute cholecystitis    Multifocal pneumonia    Obesity due to excess calories    Diabetes mellitus type 2 in obese ([facility]    At high risk for tuberculosis infection  Resolved Problems:    * No resolved hospital problems. *      Admission Condition:  critical     Discharged Condition: stable    Hospital Stay:     Hospital Course:  Kristi Stevens is a 45 y.o. male who was admitted for the management of   Abscess of lower lobe of right lung with pneumonia [facility]) , presented to ER with No chief complaint on file.   59-year-old gentleman with underlying history of diabetes, morbid obesity, cough with pneumonia in July, recovered, started having subsequent cough and has been seen by Dr. Kary Owen weeks back and sent home, but then came back with the right upper quadrant abdominal pain and shortness of breath was admitted to [facility] for lung abscess and acute cholecystitis.  Transferred here today for further care. General surgery, infectious disease, pulmonary consulted,  Patient had cholecystostomy tube for cholecystitis,  Cardiothoracic surgery did not plan to do decortication or place a chest tube, only want to go with antibiotics,  Infectious disease and pulmonary help with that,  Patient going home with home health care and IV antibiotic infusion with Teflaro      Review of system:  Denies any nausea vomiting fever chills,  Denies any headaches or blurred vision,  Denies any chest pain shortness of pain orthopnea,   Baseline SOB,  Denies any abdominal pain diarrhea constipation,  Denies any tingling tingling numbness weakness of arms or legs,   Skin no rash,    On examination,  Alert awake oriented x3,  S1-S2 present,  B/l coarse BS, basal crackles   Abdomen soft nontender nondistended bowel sounds present   Extremity no edema no calf tenderness,,  Skin no rash  CNS no focal neurological deficits      Significant therapeutic interventions:   1. Post COVID lung abscess of the right lower lobe, was admitted to [facility], seen by pulmonary and infectious disease, on vancomycin and Zosyn as per infectious disease, pulmonary and CT surgery on board,   2. Acute cholecystitis, status post cholecystostomy tube placed on September 23, 2020  3. MRSA multifocal pneumonia, broad-spectrum antibiotic,  4. Patient high risk for tuberculosis and HIV, testing all neg  5. Pain control,  6. Diabetes mellitus type 2, continue to monitor sugars, insulin sliding scale, will hold metformin,  7. DVT and GI prophylaxis,  8.  Full CODE STATUS  Significant Diagnostic Studies:   Labs / Micro:  CBC:   Lab reactive. 4. Trace pericholecystic fluid is partially imaged. Findings correspond with the recent HIDA scan. Xr Chest Portable    Result Date: 9/25/2020  Multifocal airspace opacities, most pronounced within the right lower lobe suggesting an atypical bronchopneumonia. Xr Chest Portable    Result Date: 9/24/2020  Stable moderate patchy right basilar infiltrate, pneumonia the likely etiology, follow changes to resolution Stable mild streaky bilateral mid lung atelectasis     Xr Chest Portable    Result Date: 9/23/2020  Persistent moderate patchy right basilar infiltrate, pneumonia the likely etiology, follow changes to resolution/sign report     Ir Cholecystostomy Percutaneous Complete    Result Date: 9/23/2020  Successful transhepatic placement of an 8 [place] cholecystostomy tube. Consultations:    Consults:     Final Specialist Recommendations/Findings:   IP CONSULT TO PULMONOLOGY  IP CONSULT TO GENERAL SURGERY  IP CONSULT TO INFECTIOUS DISEASES  IP CONSULT TO CARDIOTHORACIC SURGERY      The patient was seen and examined on day of discharge and this discharge summary is in conjunction with any daily progress note from day of discharge.     Discharge plan:     Disposition: Home with [place] [address]     Physician Follow Up:     Paolo Marshall DO  [address] [place] [address]  [phone]    Schedule an appointment as soon as possible for a visit in 4 weeks  for hospital follow-up    FLORENCE Berger - CNP  [address] [facility]  [facility]  [phone]    Schedule an appointment as soon as possible for a visit in 2 weeks  hospital follow up    Antonino Dumont MD  [address] [place] [address], [place] [address]  [phone]    Schedule an appointment as soon as possible for a visit in 1 month  hospital follow up    Wil Marroquin [facility] [address] [place] [address] BUFFY Oliveira    In 3 weeks  For wound re-check       Requiring Further Evaluation/Follow Up POST HOSPITALIZATION/Incidental Findings:     Diet: diabetic diet     Activity: As tolerated    Instructions to Patient:     Discharge Medications:      Medication List      START taking these medications    ceftaroline fosamil 600 MG Solr  Commonly known as:  TEFLARO  Infuse 600 mg intravenously every 12 hours for 21 days     fluconazole 200 MG tablet  Commonly known as:  DIFLUCAN  Take 1 tablet by mouth daily for 7 days     HYDROcodone-acetaminophen  MG per tablet  Commonly known as:  NORCO  Take 1 tablet by mouth every 4 hours as needed for Pain for up to 7 days. CHANGE how you take these medications    ibuprofen 800 MG tablet  Commonly known as:  ADVIL;MOTRIN  What changed:  Another medication with the same name was removed. Continue taking this medication, and follow the directions you see here. CONTINUE taking these medications    insulin glargine 100 UNIT/ML injection vial  Commonly known as:  LANTUS     insulin lispro 100 UNIT/ML injection vial  Commonly known as:  HUMALOG     metFORMIN 500 MG tablet  Commonly known as:  GLUCOPHAGE     Ventolin  (90 Base) MCG/ACT inhaler  Generic drug:  albuterol sulfate HFA           Where to Get Your Medications      These medications were sent to [address] [place] [id number] [facility] [phone] - [phone]  [address] [place] [address], [address] [place] [id number]    Phone:  [phone]   · fluconazole 200 MG tablet     You can get these medications from any pharmacy    Bring a paper prescription for each of these medications  · ceftaroline fosamil 600 MG Solr  · HYDROcodone-acetaminophen  MG per tablet         No discharge procedures on file. Time Spent on discharge is  40 mins in patient examination, evaluation, counseling as well as medication reconciliation, prescriptions for required medications, discharge plan and follow up.     Electronically signed by   Merlene Roblero MD  9/27/2020  12:06 PM      Thank you Dr. Zaida Thomas MD for the opportunity to be involved in this patient's care.

## 2020-09-28 ENCOUNTER — CARE COORDINATION (OUTPATIENT)
Dept: CASE MANAGEMENT | Age: 50
End: 2020-09-28

## 2020-09-28 LAB
CULTURE: ABNORMAL
CULTURE: ABNORMAL
CULTURE: NORMAL
DIRECT EXAM: ABNORMAL
DIRECT EXAM: ABNORMAL
Lab: ABNORMAL
Lab: NORMAL
SPECIMEN DESCRIPTION: ABNORMAL
SPECIMEN DESCRIPTION: NORMAL

## 2020-09-28 NOTE — CARE COORDINATION
[facility] Transitions Initial Follow Up Call- 1st attempt COVID risk follow up call        Call within 2 business days of discharge: Yes    Patient: Mallorie Smith Patient : 1970   MRN: [id number]  Reason for Admission: RLL pneumonia. abscess  Discharge Date: 20 RARS: Readmission Risk Score: 6      Last Discharge [address]       Complaint Diagnosis Description Type Department Provider    20  Abscess of lower lobe of right lung with pneumonia ([facility] . .. Admission (Discharged) Dannie Breaux MD           Spoke with: Yesi Theodore with [facility]     Attempted to reach patient for initial transitional call. VM left to return call to [phone]   Call to home care who confirms [facility] was       Non-face-to-face services provided:  Communication with home health agencies or other community services the patient is currently using-called to confirm [facility] was     Care Transitions 24 Hour Call    Were you discharged with any Home Care or Post Acute Services:  Yes  Post Acute Services:  Home Health (Comment: [facility] )  Care Transitions Interventions         Follow Up  No future appointments.     Chanda Trinh, RN

## 2020-09-29 ENCOUNTER — CARE COORDINATION (OUTPATIENT)
Dept: CASE MANAGEMENT | Age: 50
End: 2020-09-29

## 2020-09-29 NOTE — CARE COORDINATION
Ken [facility] Transitions Initial Follow Up Call- COVID risk follow up call        Call within 2 business days of discharge: Yes    Patient: Jade Adams Patient : 1970   MRN: [id number]  Reason for Admission: RLL pneumonia, abscess   Discharge Date: 20 RARS: Readmission Risk Score: 6      Last Discharge [address]       Complaint Diagnosis Description Type Department Provider    20  Abscess of lower lobe of right lung with pneumonia ([facility] [address] . .. Admission (Discharged) Floyd Carballo MD           Spoke with: [facility]        Patient contacted regarding David Vences. Discussed COVID-19 related testing which was available at this time. Test results were negative. Patient informed of results, if available? Yes    Care Transition Nurse/ Ambulatory Care Manager contacted the patient by telephone to perform post discharge assessment. Call within 2 business days of discharge: Yes. Verified name and  with patient as identifiers. Provided introduction to self, and explanation of the CTN/ACM role, and reason for call due to risk factors for infection and/or exposure to COVID-19. Symptoms reviewed with patient who verbalized the following symptoms: no new symptoms and no worsening symptoms. Due to no new or worsening symptoms encounter was not routed to provider for escalation. Discussed follow-up appointments. If no appointment was previously scheduled, appointment scheduling offered: Yes  [facility] follow up appointment(s): No future appointments.   Non-[facility] follow up appointment(s): 10/1- Elizabeth Mcdonnell CNP (pulm)     Non-face-to-face services provided:  Scheduled appointment with PCP-advised pt to call and schedule follow up   Scheduled appointment with Specialist-advised pt to call and schedule with Jamilah Redmond and Yoly Espinoza and reviewed discharge summary and/or continuity of care documents  Assessment and support for treatment adherence and medication management-reviewed new and changed meds     Advance Care Planning:   Does patient have an Advance Directive:  not on file; education provided. Patient has following risk factors of: pneumonia and diabetes. CTN/ACM reviewed discharge instructions, medical action plan and red flags such as increased shortness of breath, increasing fever and signs of decompensation with patient who verbalized understanding. Discussed exposure protocols and quarantine with CDC Guidelines What to do if you are sick with coronavirus disease 2019.  Patient was given an opportunity for questions and concerns. The patient agrees to contact the [facility] exposure line [phone], [facility] department [place] Department of Health: ([phone]) and PCP office for questions related to their healthcare. CTN/ACM provided contact information for future needs. Reviewed and educated patient on any new and changed medications related to discharge diagnosis     Patient/family/caregiver given information for GetWell Loop and agrees to enroll no  Patient's preferred e-mail: declined   Patient's preferred phone number: n/a   Based on Loop alert triggers, patient will be contacted by nurse care manager for worsening symptoms. Plan for follow-up call in 5-7 days based on severity of symptoms and risk factors. Call to pt who states he is doing \"pretty good\"   Denies SOB, fever/ chills.  States coughing up sputum  States occasional pain in right side upper abdomen- states he took one norco last night for this and one today      Facility: [address] Transitions 24 Hour Call    Do you have any ongoing symptoms?:  No  Do you have a copy of your discharge instructions?:  Yes  Do you have all of your prescriptions and are they filled?:  Yes  Have you been contacted by a [facility] Pharmacist?:  No  Have you scheduled your follow up appointment?:  Yes  How are you going to get to your appointment?:  Car - drive self  Were you discharged with any Home Care or Post Acute Services:  No  Post Acute Services:  Home Health (Comment: Stacie )  Do you feel like you have everything you need to keep you well at home?:  Yes  Care Transitions Interventions         Follow Up  No future appointments.     Celia Mckeon RN

## 2020-10-02 LAB — T-SPOT TB TEST: NORMAL

## 2020-10-06 ENCOUNTER — CARE COORDINATION (OUTPATIENT)
Dept: CASE MANAGEMENT | Age: 50
End: 2020-10-06

## 2020-10-06 NOTE — CARE COORDINATION
see CDC's How to Jules for follow-up call in 5-7 days based on severity of symptoms and risk factors. Called and spoke with patient. Patient states he is feeling better. Still getting IV antibiotics at this time. [facility] comes once a week to change IV dressing right arm. Patient hooks up IV himself. No C/O SOB, wheezing, chest pain, fatigue, fever, appetite good. Has slightly productive cough - green brown mucous. Patient has all medications is taking medications as directed and has no questions concerning medications at this time. Patient knows when to contact physician or report to ED with worsening or severe symptoms, changes, or concerns. Will Follow up at later time. Erik Kramer LPN     [facility] / [facility] Transitions Subsequent and Final Call    Subsequent and Final Calls  Do you have any ongoing symptoms?:  No  Have your medications changed?:  No  Do you have any questions related to your medications?:  No  Do you currently have any active services?:  Yes  Are you currently active with any services?:  Home Health  Do you have any needs or concerns that I can assist you with?:  No  Identified Barriers:  None  Care Transitions Interventions  Other Interventions: Follow Up  No future appointments.     Erik Kramer LPN

## 2020-10-13 ENCOUNTER — CARE COORDINATION (OUTPATIENT)
Dept: CASE MANAGEMENT | Age: 50
End: 2020-10-13

## 2020-10-13 ENCOUNTER — HOSPITAL ENCOUNTER (OUTPATIENT)
Age: 50
Setting detail: SPECIMEN
Discharge: HOME | End: 2020-10-13
Payer: MEDICAID

## 2020-10-13 LAB
ABSOLUTE EOS #: 0.22 K/UL (ref 0–0.44)
ABSOLUTE IMMATURE GRANULOCYTE: <0.03 K/UL (ref 0–0.3)
ABSOLUTE LYMPH #: 2.38 K/UL (ref 1.1–3.7)
ABSOLUTE MONO #: 0.33 K/UL (ref 0.1–1.2)
ALBUMIN SERPL-MCNC: 3.6 G/DL (ref 3.5–5.2)
ALBUMIN/GLOBULIN RATIO: 0.8 (ref 1–2.5)
ALP BLD-CCNC: 71 U/L (ref 40–129)
ALT SERPL-CCNC: 8 U/L (ref 5–41)
ANION GAP SERPL CALCULATED.3IONS-SCNC: 13 MMOL/L (ref 9–17)
AST SERPL-CCNC: 15 U/L
BASOPHILS # BLD: 2 % (ref 0–2)
BASOPHILS ABSOLUTE: 0.1 K/UL (ref 0–0.2)
BILIRUB SERPL-MCNC: 0.69 MG/DL (ref 0.3–1.2)
BILIRUBIN DIRECT: 0.13 MG/DL
BILIRUBIN, INDIRECT: 0.56 MG/DL (ref 0–1)
BUN BLDV-MCNC: 14 MG/DL (ref 6–20)
BUN/CREAT BLD: ABNORMAL (ref 9–20)
CALCIUM SERPL-MCNC: 9.2 MG/DL (ref 8.6–10.4)
CHLORIDE BLD-SCNC: 102 MMOL/L (ref 98–107)
CO2: 21 MMOL/L (ref 20–31)
CREAT SERPL-MCNC: 1.11 MG/DL (ref 0.7–1.2)
DIFFERENTIAL TYPE: ABNORMAL
EOSINOPHILS RELATIVE PERCENT: 4 % (ref 1–4)
GFR AFRICAN AMERICAN: >60 ML/MIN
GFR NON-AFRICAN AMERICAN: >60 ML/MIN
GFR SERPL CREATININE-BSD FRML MDRD: ABNORMAL ML/MIN/{1.73_M2}
GFR SERPL CREATININE-BSD FRML MDRD: ABNORMAL ML/MIN/{1.73_M2}
GLOBULIN: ABNORMAL G/DL (ref 1.5–3.8)
GLUCOSE BLD-MCNC: 230 MG/DL (ref 70–99)
HCT VFR BLD CALC: 40 % (ref 40.7–50.3)
HEMOGLOBIN: 12.6 G/DL (ref 13–17)
IMMATURE GRANULOCYTES: 0 %
LYMPHOCYTES # BLD: 38 % (ref 24–43)
MCH RBC QN AUTO: 29.8 PG (ref 25.2–33.5)
MCHC RBC AUTO-ENTMCNC: 31.5 G/DL (ref 28.4–34.8)
MCV RBC AUTO: 94.6 FL (ref 82.6–102.9)
MONOCYTES # BLD: 5 % (ref 3–12)
NRBC AUTOMATED: 0 PER 100 WBC
PDW BLD-RTO: 13.2 % (ref 11.8–14.4)
PLATELET # BLD: 248 K/UL (ref 138–453)
PLATELET ESTIMATE: ABNORMAL
PMV BLD AUTO: 10.8 FL (ref 8.1–13.5)
POTASSIUM SERPL-SCNC: 4.2 MMOL/L (ref 3.7–5.3)
RBC # BLD: 4.23 M/UL (ref 4.21–5.77)
RBC # BLD: ABNORMAL 10*6/UL
SEG NEUTROPHILS: 51 % (ref 36–65)
SEGMENTED NEUTROPHILS ABSOLUTE COUNT: 3.15 K/UL (ref 1.5–8.1)
SODIUM BLD-SCNC: 136 MMOL/L (ref 135–144)
TOTAL PROTEIN: 7.9 G/DL (ref 6.4–8.3)
WBC # BLD: 6.2 K/UL (ref 3.5–11.3)
WBC # BLD: ABNORMAL 10*3/UL

## 2020-10-13 PROCEDURE — 80076 HEPATIC FUNCTION PANEL: CPT

## 2020-10-13 PROCEDURE — 80048 BASIC METABOLIC PNL TOTAL CA: CPT

## 2020-10-13 PROCEDURE — 85025 COMPLETE CBC W/AUTO DIFF WBC: CPT

## 2020-10-13 NOTE — CARE COORDINATION
Ken [facility] Transitions Follow Up Call- final COVID risk follow up call        10/13/2020    Patient: Leonel Jordan  Patient : 1970   MRN: [id number]  Reason for Admission:  RLL pneumonia, abscess   Discharge Date: 20 RARS: Readmission Risk Score: 6         Spoke with: Mei    Call to pt who states he was supposed to follow up with Dr Raghavendra Adams but they don't take his insurance- he states he thinks their office will refer to another surgeon- Gustav Bumpers strongly advised calling insurance tomorrow to see who he could be referred to for surgery- v/u  States he is out of IV flushes and has 5 days of IV abx left- (writer messaged Julianne Ledesma with [facility] requesting more flushes be sent to him)- writer advised he ask home care nurse to see if they have any to supply him- v/u  States he does not go to Dr Pawel Walter anymore (PCP)- writer advised calling Dr Robinson Jean Baptiste office about removing drain or return to ER to see if they can remove it- v/u   Writer will follow up with him again on Thurs 10/15    Writer message Dr Rimma Bonner (ID) to see about referral or options for drain for now since it is no longer draining- he recommends a new referral for surg or ER       Care Transitions Subsequent and Final Call    Subsequent and Final Calls  Do you have any ongoing symptoms?:  No  Have your medications changed?:  No  Do you have any questions related to your medications?:  Yes  Patient Reports:  needs saline flushes for midline IV line- see notes   Do you currently have any active services?:  Yes  Are you currently active with any services?:  Home Health  Do you have any needs or concerns that I can assist you with?:  No  Identified Barriers:  Lack of Education  Care Transitions Interventions  Other Interventions:             Follow Up  Future Appointments   Date Time Provider Shahab Miller   2020 10:30 AM Darby Goins MD INFT DISEASE Philip Nina RN

## 2020-10-16 ENCOUNTER — CARE COORDINATION (OUTPATIENT)
Dept: CASE MANAGEMENT | Age: 50
End: 2020-10-16

## 2020-10-16 NOTE — CARE COORDINATION
[facility] 45 Transitions Follow Up Call    10/16/2020    Patient: Tico Mccrary  Patient : 1970   MRN: <[id number]>  Reason for Admission: Abscess of lower lob of right lung with pneumonia  Discharge Date: 20 RARS: Readmission Risk Score: 6         Spoke with: patient    Spoke with Tony Stephens, he states is still unable to find a surgeon who will help him. Patient states he has made several calls and can not find anyone who will take his insurance. He did [facility] He was told the surgeon who started this should be the one to finish. However, he stated Dr. Ximena Bonilla will not see him. This writer is going to refer this patient to MINDY Hull for any input she may have. Patient states his drain tube is just hanging out and [facility]. He does deny fever, chills SOB or pain,  Just \"uncomfortable\"    Will also put this patient in for a nurse call on Monday afternoon.           Julissa Nicolas

## 2020-10-19 ENCOUNTER — HOSPITAL ENCOUNTER (EMERGENCY)
Age: 50
Discharge: HOME | End: 2020-10-19
Attending: EMERGENCY MEDICINE
Payer: MEDICAID

## 2020-10-19 ENCOUNTER — CARE COORDINATION (OUTPATIENT)
Dept: CASE MANAGEMENT | Age: 50
End: 2020-10-19

## 2020-10-19 ENCOUNTER — CARE COORDINATION (OUTPATIENT)
Dept: OTHER | Facility: CLINIC | Age: 50
End: 2020-10-19

## 2020-10-19 ENCOUNTER — TELEPHONE (OUTPATIENT)
Dept: OTHER | Facility: CLINIC | Age: 50
End: 2020-10-19

## 2020-10-19 VITALS
BODY MASS INDEX: 30.21 KG/M2 | HEART RATE: 64 BPM | HEIGHT: 69 IN | SYSTOLIC BLOOD PRESSURE: 148 MMHG | OXYGEN SATURATION: 98 % | TEMPERATURE: 98.6 F | DIASTOLIC BLOOD PRESSURE: 85 MMHG | RESPIRATION RATE: 16 BRPM | WEIGHT: 204 LBS

## 2020-10-19 PROCEDURE — 99282 EMERGENCY DEPT VISIT SF MDM: CPT

## 2020-10-19 PROCEDURE — 99283 EMERGENCY DEPT VISIT LOW MDM: CPT

## 2020-10-19 ASSESSMENT — ENCOUNTER SYMPTOMS
ABDOMINAL PAIN: 0
ABDOMINAL DISTENTION: 0
EYE REDNESS: 0
TROUBLE SWALLOWING: 0
VOMITING: 0
BLOOD IN STOOL: 0
SHORTNESS OF BREATH: 0

## 2020-10-19 NOTE — CARE COORDINATION
Ken 45 Transitions Follow Up Call- final COVID risk follow up call        10/19/2020    Patient: Basil Todd  Patient : 1970   MRN: [id number]  Reason for Admission: RLL lung abscess, kristy   Discharge Date: 20 RARS: Readmission Risk Score: 6         Spoke with: Ned/    Patient called stating he was able to get in with surgeon 10/30 but still has drain tube (Kristy)- writer advised going to [facility] ER to have this removed    Call to [address] with [facility] ED to inform pt coming in to have drain removed    You Patient resolved from the Care Transitions episode on 10/19/2020  Discussed COVID-19 related testing which was available at this time. Test results were negative. Patient informed of results, if available? N/a     Patient/family has been provided the following resources and education related to COVID-19:                         Signs, symptoms and red flags related to COVID-19            CDC exposure and quarantine guidelines            [facility] exposure contact - [phone]            Contact for their local Department of Health                 Patient currently reports that the following symptoms have improved:  no new/worsening symptoms     No further outreach scheduled with this CTN/ACM. Episode of Care resolved. Patient has this CTN/ACM contact information if future needs arise. Care Transitions Subsequent and Final Call    Subsequent and Final Calls  Do you have any ongoing symptoms?:  No  Have your medications changed?:  No  Do you have any questions related to your medications?:  No  Do you currently have any active services?:  Yes  Are you currently active with any services?:  Home Health  Do you have any needs or concerns that I can assist you with?:  Yes  Patient-reported Needs or Concerns:   advice on removal of kristy drain   Identified Barriers:  Lack of Education  Care Transitions Interventions  Other Interventions:             Follow Up  Future Appointments   Date Time Provider Shahab Miller   10/30/2020 10:00 AM Daniela Arango MD [facility]   11/4/2020 10:30 AM Ina Humphrey MD INFT DISEASE Kai Harrison RN

## 2020-10-19 NOTE — TELEPHONE ENCOUNTER
Writer contacted Dr. Cecy Conway,  ED provider to inform of 30 day readmission risk. Writer's attempt to contact ED provider was unsuccessful.

## 2020-10-19 NOTE — ED PROVIDER NOTES
EMERGENCY DEPARTMENT ENCOUNTER    Pt Name: Charles Martines  MRN: [id number]  [place] 1970  Date of evaluation: 10/19/20  CHIEF COMPLAINT       Chief Complaint   Patient presents with    Other     GB drain not draining     HISTORY OF PRESENT ILLNESS   Patient is a 50-year-old male with recent lung abscess and cholelithiasis with right upper quadrant pain who had a closed ostomy tube placed by IR at the end of September here feeling much better and stating that his drain has not drained for about a week to week and a half and he is hoping to get it out. He states the surgeon that he was supposed to follow-up for potential removal told him his insurance was out of network and so he does not know who should remove it. He denies any fevers chills cough no nausea vomiting moving his bowels normal no abdominal pain. States the drain has no signs of infection around it. REVIEW OF SYSTEMS     Review of Systems   Constitutional: Negative for fever. HENT: Negative for trouble swallowing. Eyes: Negative for redness. Respiratory: Negative for shortness of breath. Gastrointestinal: Negative for abdominal distention, abdominal pain, blood in stool and vomiting. Genitourinary: Negative for difficulty urinating. Musculoskeletal: Negative for neck stiffness. Skin: Negative for rash. Neurological: Negative for seizures. Psychiatric/Behavioral: Negative for confusion.      PASTMEDICAL HISTORY     Past Medical History:   Diagnosis Date    Diabetes mellitus ([address]      SURGICAL HISTORY       Past Surgical History:   Procedure Laterality Date    CHOLECYSTECTOMY      IR CHOLECYSTOSTOMY PERCUTANEOUS COMPLETE  9/23/2020    IR CHOLECYSTOSTOMY PERCUTANEOUS COMPLETE 9/23/2020 Carolyn Jones MD [facility] MEDICATIONS       Discharge Medication List as of 10/19/2020  4:25 PM      CONTINUE these medications which have NOT CHANGED    Details   insulin glargine (LANTUS) 100 UNIT/ML injection vial Inject 12 Units into the skin dailyHistorical Med      insulin lispro (HUMALOG) 100 UNIT/ML injection vial Inject 2-10 Units into the skin as needed (per sliding scale)Historical Med      albuterol sulfate HFA (VENTOLIN HFA) 108 (90 Base) MCG/ACT inhaler Inhale 2 puffs into the lungs every 6 hours as needed for WheezingHistorical Med      ibuprofen (ADVIL;MOTRIN) 800 MG tablet Take 800 mg by mouth every 8 hours as needed for PainHistorical Med      metFORMIN (GLUCOPHAGE) 500 MG tablet Take 500 mg by mouth 2 times daily (with meals)Historical Med           ALLERGIES     is allergic to sulfa antibiotics. FAMILY HISTORY     has no family status information on file. SOCIAL HISTORY       Social History     Tobacco Use    Smoking status: Never Smoker    Smokeless tobacco: Never Used   Substance Use Topics    Alcohol use: Not Currently    Drug use: Not Currently     PHYSICAL EXAM     INITIAL VITALS: BP (!) 148/85   Pulse 64   Temp 98.6 °F (37 °C) (Oral)   Resp 16   Ht 5' 9\" (1.753 m)   Wt 204 lb (92.5 kg)   SpO2 98%   BMI 30.13 kg/m²    Physical Exam  Vitals signs and nursing note reviewed. Constitutional:       General: He is not in acute distress. Appearance: Normal appearance. He is obese. He is not ill-appearing, toxic-appearing or diaphoretic. HENT:      Head: Normocephalic and atraumatic. Mouth/Throat:      Mouth: Mucous membranes are moist.      Pharynx: Oropharynx is clear. Eyes:      General: No scleral icterus. Extraocular Movements: Extraocular movements intact. Pupils: Pupils are equal, round, and reactive to light. Neck:      Musculoskeletal: Normal range of motion and neck supple. Cardiovascular:      Rate and Rhythm: Normal rate and regular rhythm. Pulses: Normal pulses. Heart sounds: Normal heart sounds. Pulmonary:      Effort: Pulmonary effort is normal. No respiratory distress. Abdominal:      General: There is no distension. Palpations: Abdomen is soft. There is no mass or pulsatile mass. Tenderness: There is no abdominal tenderness. There is no guarding or rebound. Negative signs include David's sign and McBurney's sign. Hernia: No hernia is present. Comments: Drain to the right upper quadrant with no surrounding erythema warmth or drainage, very small amount of serous fluid in the tubing and in the bag   Musculoskeletal: Normal range of motion. General: No deformity. Skin:     General: Skin is warm and dry. Capillary Refill: Capillary refill takes less than 2 seconds. Neurological:      General: No focal deficit present. Mental Status: He is alert and oriented to person, place, and time. Psychiatric:         Thought Content: Thought content normal.         MEDICAL DECISION MAKING:          Please see ED Course below for MDM/ED course. DDx: Cholecostomy tube    All patient's question's and concerns were answered prior to disposition and patient and/or family expressed understanding and agreement of treatment plan. NIH STROKE SCALE:            PROCEDURES:    Procedures    DIAGNOSTIC RESULTS   EKG:All EKG's are interpreted by the Emergency Department Physician who either signs or Co-signs this chart in the absence of a cardiologist.      RADIOLOGY:All plain film, CT, MRI, and formal ultrasound images (except ED bedside ultrasound) are read by the radiologist, see reports below, unless otherwisenoted in MDM or here. No orders to display     LABS: All lab results were reviewed by myself, and all abnormals are listed below. Labs Reviewed - No data to display    EMERGENCY DEPARTMENTCOURSE:     Patient is a 79-year-old male here with cholecystostomy tube that he would like removed as it is no longer draining and his symptoms have improved. He appears well and is nontoxic afebrile. Minimal serous drainage from the tube.   Will consult IR and possibly general surgery to facilitate care.    IR did evaluate and they were able to flush the drain and it is now draining normal serous dark yellow-brown fluid. They state no further intervention by them. I did speak with Dr. Shayan Rodríguez who evaluated the patient while he was in the hospital and updated him that the patient has insurance that is not covered by his practice. He states that he advises that patient to find a surgeon that we will be able to cover. I did recommend surgery clinic for follow-up and the patient also states he has an appointment with a new PCP in a week who may be able to refer him. Otherwise standard cholecystostomy care and follow-up with surgeon for removal.  Return if symptoms worsen. Vitals:    Vitals:    10/19/20 1353   BP: (!) 148/85   Pulse: 64   Resp: 16   Temp: 98.6 °F (37 °C)   TempSrc: Oral   SpO2: 98%   Weight: 204 lb (92.5 kg)   Height: 5' 9\" (1.753 m)       The patient was given the following medications while in the emergency department:  No orders of the defined types were placed in this encounter. CONSULTS:  IP CONSULT TO INTERVENTIONAL RADIOLOGY  IP CONSULT TO GENERAL SURGERY    FINAL IMPRESSION      1. Cholecystostomy care [facility])          DISPOSITION/PLAN   DISPOSITION Decision To Discharge 10/19/2020 04:24:26 PM      PATIENT REFERRED TO:  [facility] ED  [address] [facility]  [phone]    If symptoms worsen    SARI MARLEEDhruv [facility] [place] [phone]  Schedule an appointment as soon as possible for a visit   For Gallbladder tube evaluation    DISCHARGE MEDICATIONS:  Discharge Medication List as of 10/19/2020  4:25 PM        Qian Beauchamp MD  Attending Emergency Physician    This note was created with the assistance of a speech-recognition program. While intending to generate a document that actually reflects the content of the visit, no guarantees can be provided that every mistake has been identified and corrected by editing.

## 2020-10-19 NOTE — CARE COORDINATION
TC to Ximena Stroud to follow up on referral received. Discussed pt's needs and there isn't anything that social work would be able to do for this pt, due to exhausting resources. Pt stated he has called over 21 surgeons who will not take his case and per previous notes, he has called insurance for assistance and they have told him that the original surgeon should provide continuity of care. His PCP advised him to report to ED for management of the drain. Ximena Stroud informed writer that today is the last visit for Fermín  as well. Based on his needs, it was agreed that pt's only option is likely to return to hospital, which he has been informed to do in previous calls. Pt's drain is hanging, per his assessment, and presents an infection risk as well. Will be discussed on next CTN outreach, no social work outreach to pt at this time.      Stanislav Golden  MSW Student

## 2020-10-19 NOTE — PROGRESS NOTES
Cholecystostomy tube flushed. Initial resistance suggests the tube may have been clogged. Tube now appears to function properly. Recommend BID flushes with sterile saline.     You Dorsey MD  Interventional Radiology

## 2020-10-20 ENCOUNTER — TELEPHONE (OUTPATIENT)
Dept: OTHER | Facility: CLINIC | Age: 50
End: 2020-10-20

## 2020-10-30 ENCOUNTER — TELEPHONE (OUTPATIENT)
Dept: INTERVENTIONAL RADIOLOGY/VASCULAR | Age: 50
End: 2020-10-30

## 2020-11-03 PROBLEM — J18.9 PNEUMONIA: Status: RESOLVED | Noted: 2020-09-22 | Resolved: 2020-11-03

## 2020-11-04 ENCOUNTER — OFFICE VISIT (OUTPATIENT)
Dept: INFECTIOUS DISEASES | Age: 50
End: 2020-11-04
Payer: MEDICAID

## 2020-11-04 ENCOUNTER — OFFICE VISIT (OUTPATIENT)
Dept: SURGERY | Age: 50
End: 2020-11-04
Payer: MEDICAID

## 2020-11-04 VITALS
BODY MASS INDEX: 32.29 KG/M2 | DIASTOLIC BLOOD PRESSURE: 88 MMHG | HEIGHT: 69 IN | HEART RATE: 83 BPM | TEMPERATURE: 97.2 F | WEIGHT: 218 LBS | SYSTOLIC BLOOD PRESSURE: 133 MMHG

## 2020-11-04 VITALS
WEIGHT: 219.8 LBS | DIASTOLIC BLOOD PRESSURE: 91 MMHG | RESPIRATION RATE: 22 BRPM | SYSTOLIC BLOOD PRESSURE: 142 MMHG | BODY MASS INDEX: 32.56 KG/M2 | OXYGEN SATURATION: 99 % | HEIGHT: 69 IN | HEART RATE: 78 BPM

## 2020-11-04 PROBLEM — M65.312 TRIGGER THUMB OF LEFT HAND: Status: ACTIVE | Noted: 2020-05-15

## 2020-11-04 PROCEDURE — G8417 CALC BMI ABV UP PARAM F/U: HCPCS | Performed by: STUDENT IN AN ORGANIZED HEALTH CARE EDUCATION/TRAINING PROGRAM

## 2020-11-04 PROCEDURE — 1036F TOBACCO NON-USER: CPT | Performed by: INTERNAL MEDICINE

## 2020-11-04 PROCEDURE — G8427 DOCREV CUR MEDS BY ELIG CLIN: HCPCS | Performed by: INTERNAL MEDICINE

## 2020-11-04 PROCEDURE — 1036F TOBACCO NON-USER: CPT | Performed by: STUDENT IN AN ORGANIZED HEALTH CARE EDUCATION/TRAINING PROGRAM

## 2020-11-04 PROCEDURE — G8484 FLU IMMUNIZE NO ADMIN: HCPCS | Performed by: STUDENT IN AN ORGANIZED HEALTH CARE EDUCATION/TRAINING PROGRAM

## 2020-11-04 PROCEDURE — G8417 CALC BMI ABV UP PARAM F/U: HCPCS | Performed by: INTERNAL MEDICINE

## 2020-11-04 PROCEDURE — 99203 OFFICE O/P NEW LOW 30 MIN: CPT | Performed by: STUDENT IN AN ORGANIZED HEALTH CARE EDUCATION/TRAINING PROGRAM

## 2020-11-04 PROCEDURE — 99211 OFF/OP EST MAY X REQ PHY/QHP: CPT | Performed by: STUDENT IN AN ORGANIZED HEALTH CARE EDUCATION/TRAINING PROGRAM

## 2020-11-04 PROCEDURE — 3017F COLORECTAL CA SCREEN DOC REV: CPT | Performed by: STUDENT IN AN ORGANIZED HEALTH CARE EDUCATION/TRAINING PROGRAM

## 2020-11-04 PROCEDURE — G8427 DOCREV CUR MEDS BY ELIG CLIN: HCPCS | Performed by: STUDENT IN AN ORGANIZED HEALTH CARE EDUCATION/TRAINING PROGRAM

## 2020-11-04 PROCEDURE — G8484 FLU IMMUNIZE NO ADMIN: HCPCS | Performed by: INTERNAL MEDICINE

## 2020-11-04 PROCEDURE — 99214 OFFICE O/P EST MOD 30 MIN: CPT | Performed by: INTERNAL MEDICINE

## 2020-11-04 PROCEDURE — 3017F COLORECTAL CA SCREEN DOC REV: CPT | Performed by: INTERNAL MEDICINE

## 2020-11-04 ASSESSMENT — ENCOUNTER SYMPTOMS
RESPIRATORY NEGATIVE: 1
GASTROINTESTINAL NEGATIVE: 1
ALLERGIC/IMMUNOLOGIC NEGATIVE: 1

## 2020-11-04 NOTE — PROGRESS NOTES
[facility]  Office Progress Note  Today's Date and Time: 11/4/2020, 10:47 AM    Impression:     1. Abscess of lower lobe of right lung with pneumonia ([facility] [address]    2. Acute cholecystitis    3. Cholecystostomy care [facility])         Recommendations   · The patient completed a 4-week course of IV antimicrobial therapy with ceftaroline and fluconazole  · He does not have any respiratory complaints whatsoever. · I will plan on repeating a CT of the chest to evaluate the progress of the right lower lobe pneumonia/abscess  · The patient is scheduled for a cholecystectomy next week and hopefully we can get the CT done prior to the cholecystectomy procedure    I have ordered the following medications/ labs:  Orders Placed This Encounter   Procedures    CT CHEST WO CONTRAST     Standing Status:   Future     Standing Expiration Date:   11/4/2021      No orders of the defined types were placed in this encounter. Chief complaint/reason for consultation:     Chief Complaint   Patient presents with    Follow-Up from [facility]. [place]        History of Present Illness:   Caro Arizmendi is a 45y.o.-year-old male who I am seeing in follow-up after recent hospital stay at [facility] and [facility] in late September. Angelia Keane has a history of COVID-19 virus infection treated in June/July at [facility] and he was subsequently admitted with a right lower lobe pneumonia versus abscess as well as cholecystitis for which he underwent percutaneous drain placement 9/23/2020. The patient was discharged on IV antimicrobial therapy with Teflaro and fluconazole which he completed on October 13, 2020. The patient does tell me that he was seen in follow-up by the surgeon but had to be sent to a different surgeon due to insurance reasons. Clinically he is doing well does not report any respiratory issues at this time. No significant cough or shortness of breath, no chest pains/pleurisy.   No abdominal pain nausea vomiting or diarrhea. The percutaneous drain remains in place. He does tell me that he is scheduled for cholecystectomy early next week with Dr. Neri Resendez. I have personally reviewedthe past medical history, medications, social history, and I have updated the database accordingly. Past Medical History:     Past Medical History:   Diagnosis Date    Diabetes mellitus (HCC)      Medications:     Current Outpatient Medications   Medication Sig Dispense Refill    insulin glargine (LANTUS) 100 UNIT/ML injection vial Inject 12 Units into the skin daily      insulin lispro (HUMALOG) 100 UNIT/ML injection vial Inject 2-10 Units into the skin as needed (per sliding scale)      albuterol sulfate HFA (VENTOLIN HFA) 108 (90 Base) MCG/ACT inhaler Inhale 2 puffs into the lungs every 6 hours as needed for Wheezing      ibuprofen (ADVIL;MOTRIN) 800 MG tablet Take 800 mg by mouth every 8 hours as needed for Pain      metFORMIN (GLUCOPHAGE) 500 MG tablet Take 500 mg by mouth 2 times daily (with meals)       No current facility-administered medications for this visit. Allergies:   Sulfa antibiotics     Review of Systems:   Review of Systems   Constitutional: Negative. Respiratory: Negative. Cardiovascular: Negative. Gastrointestinal: Negative. Genitourinary: Negative. Musculoskeletal: Negative. Allergic/Immunologic: Negative. Neurological: Negative. Psychiatric/Behavioral: Negative. Physical Examination :   BP (!) 142/91   Pulse 78   Resp 22   Ht 5' 9.02\" (1.753 m)   Wt 219 lb 12.8 oz (99.7 kg)   SpO2 99%   BMI 32.44 kg/m²     Physical Exam  Constitutional:       Appearance: He is well-developed. HENT:      Head: Normocephalic and atraumatic. Neck:      Musculoskeletal: Normal range of motion and neck supple. Cardiovascular:      Rate and Rhythm: Normal rate. Heart sounds: Normal heart sounds. No friction rub. No gallop.     Pulmonary:      Effort: Pulmonary effort is normal.      Breath sounds: Normal breath sounds. No wheezing. Abdominal:      General: Bowel sounds are normal.      Palpations: Abdomen is soft. There is no mass. Tenderness: There is no abdominal tenderness. Comments: There is a right upper quadrant percutaneous drain in place with biliary output noted. Musculoskeletal: Normal range of motion. Lymphadenopathy:      Cervical: No cervical adenopathy. Skin:     General: Skin is warm and dry. Neurological:      Mental Status: He is alert and oriented to person, place, and time. Laboratory studies :  Medical Decision Making:   I have independently reviewed the following labs:  CBC with Differential:  Lab Results   Component Value Date    WBC 6.2 10/13/2020    WBC 9.8 09/26/2020    HGB 12.6 10/13/2020    HGB 12.2 09/26/2020    HCT 40.0 10/13/2020    HCT 39.3 09/26/2020     10/13/2020     09/26/2020    LYMPHOPCT 38 10/13/2020    LYMPHOPCT 41 09/25/2020    MONOPCT 5 10/13/2020    MONOPCT 7 09/25/2020       BMP:  Lab Results   Component Value Date     10/13/2020     09/26/2020    K 4.2 10/13/2020    K 4.0 09/26/2020     10/13/2020     09/26/2020    CO2 21 10/13/2020    CO2 26 09/26/2020    BUN 14 10/13/2020    BUN 9 09/26/2020    CREATININE 1.11 10/13/2020    CREATININE 1.17 09/26/2020       Hepatic Function Panel:   Lab Results   Component Value Date    PROT 7.9 10/13/2020    LABALBU 3.6 10/13/2020    BILIDIR 0.13 10/13/2020    IBILI 0.56 10/13/2020    BILITOT 0.69 10/13/2020    ALKPHOS 71 10/13/2020    ALT 8 10/13/2020    AST 15 10/13/2020       No results found for: CRP  No results found for: SEDRATE        Thank you for allowing us to participate in the care of this patient. Pleasecall with questions.     Tess Mukherjee MD  Perfect Serve messaging: [phone]    This note is created with the assistance of a speech recognition program.  While intending to generate a document that actually reflects the content ofthe visit, the document can still have some errors including those of syntax and sound a like substitutions which may escape proof reading. It such instances, actual meaning can be extrapolated by contextual diversion.

## 2020-11-04 NOTE — PROGRESS NOTES
Visit Information    Have you changed or started any medications since your last visit including any over-the-counter medicines, vitamins, or herbal medicines? no   Have you stopped taking any of your medications? Is so, why? -  no  Are you having any side effects from any of your medications? - no    Have you seen any other physician or provider since your last visit?  no   Have you had any other diagnostic tests since your last visit?  no   Have you been seen in the emergency room and/or had an admission in a hospital since we last saw you?  yes - [facility]   Have you had your routine dental cleaning in the past 6 months?  no     Do you have an active MyChart account? If no, what is the barrier?   No: Pending    Patient Care Team:  Sam Sal MD as PCP - General (Family Medicine)  Syed Galvez MD as Consulting Physician (Infectious Diseases)    Medical History Review  Past Medical, Family, and Social History reviewed and does not contribute to the patient presenting condition    Health Maintenance   Topic Date Due    Pneumococcal 0-64 years Vaccine (1 of 1 - PPSV23) 07/03/1975    Diabetic foot exam  07/03/1979    Diabetic retinal exam  07/03/1979    Lipid screen  07/03/1979    Diabetic microalbuminuria test  07/03/1987    Hepatitis B vaccine (1 of 3 - Risk 3-dose series) 07/03/1988    DTaP/Tdap/Td vaccine (1 - Tdap) 07/03/1988    Shingles Vaccine (1 of 2) 07/03/2019    Colon cancer screen colonoscopy  07/03/2019    Flu vaccine (1) 09/01/2020    A1C test (Diabetic or Prediabetic)  12/23/2020    HIV screen  Completed    Hepatitis A vaccine  Aged Out    Hib vaccine  Aged Out    Meningococcal (ACWY) vaccine  Aged Out

## 2020-11-04 NOTE — PROGRESS NOTES
500 MG tablet Take 500 mg by mouth 2 times daily (with meals)       No current facility-administered medications for this visit. Allergies   Allergen Reactions    Sulfa Antibiotics Hives and Rash       No family history on file. Social History     Socioeconomic History    Marital status: Single     Spouse name: Not on file    Number of children: Not on file    Years of education: Not on file    Highest education level: Not on file   Occupational History    Not on file   Social Needs    Financial resource strain: Not on file    Food insecurity     Worry: Not on file     Inability: Not on file    Transportation needs     Medical: Not on file     Non-medical: Not on file   Tobacco Use    Smoking status: Never Smoker    Smokeless tobacco: Never Used   Substance and Sexual Activity    Alcohol use: Not Currently    Drug use: Not Currently    Sexual activity: Not on file   Lifestyle    Physical activity     Days per week: Not on file     Minutes per session: Not on file    Stress: Not on file   Relationships    Social connections     Talks on phone: Not on file     Gets together: Not on file     Attends [employer] service: Not on file     Active member of club or organization: Not on file     Attends meetings of clubs or organizations: Not on file     Relationship status: Not on file    Intimate partner violence     Fear of current or ex partner: Not on file     Emotionally abused: Not on file     Physically abused: Not on file     Forced sexual activity: Not on file   Other Topics Concern    Not on file   Social History Narrative    Not on file       ROS:   GEN: Denies recent weight loss, fatigue, fevers, chills. HEENT: No rhinorrhea, dysphagia, odynphagia. CV: No history of MI, recent chest pain. RESP: Denies shortness of breath, COPD, asthma. GI: Denies recurrent abdominal pain  : Denies increased frequency or dysuria. HEM[de-identified] Denies history of anemia or DVTs.   ENDO: Denies history of thyroid problems or diabetes. NEURO: Denies history of CVA, TIA. Physical Exam:  Vitals:    11/04/20 0821   BP: 133/88   Pulse: 83   Temp:      General:A & O x3  HEENT:  NCAT, PERRL, EMOI, oral mucus membrane pink and moist, no mass palpated on neck exam  BREAST:Deferred  Heart: S1S2, no mumurs, RRR  Lungs: Unlabored breathing on RA  Abdomen: Soft, obese, nondistended, no TTP. No surgical scars  Extremity: Normal, without deformities, edema, or skin discoloration  SKIN: Skin color, texture, turgor normal. No rashes or lesions. Neuro: CN II-XII grossly intact. No motor or sensory deficits appreciated. MK:normal throughout upper and lower extremities    Assessment     45 M with Cholecystitis s/p percutaneous cholecystostomy tube placement presents to discuss elective laparoscopic cholecystectomy    Plan    1. Schedule laparoscopic biotic cholecystectomy with Dr. Lisette Pop at your convenience. Shahbaz Marion  11/4/2020    Attending Physician Statement  I have discussed the case with Dr Ankit Blanca, including pertinent history and exam findings with the resident. I have seen and examined the patient and the key elements of the encounter have been performed by me. I agree with the assessment, plan and orders as documented by the resident.       Electronically signed by Kelby Oscar IV, DO  on 11/11/2020 at 9:39 AM

## 2020-11-04 NOTE — PATIENT INSTRUCTIONS
Thank you letting us take care of you today. We hope that all your questions were addressed. If a question was overlooked or something else comes to mind after you return home, please call our office at [phone]. If you need to cancel or change an appointment, surgery or procedure, please contact the office at [phone].     1.  Schedule laparoscopic cholecystectomy with Dr. Shoshana Leong at your convenience.

## 2020-11-05 ENCOUNTER — OFFICE VISIT (OUTPATIENT)
Dept: FAMILY MEDICINE CLINIC | Age: 50
End: 2020-11-05
Payer: MEDICAID

## 2020-11-05 ENCOUNTER — HOSPITAL ENCOUNTER (OUTPATIENT)
Age: 50
Setting detail: SPECIMEN
Discharge: HOME | End: 2020-11-05
Payer: MEDICAID

## 2020-11-05 VITALS
HEIGHT: 69 IN | HEART RATE: 75 BPM | TEMPERATURE: 97 F | DIASTOLIC BLOOD PRESSURE: 80 MMHG | SYSTOLIC BLOOD PRESSURE: 134 MMHG | BODY MASS INDEX: 32.11 KG/M2 | WEIGHT: 216.8 LBS

## 2020-11-05 LAB — HBA1C MFR BLD: 9.3 %

## 2020-11-05 PROCEDURE — G8484 FLU IMMUNIZE NO ADMIN: HCPCS | Performed by: STUDENT IN AN ORGANIZED HEALTH CARE EDUCATION/TRAINING PROGRAM

## 2020-11-05 PROCEDURE — 3017F COLORECTAL CA SCREEN DOC REV: CPT | Performed by: STUDENT IN AN ORGANIZED HEALTH CARE EDUCATION/TRAINING PROGRAM

## 2020-11-05 PROCEDURE — 90715 TDAP VACCINE 7 YRS/> IM: CPT | Performed by: FAMILY MEDICINE

## 2020-11-05 PROCEDURE — 99203 OFFICE O/P NEW LOW 30 MIN: CPT | Performed by: STUDENT IN AN ORGANIZED HEALTH CARE EDUCATION/TRAINING PROGRAM

## 2020-11-05 PROCEDURE — G0009 ADMIN PNEUMOCOCCAL VACCINE: HCPCS | Performed by: FAMILY MEDICINE

## 2020-11-05 PROCEDURE — 2022F DILAT RTA XM EVC RTNOPTHY: CPT | Performed by: STUDENT IN AN ORGANIZED HEALTH CARE EDUCATION/TRAINING PROGRAM

## 2020-11-05 PROCEDURE — G0010 ADMIN HEPATITIS B VACCINE: HCPCS | Performed by: FAMILY MEDICINE

## 2020-11-05 PROCEDURE — G8427 DOCREV CUR MEDS BY ELIG CLIN: HCPCS | Performed by: STUDENT IN AN ORGANIZED HEALTH CARE EDUCATION/TRAINING PROGRAM

## 2020-11-05 PROCEDURE — 1036F TOBACCO NON-USER: CPT | Performed by: STUDENT IN AN ORGANIZED HEALTH CARE EDUCATION/TRAINING PROGRAM

## 2020-11-05 PROCEDURE — 83036 HEMOGLOBIN GLYCOSYLATED A1C: CPT | Performed by: STUDENT IN AN ORGANIZED HEALTH CARE EDUCATION/TRAINING PROGRAM

## 2020-11-05 PROCEDURE — G8417 CALC BMI ABV UP PARAM F/U: HCPCS | Performed by: STUDENT IN AN ORGANIZED HEALTH CARE EDUCATION/TRAINING PROGRAM

## 2020-11-05 PROCEDURE — 3046F HEMOGLOBIN A1C LEVEL >9.0%: CPT | Performed by: STUDENT IN AN ORGANIZED HEALTH CARE EDUCATION/TRAINING PROGRAM

## 2020-11-05 RX ORDER — GLUCOSAMINE HCL/CHONDROITIN SU 500-400 MG
CAPSULE ORAL
Qty: 120 STRIP | Refills: 3 | Status: SHIPPED | OUTPATIENT
Start: 2020-11-05 | End: 2020-11-24 | Stop reason: SDUPTHER

## 2020-11-05 RX ORDER — LANCETS 30 GAUGE
1 EACH MISCELLANEOUS 4 TIMES DAILY
Qty: 600 EACH | Refills: 1 | Status: SHIPPED | OUTPATIENT
Start: 2020-11-05 | End: 2021-06-25 | Stop reason: SDUPTHER

## 2020-11-05 RX ORDER — SYRINGE W-NEEDLE,DISPOSAB,3 ML 25GX5/8"
1 SYRINGE, EMPTY DISPOSABLE MISCELLANEOUS 4 TIMES DAILY
Qty: 120 EACH | Refills: 2 | Status: SHIPPED | OUTPATIENT
Start: 2020-11-05 | End: 2021-06-25

## 2020-11-05 ASSESSMENT — ENCOUNTER SYMPTOMS
COUGH: 0
SORE THROAT: 0
ABDOMINAL PAIN: 0
BACK PAIN: 0
SHORTNESS OF BREATH: 0
VOMITING: 0
RHINORRHEA: 0
NAUSEA: 0
DIARRHEA: 0
WHEEZING: 0

## 2020-11-05 ASSESSMENT — PATIENT HEALTH QUESTIONNAIRE - PHQ9
SUM OF ALL RESPONSES TO PHQ QUESTIONS 1-9: 0
1. LITTLE INTEREST OR PLEASURE IN DOING THINGS: 0
2. FEELING DOWN, DEPRESSED OR HOPELESS: 0
SUM OF ALL RESPONSES TO PHQ QUESTIONS 1-9: 0
SUM OF ALL RESPONSES TO PHQ9 QUESTIONS 1 & 2: 0
SUM OF ALL RESPONSES TO PHQ QUESTIONS 1-9: 0

## 2020-11-05 NOTE — PROGRESS NOTES
Diabetic visit information    BP Readings from Last 3 Encounters:   11/05/20 134/80   11/04/20 (!) 142/91   11/04/20 133/88       Hemoglobin A1C (%)   Date Value   09/23/2020 13.3 (H)               Have you changed or started any medications since your last visit including any over-the-counter medicines, vitamins, or herbal medicines? no   Have you stopped taking any of your medications? Is so, why? -  no  Are you having any side effects from any of your medications? - no    Have you seen any other physician or provider since your last visit? NEW PATIENT  Have you had any other diagnostic tests since your last visit?  no   Have you been seen in the emergency room and/or had an admission in a hospital since we last saw you?  yes - [facility]     Have you had your annual diabetic retinal (eye) exam? No   (ensure copy of exam is in the chart)    Have you had your routine dental cleaning in the past 6 months? no    Do you have an active MyChart account? If not, what are your barriers? No: pending    Patient Care Team:  Lilia Anaya MD as PCP - General (Family Medicine)  Mariano Baez MD as Consulting Physician (Infectious Diseases)    Medical history Review  Past Medical, Family, and Social History reviewed and does not contribute to the patient presenting condition.     Health Maintenance   Topic Date Due    Pneumococcal 0-64 years Vaccine (1 of 1 - PPSV23) 07/03/1975    Diabetic foot exam  07/03/1979    Diabetic retinal exam  07/03/1979    Lipid screen  07/03/1979    Diabetic microalbuminuria test  07/03/1987    Hepatitis B vaccine (1 of 3 - Risk 3-dose series) 07/03/1988    DTaP/Tdap/Td vaccine (1 - Tdap) 07/03/1988    Shingles Vaccine (1 of 2) 07/03/2019    Colon cancer screen colonoscopy  07/03/2019    Flu vaccine (1) 09/01/2020    A1C test (Diabetic or Prediabetic)  12/23/2020    HIV screen  Completed    Hepatitis A vaccine  Aged Out    Hib vaccine  Aged Out    Meningococcal (ACWY) vaccine Aged Out

## 2020-11-05 NOTE — PATIENT INSTRUCTIONS
Thank you for letting us take care of you today. We hope all your questions were addressed. If a question was overlooked or something else comes to mind after you return home, please contact a member of your Care Team listed below. Your Care Team at [place] [address] is Team #2  Mili Garcia DO (Faculty)  Lindsay Ulloa (Faculty)  Carlito Hyatt MD (Resident)  Dominga Calderon MD (Resident)  Valdez Edge MD (Resident)  Von Beal MD (Resident)  [place] Tomas MD (Resident)  GUIDO Alvarez ,Ivanna Stone ([address] [facility] office)  Kailyn Romero, [address] (Clinical Practice Manager)  [place], [address] [facility] (Clinical Pharmacist)     Office phone number: [phone]    If you need to get in right away due to illness, please be advised we have \"Same Day\" appointments available Monday-Friday. Please call us at [phone] option #3 to schedule your \"Same Day\" appointment. Patient Education        Pneumococcal Polysaccharide Vaccine: What You Need to Know  Why get vaccinated? Pneumococcal polysaccharide vaccine (PPSV23) can prevent pneumococcal disease. Pneumococcal disease refers to any illness caused by pneumococcal bacteria. These bacteria can cause many types of illnesses, including pneumonia, which is an infection of the lungs. Pneumococcal bacteria are one of the most common causes of pneumonia. Besides pneumonia, pneumococcal bacteria can also cause:  · Ear infections,  · Sinus infections  · Meningitis (infection of the tissue covering the brain and spinal cord)  · Bacteremia (bloodstream infection)  Anyone can get pneumococcal disease, but children under 3years of age, people with certain medical conditions, adults 72 years or older, and cigarette smokers are at the highest risk. Most pneumococcal infections are mild. However, some can result in long-term problems, such as brain damage or hearing loss.  Meningitis, bacteremia, and pneumonia caused by pneumococcal disease can be fatal.  PPSV23  PPSV23 protects against 23 types of bacteria that cause pneumococcal disease. PPSV23 is recommended for:  · All adults 72 years or older,  · Anyone 2 years or older with certain medical conditions that can lead to an increased risk for pneumococcal disease. Most people need only one dose of PPSV23. A second dose of PPSV23, and another type of pneumococcal vaccine called PCV13, are recommended for certain high-risk groups. Your health care provider can give you more information. People 65 years or older should get a dose of PPSV23 even if they have already gotten one or more doses of the vaccine before they turned 65. Talk with your health care provider  Tell your vaccine provider if the person getting the vaccine:  · Has had an allergic reaction after a previous dose of PPSV23, or has any severe, life-threatening allergies. In some cases, your health care provider may decide to postpone PPSV23 vaccination to a future visit. People with minor illnesses, such as a cold, may be vaccinated. People who are moderately or severely ill should usually wait until they recover before getting PPSV23. Your health care provider can give you more information. Risks of a vaccine reaction  · Redness or pain where the shot is given, feeling tired, fever, or muscle aches can happen after PPSV23. People sometimes faint after medical procedures, including vaccination. Tell your provider if you feel dizzy or have vision changes or ringing in the ears. As with any medicine, there is a very remote chance of a vaccine causing a severe allergic reaction, other serious injury, or death. What if there is a serious problem? An allergic reaction could occur after the vaccinated person leaves the clinic.  If you see signs of a severe allergic reaction (hives, swelling of the face and throat, difficulty breathing, a fast heartbeat, dizziness, or weakness), call 9-1-1 and get the person to the nearest hospital.  For other signs that concern you, call your health care provider. Adverse reactions should be reported to the Vaccine Adverse Event Reporting System (VAERS). Your health care provider will usually file this report, or you can do it yourself. Visit the VAERS website at www.vaers. hhs.gov at www.vaers. hhs.gov or call 8-356.230.5354. VAERS is only for reporting reactions, and VAERS staff do not give medical advice. How can I learn more? · Ask your health care provider. · Call your local or state health department. · Contact the Centers for Disease Control and Prevention (CDC):  ? Call 8-152.139.5159 (1-800-CDC-INFO) or  ? Visit CDC's website at www.cdc.gov/vaccines  Vaccine Information Statement  PPSV23 Vaccine  10/30/2019  [facility] for Disease Control and Prevention  Many Vaccine Information Statements are available in [place]ish and other languages. See www.immunize.org/vis. Hojas de información Sobre Vacunas están disponibles en español y en muchos otros idiomas. Visite Biju.si. Care instructions adapted under license by [facility] ([facility]). If you have questions about a medical condition or this instruction, always ask your healthcare professional. [address] any warranty or liability for your use of this information. Patient Education        Tdap (Tetanus, Diphtheria, Pertussis) Vaccine: What You Need to Know  Why get vaccinated? Tdap vaccine can prevent tetanus, diphtheria, and pertussis. Diphtheria and pertussis spread from person to person. Tetanus enters the body through cuts or wounds. · TETANUS (T) causes painful stiffening of the muscles. Tetanus can lead to serious health problems, including being unable to open the mouth, having trouble swallowing and breathing, or death. · DIPHTHERIA (D) can lead to difficulty breathing, heart failure, paralysis, or death.   · PERTUSSIS (aP), also known as \"whooping cough,\" can cause uncontrollable, violent coughing which makes it hard to breathe, eat, or drink. Pertussis can be extremely serious in babies and young children, causing pneumonia, convulsions, brain damage, or death. In teens and adults, it can cause weight loss, loss of bladder control, passing out, and rib fractures from severe coughing. Tdap vaccine  Tdap is only for children 7 years and older, adolescents, and adults. Adolescents should receive a single dose of Tdap, preferably at age 6 or 15 years. Pregnant women should get a dose of Tdap during every pregnancy, to protect the  from pertussis. Infants are most at risk for severe, life threatening complications from pertussis. Adults who have never received Tdap should get a dose of Tdap. Also, adults should receive a booster dose every 10 years, or earlier in the case of a severe and dirty wound or burn. Booster doses can be either Tdap or Td (a different vaccine that protects against tetanus and diphtheria but not pertussis). Tdap may be given at the same time as other vaccines. Talk with your health care provider  Tell your vaccine provider if the person getting the vaccine:  · Has had an allergic reaction after a previous dose of any vaccine that protects against tetanus, diphtheria, or pertussis, or has any severe, life threatening allergies. · Has had a coma, decreased level of consciousness, or prolonged seizures within 7 days after a previous dose of any pertussis vaccine (DTP, DTaP, or Tdap). · Has seizures or another nervous system problem. · Has ever had Guillain-Barré Syndrome (also called GBS). · Has had severe pain or swelling after a previous dose of any vaccine that protects against tetanus or diphtheria. In some cases, your health care provider may decide to postpone Tdap vaccination to a future visit. People with minor illnesses, such as a cold, may be vaccinated.  People who are moderately or severely ill should usually wait until they recover before getting Tdap vaccine. Your health care provider can give you more information. Risks of a vaccine reaction  · Pain, redness, or swelling where the shot was given, mild fever, headache, feeling tired, and nausea, vomiting, diarrhea, or stomachache sometimes happen after Tdap vaccine. People sometimes faint after medical procedures, including vaccination. Tell your provider if you feel dizzy or have vision changes or ringing in the ears. As with any medicine, there is a very remote chance of a vaccine causing a severe allergic reaction, other serious injury, or death. What if there is a serious problem? An allergic reaction could occur after the vaccinated person leaves the clinic. If you see signs of a severe allergic reaction (hives, swelling of the face and throat, difficulty breathing, a fast heartbeat, dizziness, or weakness), call 9-1-1 and get the person to the nearest hospital.  For other signs that concern you, call your health care provider. Adverse reactions should be reported to the Vaccine Adverse Event Reporting System (VAERS). Your health care provider will usually file this report, or you can do it yourself. Visit the VAERS website at www.vaers. hhs.gov or call 4-958.655.2118. VAERS is only for reporting reactions, and VAERS staff do not give medical advice. The National Vaccine Injury Compensation Program  The National Vaccine Injury Compensation Program (VICP) is a federal program that was created to compensate people who may have been injured by certain vaccines. Visit the VICP website at www.hrsa.gov/vaccinecompensation or call 5-945.376.2970 to learn about the program and about filing a claim. There is a time limit to file a claim for compensation. How can I learn more? · Ask your health care provider. · Call your local or state health department. · Contact the Centers for Disease Control and Prevention (CDC):  ? Call 1-208.859.7357 (1-203-YVX-INFO) or  ?  Visit CDC's website at www.cdc.gov/vaccines  Vaccine Information Statement (Interim)  Tdap (Tetanus, Diphtheria, Pertussis) Vaccine  04/01/2020  [id number]  Department of Health and Human Services  Centers for Disease Control and Prevention  Many Vaccine Information Statements are available in [place] and other languages. See www.immunize.org/vis. Muchas hojas de información sobre vacunas están disponibles en español y en otros idiomas. Visite www.immunize.org/vis. Care instructions adapted under license by [facility] ([facility]). If you have questions about a medical condition or this instruction, always ask your healthcare professional. [place] [address] any warranty or liability for your use of this information. Patient Education        hepatitis B adult vaccine  Pronunciation:  HEP a LOLA tis B a DULT VAX een  Brand:  Engerix-B, Heplisav-B, Recombivax HB Adult, Recombivax HB Dialysis Formulation  What is the most important information I should know about this vaccine? You should not receive hepatitis B vaccine if you are allergic to yeast.  This vaccine will not protect against hepatitis B if you are already infected with the virus, even if you do not yet show symptoms. What is hepatitis B vaccine? Hepatitis B is a serious disease caused by a virus. Hepatitis causes inflammation of the liver, vomiting, and jaundice (yellowing of the skin or eyes). Hepatitis can lead to liver cancer, cirrhosis, or death. Hepatitis B is spread through blood or bodily fluids, sexual contact, and by sharing items such as a razor, toothbrush, or IV drug needle with an infected person. Hepatitis B can also be passed to a baby during childbirth when the mother is infected. The hepatitis B adult vaccine is used to help prevent this disease in adults. The dialysis form of this vaccine is for adults receiving dialysis.   This vaccine helps your body develop immunity to hepatitis B, but it will not treat an active infection you already have. Vaccination with hepatitis B adult vaccine is recommended for all adults who are at risk of getting hepatitis B. Risk factors include: living with someone infected with hepatitis B virus; having more than one sex partner; men who have sex with men; having sexual contact with infected people; having hepatitis C, chronic liver disease, kidney disease, diabetes, HIV or AIDS; being on dialysis; using intravenous (IV) drugs; living or working in a facility for developmentally disabled people; working in healthcare or public safety and being exposed to blood or body fluids; living or working in a correctional facility; being a victim of sexual abuse or assault; and traveling to areas where hepatitis B is common. Like any vaccine, the hepatitis B vaccine may not provide protection from disease in every person. What should I discuss with my healthcare provider before receiving this vaccine? Hepatitis B vaccine will not protect against infection with hepatitis A, C, and E, or other viruses that affect the liver. It may also not protect against hepatitis B if you are already infected with the virus, even if you do not yet show symptoms. You should not receive this vaccine if you have ever had a life-threatening allergic reaction to any vaccine containing hepatitis B, or if you are allergic to yeast.  If you have any of these other conditions, your vaccine may need to be postponed or not given at all:  · multiple sclerosis;  · kidney disease (or if you are on dialysis);  · a bleeding or blood clotting disorder such as hemophilia or easy bruising;  · weak immune system (caused by disease or by using certain medicine);  · an allergy to latex; or  · a neurologic disorder or disease affecting the brain (or if this was a reaction to a previous vaccine). You can still receive a vaccine if you have a minor cold.  If you have a more severe illness with a fever or any type of infection, your doctor may should not receive a booster vaccine if you had a life-threatening allergic reaction after the first shot. Keep track of any and all side effects you have after receiving this vaccine. When you receive a booster dose, you will need to tell the doctor if the previous shot caused any side effects. Becoming infected with hepatitis B is much more dangerous to your health than receiving this vaccine. However, like any medicine, this vaccine can cause side effects but the risk of serious side effects is extremely low. Call your doctor at once if you have:  · a light-headed feeling, like you might pass out;  · seizure-like muscle movements; or  · fever, swollen glands. Common side effects include:  · headache;  · feeling tired; or  · redness, pain, swelling, or a lump where the shot was given. This is not a complete list of side effects and others may occur. Call your doctor for medical advice about side effects. You may report vaccine side effects to the [address] [place] [address] and [facility] at [phone]. What other drugs will affect hepatitis B vaccine? Other drugs may affect this vaccine, including prescription and over-the-counter medicines, vitamins, and herbal products. Tell your doctor about all your current medicines and any medicine you start or stop using. Where can I get more information? Your doctor or pharmacist can provide more information about this vaccine. Additional information is available from your local health department or the Centers for Disease Control and Prevention. Remember, keep this and all other medicines out of the reach of children, never share your medicines with others, and use this medication only for the indication prescribed. Every effort has been made to ensure that the information provided by [address] is accurate, up-to-date, and complete, but no guarantee is made to that effect. Drug information contained herein may be time sensitive. [employer] information has been compiled for use by healthcare practitioners and consumers in the [place] and therefore [employer] does not warrant that uses outside of the [place] are appropriate, unless specifically indicated otherwise. [facility]'s drug information does not endorse drugs, diagnose patients or recommend therapy. [facility] drug information is an informational resource designed to assist licensed healthcare practitioners in caring for their patients and/or to serve consumers viewing this service as a supplement to, and not a substitute for, the expertise, skill, knowledge and judgment of healthcare practitioners. The absence of a warning for a given drug or drug combination in no way should be construed to indicate that the drug or drug combination is safe, effective or appropriate for any given patient. [facility] does not assume any responsibility for any aspect of healthcare administered with the aid of information [facility] provides. The information contained herein is not intended to cover all possible uses, directions, precautions, warnings, drug interactions, allergic reactions, or adverse effects. If you have questions about the drugs you are taking, check with your doctor, nurse or pharmacist.  Copyright 1064-8109 Vickey Ulrich Bert: 7.01. Revision date: 4/1/2020. Care instructions adapted under license by [facility] ([facility]). If you have questions about a medical condition or this instruction, always ask your healthcare professional. [address] any warranty or liability for your use of this information.

## 2020-11-05 NOTE — PROGRESS NOTES
[facility] - Outpatient Note      Subjective:    Caro Arizmendi is a 45 y.o. male with  has a past medical history of Diabetes mellitus ([address] Presented to the office today for:  Chief Complaint   Patient presents with    New Patient     est care    Discuss Medications     refill on diabetic supplies        HPI    New patient here to establish care:    Diabetic type 2 currently on Lantus 12U daily and sliding scale. Patient has seen significant improvement in A1c, down from 13 to 9.3 yesterday. Patient knows and understands symptoms of hypoglycemia, and we discussed methods of correction. Patient will keep BG log and we will consider adjusting insulin to keep tighter control at next visit. Has gallbladder drain, needs cholecystectomy. Has already been seen by surgery and scheduled for robotic pau. RI surgery risk class I, low risk for surgery. Review of Systems   Constitutional: Negative for chills, diaphoresis and fever. HENT: Negative for congestion, rhinorrhea and sore throat. Respiratory: Negative for cough, shortness of breath and wheezing. Cardiovascular: Negative for chest pain, palpitations and leg swelling. Gastrointestinal: Negative for abdominal pain, diarrhea, nausea and vomiting. Genitourinary: Negative for difficulty urinating and dysuria. Musculoskeletal: Negative for arthralgias, back pain and myalgias. Neurological: Negative for dizziness, light-headedness and headaches.                  The patient has a   Family History   Problem Relation Age of Onset    Diabetes Mother     Diabetes Father     Diabetes Sister     Diabetes Brother     Diabetes Brother     Diabetes Brother        Objective:    /80 (Site: Left Upper Arm, Position: Sitting, Cuff Size: Medium Adult) Comment: machine  Pulse 75   Temp 97 °F (36.1 °C) (Temporal)   Ht 5' 9.02\" (1.753 m)   Wt 216 lb 12.8 oz (98.3 kg)   BMI 32.00 and Plan:    1. Type 2 diabetes mellitus with diabetic neuropathy, with long-term current use of insulin (HCC)  - Continue Lantus 12 U and SSI. Patient will return with blood glucose log at next visit, will consider titration of lantus.   - POCT glycosylated hemoglobin (Hb A1C)  - Microalbumin, Ur; Future  - Alcohol prep pad  -  DIABETES FOOT EXAM    2. Screening for hyperlipidemia    - Lipid Panel; Future    3. Need for prophylactic vaccination against Streptococcus pneumoniae (pneumococcus)    - Pneumococcal polysaccharide vaccine 23-valent PPSV23    4. Need for prophylactic vaccination against diphtheria-tetanus-pertussis (DTP)    - Tdap (age 6y and older) IM (Boostrix)    5. Need for prophylactic vaccination and inoculation against varicella    - zoster recombinant adjuvanted vaccine [facility]) 50 MCG/0.5ML SUSR injection; Inject 0.5 mLs into the muscle once for 1 dose 50 MCG IM then repeat 2-6 months. Dispense: 1 each; Refill: 1    6. Colon cancer screening    - Cologuard (For External Results Only); Future          Requested Prescriptions     Signed Prescriptions Disp Refills    zoster recombinant adjuvanted vaccine (SHINGRIX) 50 MCG/0.5ML SUSR injection 1 each 1     Sig: Inject 0.5 mLs into the muscle once for 1 dose 50 MCG IM then repeat 2-6 months.  blood glucose monitor kit and supplies 1 kit 0     Sig: Dispense sufficient amount for indicated testing frequency plus additional to accommodate PRN testing needs. Dispense all needed supplies to include: monitor, strips, lancing device, lancets, control solutions, alcohol swabs.  Syringe/Needle, Disp, (SYRINGE 3CC/25GX1\") 25G X 1\" 3 ML MISC 120 each 2     Si each by Does not apply route 4 times daily    Lancets MISC 600 each 1     Si each by Does not apply route 4 times daily    blood glucose monitor strips 120 strip 3     Sig: Test 4 times a day & as needed for symptoms of irregular blood glucose.  Dispense sufficient amount for indicated testing frequency plus additional to accommodate PRN testing needs. There are no discontinued medications. Ned received counseling on the following healthy behaviors: nutrition, exercise and medication adherence    Discussed use,benefit, and side effects of prescribed medications. Barriers to medication compliance addressed. All patient questions answered. Pt voiced understanding. Return in about 2 weeks (around 11/19/2020) for Diabetes, med manage. Disclaimer: Some orall of this note was transcribed using voice-recognition software. This may cause typographical errors occasionally. Although all effort is made to fix these errors, please do not hesitate to contact our office if there Lesley Neigh concern with the understanding of this note.

## 2020-11-06 LAB
CREATININE URINE: 102.7 MG/DL (ref 39–259)
MICROALBUMIN/CREAT 24H UR: 120 MG/L
MICROALBUMIN/CREAT UR-RTO: 117 MCG/MG CREAT

## 2020-11-09 ENCOUNTER — TELEPHONE (OUTPATIENT)
Dept: FAMILY MEDICINE CLINIC | Age: 50
End: 2020-11-09

## 2020-11-09 RX ORDER — BLOOD SUGAR DIAGNOSTIC
1 STRIP MISCELLANEOUS 4 TIMES DAILY
Qty: 120 EACH | Refills: 3 | Status: SHIPPED | OUTPATIENT
Start: 2020-11-09 | End: 2021-06-25

## 2020-11-09 NOTE — TELEPHONE ENCOUNTER
Pharmacy need clarification on syringe 25G 1'3ml stated to big for insulin, what would be use for this? Please advise. Thank you, if looking for insulin normal size would be 1ml 5/16 \" 31G according to pharmacy.  Thank you

## 2020-11-13 ENCOUNTER — HOSPITAL ENCOUNTER (OUTPATIENT)
Dept: PREADMISSION TESTING | Age: 50
Setting detail: SPECIMEN
Discharge: HOME | End: 2020-11-17
Payer: MEDICAID

## 2020-11-13 PROCEDURE — U0003 INFECTIOUS AGENT DETECTION BY NUCLEIC ACID (DNA OR RNA); SEVERE ACUTE RESPIRATORY SYNDROME CORONAVIRUS 2 (SARS-COV-2) (CORONAVIRUS DISEASE [COVID-19]), AMPLIFIED PROBE TECHNIQUE, MAKING USE OF HIGH THROUGHPUT TECHNOLOGIES AS DESCRIBED BY CMS-2020-01-R: HCPCS

## 2020-11-14 LAB
SARS-COV-2, RAPID: NORMAL
SARS-COV-2: NORMAL
SARS-COV-2: NOT DETECTED
SOURCE: NORMAL

## 2020-11-17 ENCOUNTER — HOSPITAL ENCOUNTER (OUTPATIENT)
Age: 50
Setting detail: OUTPATIENT SURGERY
Discharge: HOME | End: 2020-11-17
Attending: SURGERY | Admitting: SURGERY
Payer: MEDICAID

## 2020-11-17 ENCOUNTER — ANESTHESIA (OUTPATIENT)
Dept: OPERATING ROOM | Age: 50
End: 2020-11-17
Payer: MEDICAID

## 2020-11-17 ENCOUNTER — ANESTHESIA EVENT (OUTPATIENT)
Dept: OPERATING ROOM | Age: 50
End: 2020-11-17
Payer: MEDICAID

## 2020-11-17 VITALS — OXYGEN SATURATION: 100 % | DIASTOLIC BLOOD PRESSURE: 116 MMHG | TEMPERATURE: 96.5 F | SYSTOLIC BLOOD PRESSURE: 172 MMHG

## 2020-11-17 VITALS
SYSTOLIC BLOOD PRESSURE: 157 MMHG | OXYGEN SATURATION: 97 % | BODY MASS INDEX: 31.1 KG/M2 | DIASTOLIC BLOOD PRESSURE: 95 MMHG | TEMPERATURE: 97.7 F | HEART RATE: 56 BPM | WEIGHT: 210 LBS | HEIGHT: 69 IN | RESPIRATION RATE: 16 BRPM

## 2020-11-17 LAB
EKG ATRIAL RATE: 72 BPM
EKG P AXIS: 25 DEGREES
EKG P-R INTERVAL: 136 MS
EKG Q-T INTERVAL: 416 MS
EKG QRS DURATION: 104 MS
EKG QTC CALCULATION (BAZETT): 455 MS
EKG R AXIS: 21 DEGREES
EKG T AXIS: -3 DEGREES
EKG VENTRICULAR RATE: 72 BPM
GLUCOSE BLD-MCNC: 222 MG/DL (ref 75–110)
GLUCOSE BLD-MCNC: 287 MG/DL (ref 75–110)

## 2020-11-17 PROCEDURE — S2900 ROBOTIC SURGICAL SYSTEM: HCPCS | Performed by: SURGERY

## 2020-11-17 PROCEDURE — 3600000009 HC SURGERY ROBOT BASE: Performed by: SURGERY

## 2020-11-17 PROCEDURE — 7100000041 HC SPAR PHASE II RECOVERY - ADDTL 15 MIN: Performed by: SURGERY

## 2020-11-17 PROCEDURE — 3600000019 HC SURGERY ROBOT ADDTL 15MIN: Performed by: SURGERY

## 2020-11-17 PROCEDURE — C1760 CLOSURE DEV, VASC: HCPCS | Performed by: SURGERY

## 2020-11-17 PROCEDURE — 2500000003 HC RX 250 WO HCPCS: Performed by: NURSE ANESTHETIST, CERTIFIED REGISTERED

## 2020-11-17 PROCEDURE — 3700000001 HC ADD 15 MINUTES (ANESTHESIA): Performed by: SURGERY

## 2020-11-17 PROCEDURE — 2709999900 HC NON-CHARGEABLE SUPPLY: Performed by: SURGERY

## 2020-11-17 PROCEDURE — 93005 ELECTROCARDIOGRAM TRACING: CPT | Performed by: ANESTHESIOLOGY

## 2020-11-17 PROCEDURE — 82947 ASSAY GLUCOSE BLOOD QUANT: CPT

## 2020-11-17 PROCEDURE — 2580000003 HC RX 258: Performed by: SURGERY

## 2020-11-17 PROCEDURE — 6360000002 HC RX W HCPCS: Performed by: NURSE ANESTHETIST, CERTIFIED REGISTERED

## 2020-11-17 PROCEDURE — 7100000000 HC PACU RECOVERY - FIRST 15 MIN: Performed by: SURGERY

## 2020-11-17 PROCEDURE — 93010 ELECTROCARDIOGRAM REPORT: CPT | Performed by: INTERNAL MEDICINE

## 2020-11-17 PROCEDURE — 76942 ECHO GUIDE FOR BIOPSY: CPT

## 2020-11-17 PROCEDURE — 3700000000 HC ANESTHESIA ATTENDED CARE: Performed by: SURGERY

## 2020-11-17 PROCEDURE — 6360000002 HC RX W HCPCS: Performed by: ANESTHESIOLOGY

## 2020-11-17 PROCEDURE — 2500000003 HC RX 250 WO HCPCS: Performed by: ANESTHESIOLOGY

## 2020-11-17 PROCEDURE — 88304 TISSUE EXAM BY PATHOLOGIST: CPT

## 2020-11-17 PROCEDURE — 6360000002 HC RX W HCPCS

## 2020-11-17 PROCEDURE — 64488 TAP BLOCK BI INJECTION: CPT | Performed by: ANESTHESIOLOGY

## 2020-11-17 PROCEDURE — 2720000010 HC SURG SUPPLY STERILE: Performed by: SURGERY

## 2020-11-17 PROCEDURE — 7100000040 HC SPAR PHASE II RECOVERY - FIRST 15 MIN: Performed by: SURGERY

## 2020-11-17 PROCEDURE — 6370000000 HC RX 637 (ALT 250 FOR IP): Performed by: ANESTHESIOLOGY

## 2020-11-17 PROCEDURE — 7100000001 HC PACU RECOVERY - ADDTL 15 MIN: Performed by: SURGERY

## 2020-11-17 RX ORDER — SODIUM CHLORIDE, SODIUM LACTATE, POTASSIUM CHLORIDE, CALCIUM CHLORIDE 600; 310; 30; 20 MG/100ML; MG/100ML; MG/100ML; MG/100ML
INJECTION, SOLUTION INTRAVENOUS CONTINUOUS
Status: DISCONTINUED | OUTPATIENT
Start: 2020-11-17 | End: 2020-11-17 | Stop reason: HOSPADM

## 2020-11-17 RX ORDER — ONDANSETRON 2 MG/ML
INJECTION INTRAMUSCULAR; INTRAVENOUS PRN
Status: DISCONTINUED | OUTPATIENT
Start: 2020-11-17 | End: 2020-11-17 | Stop reason: SDUPTHER

## 2020-11-17 RX ORDER — DOCUSATE SODIUM 100 MG/1
100 CAPSULE, LIQUID FILLED ORAL 2 TIMES DAILY
Qty: 30 CAPSULE | Refills: 1 | Status: SHIPPED | OUTPATIENT
Start: 2020-11-17

## 2020-11-17 RX ORDER — FENTANYL CITRATE 50 UG/ML
100 INJECTION, SOLUTION INTRAMUSCULAR; INTRAVENOUS ONCE
Status: COMPLETED | OUTPATIENT
Start: 2020-11-17 | End: 2020-11-17

## 2020-11-17 RX ORDER — PROPOFOL 10 MG/ML
INJECTION, EMULSION INTRAVENOUS PRN
Status: DISCONTINUED | OUTPATIENT
Start: 2020-11-17 | End: 2020-11-17 | Stop reason: SDUPTHER

## 2020-11-17 RX ORDER — MIDAZOLAM HYDROCHLORIDE 1 MG/ML
2 INJECTION INTRAMUSCULAR; INTRAVENOUS ONCE
Status: COMPLETED | OUTPATIENT
Start: 2020-11-17 | End: 2020-11-17

## 2020-11-17 RX ORDER — MAGNESIUM HYDROXIDE 1200 MG/15ML
LIQUID ORAL CONTINUOUS PRN
Status: COMPLETED | OUTPATIENT
Start: 2020-11-17 | End: 2020-11-17

## 2020-11-17 RX ORDER — NEOSTIGMINE METHYLSULFATE 5 MG/5 ML
SYRINGE (ML) INTRAVENOUS PRN
Status: DISCONTINUED | OUTPATIENT
Start: 2020-11-17 | End: 2020-11-17 | Stop reason: SDUPTHER

## 2020-11-17 RX ORDER — GLYCOPYRROLATE 1 MG/5 ML
SYRINGE (ML) INTRAVENOUS PRN
Status: DISCONTINUED | OUTPATIENT
Start: 2020-11-17 | End: 2020-11-17 | Stop reason: SDUPTHER

## 2020-11-17 RX ORDER — INDOCYANINE GREEN AND WATER 25 MG
KIT INJECTION PRN
Status: DISCONTINUED | OUTPATIENT
Start: 2020-11-17 | End: 2020-11-17 | Stop reason: SDUPTHER

## 2020-11-17 RX ORDER — BUPIVACAINE HYDROCHLORIDE 5 MG/ML
30 INJECTION, SOLUTION EPIDURAL; INTRACAUDAL ONCE
Status: COMPLETED | OUTPATIENT
Start: 2020-11-17 | End: 2020-11-17

## 2020-11-17 RX ORDER — LIDOCAINE HYDROCHLORIDE 10 MG/ML
INJECTION, SOLUTION EPIDURAL; INFILTRATION; INTRACAUDAL; PERINEURAL PRN
Status: DISCONTINUED | OUTPATIENT
Start: 2020-11-17 | End: 2020-11-17 | Stop reason: SDUPTHER

## 2020-11-17 RX ORDER — DEXAMETHASONE SODIUM PHOSPHATE 4 MG/ML
INJECTION, SOLUTION INTRA-ARTICULAR; INTRALESIONAL; INTRAMUSCULAR; INTRAVENOUS; SOFT TISSUE PRN
Status: DISCONTINUED | OUTPATIENT
Start: 2020-11-17 | End: 2020-11-17 | Stop reason: SDUPTHER

## 2020-11-17 RX ORDER — OXYCODONE HYDROCHLORIDE AND ACETAMINOPHEN 5; 325 MG/1; MG/1
1 TABLET ORAL EVERY 6 HOURS PRN
Qty: 20 TABLET | Refills: 0 | Status: SHIPPED | OUTPATIENT
Start: 2020-11-17 | End: 2020-11-22

## 2020-11-17 RX ORDER — HYDRALAZINE HYDROCHLORIDE 20 MG/ML
5 INJECTION INTRAMUSCULAR; INTRAVENOUS EVERY 5 MIN PRN
Status: DISCONTINUED | OUTPATIENT
Start: 2020-11-17 | End: 2020-11-17

## 2020-11-17 RX ORDER — BUPIVACAINE HYDROCHLORIDE 5 MG/ML
INJECTION, SOLUTION EPIDURAL; INTRACAUDAL
Status: COMPLETED | OUTPATIENT
Start: 2020-11-17 | End: 2020-11-17

## 2020-11-17 RX ORDER — METHOCARBAMOL 500 MG/1
750 TABLET, FILM COATED ORAL 4 TIMES DAILY
Qty: 60 TABLET | Refills: 0 | Status: CANCELLED | OUTPATIENT
Start: 2020-11-17 | End: 2020-11-27

## 2020-11-17 RX ORDER — FENTANYL CITRATE 50 UG/ML
INJECTION, SOLUTION INTRAMUSCULAR; INTRAVENOUS PRN
Status: DISCONTINUED | OUTPATIENT
Start: 2020-11-17 | End: 2020-11-17 | Stop reason: SDUPTHER

## 2020-11-17 RX ORDER — ESMOLOL HYDROCHLORIDE 10 MG/ML
INJECTION INTRAVENOUS PRN
Status: DISCONTINUED | OUTPATIENT
Start: 2020-11-17 | End: 2020-11-17 | Stop reason: SDUPTHER

## 2020-11-17 RX ORDER — ROCURONIUM BROMIDE 10 MG/ML
INJECTION, SOLUTION INTRAVENOUS PRN
Status: DISCONTINUED | OUTPATIENT
Start: 2020-11-17 | End: 2020-11-17 | Stop reason: SDUPTHER

## 2020-11-17 RX ORDER — OXYCODONE HYDROCHLORIDE AND ACETAMINOPHEN 5; 325 MG/1; MG/1
1 TABLET ORAL
Status: COMPLETED | OUTPATIENT
Start: 2020-11-17 | End: 2020-11-17

## 2020-11-17 RX ADMIN — BUPIVACAINE HYDROCHLORIDE 40 ML: 5 INJECTION, SOLUTION EPIDURAL; INTRACAUDAL; PERINEURAL at 10:20

## 2020-11-17 RX ADMIN — Medication 4 MG: at 11:59

## 2020-11-17 RX ADMIN — FENTANYL CITRATE 100 MCG: 50 INJECTION, SOLUTION INTRAMUSCULAR; INTRAVENOUS at 10:11

## 2020-11-17 RX ADMIN — INDOCYANINE GREEN AND WATER 5 MG: KIT at 11:09

## 2020-11-17 RX ADMIN — HYDROMORPHONE HYDROCHLORIDE 0.25 MG: 1 INJECTION, SOLUTION INTRAMUSCULAR; INTRAVENOUS; SUBCUTANEOUS at 13:25

## 2020-11-17 RX ADMIN — PROPOFOL 200 MG: 10 INJECTION, EMULSION INTRAVENOUS at 10:31

## 2020-11-17 RX ADMIN — SODIUM CHLORIDE, POTASSIUM CHLORIDE, SODIUM LACTATE AND CALCIUM CHLORIDE: 600; 310; 30; 20 INJECTION, SOLUTION INTRAVENOUS at 11:44

## 2020-11-17 RX ADMIN — HYDROMORPHONE HYDROCHLORIDE 0.25 MG: 1 INJECTION, SOLUTION INTRAMUSCULAR; INTRAVENOUS; SUBCUTANEOUS at 13:20

## 2020-11-17 RX ADMIN — HYDROMORPHONE HYDROCHLORIDE 0.5 MG: 1 INJECTION, SOLUTION INTRAMUSCULAR; INTRAVENOUS; SUBCUTANEOUS at 12:41

## 2020-11-17 RX ADMIN — HYDROMORPHONE HYDROCHLORIDE 0.25 MG: 1 INJECTION, SOLUTION INTRAMUSCULAR; INTRAVENOUS; SUBCUTANEOUS at 13:15

## 2020-11-17 RX ADMIN — ONDANSETRON 4 MG: 2 INJECTION INTRAMUSCULAR; INTRAVENOUS at 11:40

## 2020-11-17 RX ADMIN — MIDAZOLAM HYDROCHLORIDE 2 MG: 1 INJECTION, SOLUTION INTRAMUSCULAR; INTRAVENOUS at 10:11

## 2020-11-17 RX ADMIN — ESMOLOL HYDROCHLORIDE 10 MG: 10 INJECTION, SOLUTION INTRAVENOUS at 10:55

## 2020-11-17 RX ADMIN — LIDOCAINE HYDROCHLORIDE 50 MG: 10 INJECTION, SOLUTION EPIDURAL; INFILTRATION; INTRACAUDAL; PERINEURAL at 10:31

## 2020-11-17 RX ADMIN — HYDROMORPHONE HYDROCHLORIDE 0.5 MG: 1 INJECTION, SOLUTION INTRAMUSCULAR; INTRAVENOUS; SUBCUTANEOUS at 12:55

## 2020-11-17 RX ADMIN — ROCURONIUM BROMIDE 50 MG: 10 INJECTION INTRAVENOUS at 10:31

## 2020-11-17 RX ADMIN — FENTANYL CITRATE 50 MCG: 50 INJECTION, SOLUTION INTRAMUSCULAR; INTRAVENOUS at 12:21

## 2020-11-17 RX ADMIN — DEXAMETHASONE SODIUM PHOSPHATE 4 MG: 4 INJECTION, SOLUTION INTRAMUSCULAR; INTRAVENOUS at 10:44

## 2020-11-17 RX ADMIN — ESMOLOL HYDROCHLORIDE 10 MG: 10 INJECTION, SOLUTION INTRAVENOUS at 11:59

## 2020-11-17 RX ADMIN — FENTANYL CITRATE 50 MCG: 50 INJECTION, SOLUTION INTRAMUSCULAR; INTRAVENOUS at 12:28

## 2020-11-17 RX ADMIN — Medication 0.8 MG: at 11:59

## 2020-11-17 RX ADMIN — SODIUM CHLORIDE, POTASSIUM CHLORIDE, SODIUM LACTATE AND CALCIUM CHLORIDE: 600; 310; 30; 20 INJECTION, SOLUTION INTRAVENOUS at 09:47

## 2020-11-17 RX ADMIN — HYDROMORPHONE HYDROCHLORIDE 0.25 MG: 1 INJECTION, SOLUTION INTRAMUSCULAR; INTRAVENOUS; SUBCUTANEOUS at 13:35

## 2020-11-17 RX ADMIN — FENTANYL CITRATE 50 MCG: 50 INJECTION, SOLUTION INTRAMUSCULAR; INTRAVENOUS at 10:44

## 2020-11-17 RX ADMIN — OXYCODONE HYDROCHLORIDE AND ACETAMINOPHEN 1 TABLET: 5; 325 TABLET ORAL at 14:20

## 2020-11-17 RX ADMIN — Medication 40 ML: at 10:15

## 2020-11-17 ASSESSMENT — PULMONARY FUNCTION TESTS
PIF_VALUE: 21
PIF_VALUE: 0
PIF_VALUE: 23
PIF_VALUE: 18
PIF_VALUE: 27
PIF_VALUE: 17
PIF_VALUE: 22
PIF_VALUE: 14
PIF_VALUE: 16
PIF_VALUE: 18
PIF_VALUE: 23
PIF_VALUE: 23
PIF_VALUE: 18
PIF_VALUE: 19
PIF_VALUE: 17
PIF_VALUE: 23
PIF_VALUE: 23
PIF_VALUE: 21
PIF_VALUE: 18
PIF_VALUE: 18
PIF_VALUE: 23
PIF_VALUE: 0
PIF_VALUE: 23
PIF_VALUE: 18
PIF_VALUE: 0
PIF_VALUE: 17
PIF_VALUE: 22
PIF_VALUE: 21
PIF_VALUE: 16
PIF_VALUE: 3
PIF_VALUE: 20
PIF_VALUE: 23
PIF_VALUE: 24
PIF_VALUE: 19
PIF_VALUE: 17
PIF_VALUE: 23
PIF_VALUE: 18
PIF_VALUE: 19
PIF_VALUE: 23
PIF_VALUE: 20
PIF_VALUE: 22
PIF_VALUE: 17
PIF_VALUE: 17
PIF_VALUE: 21
PIF_VALUE: 23
PIF_VALUE: 21
PIF_VALUE: 18
PIF_VALUE: 17
PIF_VALUE: 22
PIF_VALUE: 23
PIF_VALUE: 17
PIF_VALUE: 23
PIF_VALUE: 22
PIF_VALUE: 19
PIF_VALUE: 23
PIF_VALUE: 3
PIF_VALUE: 0
PIF_VALUE: 17
PIF_VALUE: 0
PIF_VALUE: 23
PIF_VALUE: 22
PIF_VALUE: 23
PIF_VALUE: 21
PIF_VALUE: 23
PIF_VALUE: 23
PIF_VALUE: 18
PIF_VALUE: 2
PIF_VALUE: 18
PIF_VALUE: 18
PIF_VALUE: 21
PIF_VALUE: 18
PIF_VALUE: 22
PIF_VALUE: 3
PIF_VALUE: 16
PIF_VALUE: 28
PIF_VALUE: 19
PIF_VALUE: 17
PIF_VALUE: 1
PIF_VALUE: 16
PIF_VALUE: 23
PIF_VALUE: 16
PIF_VALUE: 22
PIF_VALUE: 23
PIF_VALUE: 23
PIF_VALUE: 17
PIF_VALUE: 16
PIF_VALUE: 23
PIF_VALUE: 16
PIF_VALUE: 17
PIF_VALUE: 23
PIF_VALUE: 19
PIF_VALUE: 23
PIF_VALUE: 17
PIF_VALUE: 0
PIF_VALUE: 21
PIF_VALUE: 23
PIF_VALUE: 17
PIF_VALUE: 23
PIF_VALUE: 22
PIF_VALUE: 17
PIF_VALUE: 18
PIF_VALUE: 23
PIF_VALUE: 21
PIF_VALUE: 23
PIF_VALUE: 19
PIF_VALUE: 17
PIF_VALUE: 0
PIF_VALUE: 23
PIF_VALUE: 23
PIF_VALUE: 16
PIF_VALUE: 22

## 2020-11-17 ASSESSMENT — PAIN SCALES - GENERAL
PAINLEVEL_OUTOF10: 6
PAINLEVEL_OUTOF10: 6
PAINLEVEL_OUTOF10: 5
PAINLEVEL_OUTOF10: 5
PAINLEVEL_OUTOF10: 6
PAINLEVEL_OUTOF10: 5
PAINLEVEL_OUTOF10: 5
PAINLEVEL_OUTOF10: 8
PAINLEVEL_OUTOF10: 6
PAINLEVEL_OUTOF10: 4
PAINLEVEL_OUTOF10: 7
PAINLEVEL_OUTOF10: 5

## 2020-11-17 ASSESSMENT — PAIN DESCRIPTION - DESCRIPTORS
DESCRIPTORS: ACHING
DESCRIPTORS: STABBING

## 2020-11-17 ASSESSMENT — PAIN SCALES - WONG BAKER
WONGBAKER_NUMERICALRESPONSE: 8
WONGBAKER_NUMERICALRESPONSE: 8

## 2020-11-17 ASSESSMENT — PAIN DESCRIPTION - LOCATION
LOCATION: ABDOMEN

## 2020-11-17 ASSESSMENT — PAIN DESCRIPTION - PAIN TYPE: TYPE: SURGICAL PAIN

## 2020-11-17 ASSESSMENT — PAIN DESCRIPTION - ORIENTATION
ORIENTATION: UPPER;RIGHT
ORIENTATION: UPPER;RIGHT
ORIENTATION: RIGHT;UPPER
ORIENTATION: UPPER;RIGHT
ORIENTATION: RIGHT;UPPER
ORIENTATION: UPPER;RIGHT

## 2020-11-17 ASSESSMENT — PAIN - FUNCTIONAL ASSESSMENT: PAIN_FUNCTIONAL_ASSESSMENT: 0-10

## 2020-11-17 NOTE — H&P
History and Physical    Pt Name: Grayson Mendiola  MRN: [id number]  YOB: 1970  Date of evaluation: 11/17/2020  Primary Care Physician: Haylee Newman MD    SUBJECTIVE:   History of Chief Complaint:    Grayson Mendiola is a 45 y.o. male who is scheduled today for XI LAPAROSCOPIC ROBOTIC CHOLECYSTECTOMY, FIREFLY, POSSIBLE OPEN. He is s/p IR pau tube on 9/23/20. He denies abdominal pain today. Allergies  is allergic to sulfa antibiotics. Medications  Prior to Admission medications    Medication Sig Start Date End Date Taking? Authorizing Provider   insulin glargine (LANTUS) 100 UNIT/ML injection vial Inject 12 Units into the skin 2 times daily    Yes Historical Provider, MD   insulin lispro (HUMALOG) 100 UNIT/ML injection vial Inject 2-10 Units into the skin 2 times daily as needed (per sliding scale) States scale starts at blood sugar of 150   Yes Historical Provider, MD   ibuprofen (ADVIL;MOTRIN) 800 MG tablet Take 800 mg by mouth every 8 hours as needed for Pain   Yes Historical Provider, MD   metFORMIN (GLUCOPHAGE) 500 MG tablet Take 500 mg by mouth 2 times daily (with meals)   Yes Historical Provider, MD   Insulin Syringe-Needle U-100 ([employer] INSULIN SYRINGE) 31G X 5/16\" 1 ML MISC 1 each by Does not apply route 4 times daily 11/9/20   Haylee Newman MD   blood glucose monitor kit and supplies Dispense sufficient amount for indicated testing frequency plus additional to accommodate PRN testing needs. Dispense all needed supplies to include: monitor, strips, lancing device, lancets, control solutions, alcohol swabs. 11/5/20   Haylee Newman MD   Syringe/Needle, Disp, (SYRINGE 3CC/25GX1\") 25G X 1\" 3 ML MISC 1 each by Does not apply route 4 times daily 11/5/20   Haylee Newman MD   Lancets MISC 1 each by Does not apply route 4 times daily 11/5/20   Haylee Newman MD   blood glucose monitor strips Test 4 times a day & as needed for symptoms of irregular blood glucose.  Dispense sufficient amount for indicated testing frequency plus additional to accommodate PRN testing needs. 20   Oxana Chiang MD   albuterol sulfate HFA (VENTOLIN HFA) 108 (90 Base) MCG/ACT inhaler Inhale 2 puffs into the lungs every 6 hours as needed for Wheezing    Historical Provider, MD     Past Medical History    has a past medical history of Cholelithiasis, COVID-19, Diabetes mellitus ([facility] [address] Lung abscess ([facility] [address] MRSA (methicillin resistant staph aureus) culture positive, Pneumonia, RUQ pain, and Snores. Past Surgical History   has a past surgical history that includes IR CHOLECYSTOSTOMY PERCUTANEOUS COMPLETE (2020). Social History   reports that he has never smoked. He has never used smokeless tobacco.   reports previous alcohol use. reports previous drug use. Marital Status single  Children   Occupation full time   Family History  Family Status   Relation Name Status    Mother      Father  Alive    Sister  Alive    Brother  Alive    Brother     Viky Arroyo Brother       family history includes Diabetes in his brother, brother, brother, father, mother, and sister. OBJECTIVE:   VITALS:  height is 5' 9\" (1.753 m) and weight is 210 lb (95.3 kg). His temporal temperature is 96.8 °F (36 °C). His blood pressure is 151/98 (abnormal) and his pulse is 71. His respiration is 18 and oxygen saturation is 97%. CONSTITUTIONAL:Alert and orientated to person, place and time. No acute distress. Friendly. Very pleasant. SKIN:  Warm & dry, no rashes on exposed skin, tattoos,   HEENT: HEAD: Normocephalic, atraumatic        EYES:  PERRL, EOMs intact, conjunctiva clear      EARS:  Equal bilaterally, no edema or thickening, skin is intact without lumps or lesions. No discharge.       NOSE:  Nares patent, septum midline, no rhinorrhea      MOUTH/THROAT:  Mucous membranes moist, tongue is pink, uvula midline, teeth appear to be intact  NECK:  Supple, no lymphadenopathy, full ROM  LUNGS: Respirations even and non-labored. Clear to auscultation bilaterally, no wheezes/rales/rhonchi   CARDIOVASCULAR: regular rate and rhythm, no murmurs/rubs/gallops   ABDOMEN: soft, non-tender, non-distended, bowel sounds active x 4  Right pau tube intact. MUSCULOSKELETAL: Full ROM bilateral upper extremities, Full ROM bilateral lower extremities. Strength of 5/5 bilateral upper extremities. Strength 5/5 bilateral lower extremities. VASCULAR:  Brisk cap refill bilateral fingers. Radial pulses are intact, 2+ bilaterally. Dorsalis pedis pulse 2+ bilaterally. No edema or varicosities bilateral lower extremities  NEUROLOGIC: CN II-XII are grossly intact. Gait not assessed. IMPRESSIONS:   Cholelithiasis, cholecystitis       Diagnosis Date    Cholelithiasis     and cholecystitis    COVID-19 7/2020-8/2020    hospitalized at [facility] Diabetes mellitus [facility])     Lung abscess ([address] 09/2020    seeing ID, see note 11/4/2020    MRSA (methicillin resistant staph aureus) culture positive     surveillance cultures, nares 9/21/2020 at [facility] Pneumonia 09/2020    at [address] [place] [address] Dr pain 2020    \" gall stones \", has drain in place currently    Snores     states had negative sleep study   1.    PLANS:   XI LAPAROSCOPIC ROBOTIC CHOLECYSTECTOMY, FIREFLY, POSSIBLE OPEN       ZAINAB  PANTOJA APRN-CNP  Electronically signed 11/17/2020 at 9:58 AM

## 2020-11-17 NOTE — ANESTHESIA PRE PROCEDURE
Department of Anesthesiology  Preprocedure Note       Name:  Charles Martines   Age:  45 y.o.  :  1970                                          MRN:  [id number]         Date:  2020      Surgeon: Nargis Mccurdy):  Lele Oscar IV, DO    Procedure: Procedure(s):  XI LAPAROSCOPIC ROBOTIC CHOLECYSTECTOMY, FIREFLY, POSSIBLE OPEN    Medications prior to admission:   Prior to Admission medications    Medication Sig Start Date End Date Taking? Authorizing Provider   insulin glargine (LANTUS) 100 UNIT/ML injection vial Inject 12 Units into the skin 2 times daily    Yes Historical Provider, MD   insulin lispro (HUMALOG) 100 UNIT/ML injection vial Inject 2-10 Units into the skin 2 times daily as needed (per sliding scale) States scale starts at blood sugar of 150   Yes Historical Provider, MD   ibuprofen (ADVIL;MOTRIN) 800 MG tablet Take 800 mg by mouth every 8 hours as needed for Pain   Yes Historical Provider, MD   metFORMIN (GLUCOPHAGE) 500 MG tablet Take 500 mg by mouth 2 times daily (with meals)   Yes Historical Provider, MD   Insulin Syringe-Needle U-100 ([employer] INSULIN SYRINGE) 31G X 5/16\" 1 ML MISC 1 each by Does not apply route 4 times daily 20   Alex Stewart MD   blood glucose monitor kit and supplies Dispense sufficient amount for indicated testing frequency plus additional to accommodate PRN testing needs. Dispense all needed supplies to include: monitor, strips, lancing device, lancets, control solutions, alcohol swabs. 20   Alex Stewart MD   Syringe/Needle, Disp, (SYRINGE 3CC/25GX1\") 25G X 1\" 3 ML MISC 1 each by Does not apply route 4 times daily 20   Alex Stewart MD   Lancets MISC 1 each by Does not apply route 4 times daily 20   Alex Stewart MD   blood glucose monitor strips Test 4 times a day & as needed for symptoms of irregular blood glucose. Dispense sufficient amount for indicated testing frequency plus additional to accommodate PRN testing needs.  20 Huel Severe, MD   albuterol sulfate HFA (VENTOLIN HFA) 108 (90 Base) MCG/ACT inhaler Inhale 2 puffs into the lungs every 6 hours as needed for Wheezing    Historical Provider, MD       Current medications:    Current Facility-Administered Medications   Medication Dose Route Frequency Provider Last Rate Last Dose    lactated ringers infusion   Intravenous Continuous Horace William Canos IV, DO           Allergies:     Allergies   Allergen Reactions    Sulfa Antibiotics Anaphylaxis and Hives       Problem List:    Patient Active Problem List   Diagnosis Code    Abscess of lower lobe of right lung with pneumonia ([facility] [address] J85.1    Acute cholecystitis K81.0    Multifocal pneumonia J18.9    Obesity due to excess calories E66.09    Diabetes mellitus type 2 in obese ([facility] [address] E11.69, E66.9    At high risk for tuberculosis infection Z91.89    Trigger thumb of left hand M65.312       Past Medical History:        Diagnosis Date    COVID-19 7/2020-8/2020    hospitalized at [address] [facility] Diabetes mellitus ([facility] [address]     Lung abscess ([facility] [address] 09/2020    seeing ID, see note 11/4/2020    MRSA (methicillin resistant staph aureus) culture positive     surveillance cultures, nares 9/21/2020 at [facility] Pneumonia 09/2020    at [address] [place] [address] Dr pain 2020    \" gall stones \", has drain in place currently    Snores     states had negative sleep study       Past Surgical History:        Procedure Laterality Date    IR CHOLECYSTOSTOMY PERCUTANEOUS COMPLETE  9/23/2020    IR CHOLECYSTOSTOMY PERCUTANEOUS COMPLETE 9/23/2020 Amanda Sr MD [facility]       Social History:    Social History     Tobacco Use    Smoking status: Never Smoker    Smokeless tobacco: Never Used   Substance Use Topics    Alcohol use: Not Currently                                Counseling given: Not Answered      Vital Signs (Current):   Vitals:    11/13/20 1455   Weight: 210 lb (95.3 kg)   Height: 5' 9\" (1.753 m) BP Readings from Last 3 Encounters:   11/05/20 134/80   11/04/20 (!) 142/91   11/04/20 133/88       NPO Status:                                                                                 BMI:   Wt Readings from Last 3 Encounters:   11/13/20 210 lb (95.3 kg)   11/05/20 216 lb 12.8 oz (98.3 kg)   11/04/20 219 lb 12.8 oz (99.7 kg)     Body mass index is 31.01 kg/m². CBC:   Lab Results   Component Value Date    WBC 6.2 10/13/2020    RBC 4.23 10/13/2020    HGB 12.6 10/13/2020    HCT 40.0 10/13/2020    MCV 94.6 10/13/2020    RDW 13.2 10/13/2020     10/13/2020       CMP:   Lab Results   Component Value Date     10/13/2020    K 4.2 10/13/2020     10/13/2020    CO2 21 10/13/2020    BUN 14 10/13/2020    CREATININE 1.11 10/13/2020    GFRAA >60 10/13/2020    LABGLOM >60 10/13/2020    GLUCOSE 230 10/13/2020    PROT 7.9 10/13/2020    CALCIUM 9.2 10/13/2020    BILITOT 0.69 10/13/2020    ALKPHOS 71 10/13/2020    AST 15 10/13/2020    ALT 8 10/13/2020       POC Tests: No results for input(s): POCGLU, POCNA, POCK, POCCL, POCBUN, POCHEMO, POCHCT in the last 72 hours.     Coags:   Lab Results   Component Value Date    PROTIME 15.6 09/23/2020    INR 1.3 09/23/2020    APTT 41.0 09/23/2020       HCG (If Applicable): No results found for: PREGTESTUR, PREGSERUM, HCG, HCGQUANT     ABGs: No results found for: PHART, PO2ART, XRF8NPA, GOR1EVW, BEART, Z3SBMFTU     Type & Screen (If Applicable):  No results found for: LABABO, LABRH    Drug/Infectious Status (If Applicable):  No results found for: HIV, HEPCAB    COVID-19 Screening (If Applicable):   Lab Results   Component Value Date    COVID19 Not Detected 11/13/2020         Anesthesia Evaluation  Patient summary reviewed and Nursing notes reviewed no history of anesthetic complications:   Airway: Mallampati: II  TM distance: >3 FB   Neck ROM: full  Mouth opening: > = 3 FB Dental: normal exam         Pulmonary:normal exam    (+) pneumonia: Cardiovascular:  Exercise tolerance: good (>4 METS),       (-) past MI, CAD, CABG/stent, dysrhythmias,  angina,  CHF and orthopnea      Rhythm: regular  Rate: normal           Beta Blocker:  Not on Beta Blocker         Neuro/Psych:   Negative Neuro/Psych ROS              GI/Hepatic/Renal: Neg GI/Hepatic/Renal ROS            Endo/Other:    (+) DiabetesType I DM, using insulin, . Abdominal:           Vascular:                                        Anesthesia Plan      general and regional     ASA 3       Induction: intravenous. MIPS: Postoperative opioids intended and Prophylactic antiemetics administered. Anesthetic plan and risks discussed with patient.       Plan discussed with CRNA and surgical team.                  Niru Barrios MD   11/17/2020

## 2020-11-17 NOTE — PROGRESS NOTES
Notified Dr. El Garcia hydralazine is on back order.    Orders to treat pt pain level and continue to monitor BP/HR

## 2020-11-17 NOTE — PROGRESS NOTES
[id number] Dr John Paulino to the bedside, time out performed, Pt monitored, 02, Bil tap  nerve block completed using Bupivacaine, 0.5% 20 ml to each side, vss,  pt tolerated procedure well,  Site CDI, (see charting)  Versed Given:  2 mg  Fentanyl  100 mcg, pt denies co pain or discomfort, no family here with patient.

## 2020-11-17 NOTE — ANESTHESIA POSTPROCEDURE EVALUATION
Department of Anesthesiology  Postprocedure Note    Patient: Shannan Turk  MRN: [id number]  [place]: 1970  Date of evaluation: 11/17/2020  Time:  2:05 PM     Procedure Summary     Date:  11/17/20 Room / Location:  [address]    Anesthesia Start:  1024 Anesthesia Stop:  1233    Procedure:  XI LAPAROSCOPIC ROBOTIC CHOLECYSTECTOMY, FIREFLY, POSSIBLE OPEN (N/A Abdomen) Diagnosis:  (CHOLELITHIASIS, CHOLECYSTITIS)    Surgeon:  Cara Oscar IV, DO Responsible Provider:  Tiffani Tavares MD    Anesthesia Type:  general, regional ASA Status:  3          Anesthesia Type: general, regional    Reese Phase I: Reese Score: 10    Reese Phase II: Reese Score: 10    Last vitals: Reviewed and per EMR flowsheets.        Anesthesia Post Evaluation    Patient location during evaluation: PACU  Patient participation: complete - patient participated  Level of consciousness: awake and alert  Pain score: 3  Airway patency: patent  Nausea & Vomiting: no nausea and no vomiting  Complications: no  Cardiovascular status: hemodynamically stable  Respiratory status: room air  Hydration status: euvolemic

## 2020-11-17 NOTE — ANESTHESIA PROCEDURE NOTES
Peripheral Block    Patient location during procedure: pre-op  Start time: 11/17/2020 10:15 AM  End time: 11/17/2020 10:18 AM  Staffing  Anesthesiologist: Mehul Reyna MD  Performed: anesthesiologist   Preanesthetic Checklist  Completed: patient identified, site marked, surgical consent, pre-op evaluation, timeout performed, IV checked, risks and benefits discussed, monitors and equipment checked, anesthesia consent given, oxygen available and patient being monitored  Peripheral Block  Patient position: supine  Prep: ChloraPrep  Patient monitoring: cardiac monitor, continuous pulse ox, frequent blood pressure checks and IV access  Block type: TAP  Laterality: bilateral  Injection technique: single-shot  Procedures: ultrasound guided  Local infiltration: lidocaine  Infiltration strength: 1 %  Dose: 3 mL  Provider prep: mask and sterile gloves  Local infiltration: lidocaine  Needle  Needle gauge: 21 G  Needle length: 10 cm  Needle localization: ultrasound guidance  Test dose: negative  Assessment  Injection assessment: negative aspiration for heme, no paresthesia on injection and local visualized surrounding nerve on ultrasound  Paresthesia pain: none  Slow fractionated injection: yes  Hemodynamics: stable  Additional Notes  U/S 76803.  (1) Under ultrasound guidance, a 21 gauge needle was inserted and placed in close proximity to the TAP nerves.  (2) Ultrasound was also used to visualize the spread of the anesthetic in close proximity to the nerve being blocked. (3) The nerve appeared anatomically normal, and (4 there were no apparent abnormal pathological findings on the image that were readily visible and related to the nerve being blocked. (5) A permanent ultrasound image was saved in the patient's record.           Medications Administered  Bupivacaine (MARCAINE) PF injection 0.5%, 40 mL  Reason for block: post-op pain management and at surgeon's request

## 2020-11-17 NOTE — PROGRESS NOTES
Dr Dilia Hayward called for po pain medication as patient rates pain a 5 on scale 0-10, order received

## 2020-11-18 ENCOUNTER — TELEPHONE (OUTPATIENT)
Dept: INFECTIOUS DISEASES | Age: 50
End: 2020-11-18

## 2020-11-18 LAB — SURGICAL PATHOLOGY REPORT: NORMAL

## 2020-11-18 NOTE — OP NOTE
Operative Note      Patient: James Thorpe  YOB: 1970  MRN: [id number]    Date of Procedure: 11/17/2020    Pre-Op Diagnosis: Hx of acute cholecystitis, s/p cholecystotomy tube insertion    Post-Op Diagnosis: Chronic  Cholecystitis        Procedure:  1. Removal of 8Fr percutaneous cholecystostomy tube   2. Robotic assisted laparoscopic cholecystectomy     Surgeon: Pat Stahl IV, DO    Assistant:   Randall Dobbs DO, PGY-5  Conchis Pedraza DO, PGY-1    Anesthesia: General    Estimated Blood Loss (mL): 87AQ    Complications: None    Specimens: Gallbladder and contents     Findings: Well formed tract from cholecystostomy tube. Tube removed without issue. Chronic inflammatory changes. Critical view obtained. See details below. INDICATIONS:    This is a 45year old male who was hospitalized in September 2020 with RLL pneumonia and abscess, after recently being treated for COVID-19 in July. In September we were consulted due to the patient developing acute cholecystitis and at that time the decision was made to percutaneously drain the gallbladder in IR due to his acute pulmonary illness. Since that time the patient has recovered from a pulmonary and biliary standpoint and we ultimately recommended interval robotic cholecystectomy with removal of the percutaneous drain at that time. The risks, benefits, and alternatives were discussed with the patient. The risks including but not limited to bleeding, infection, scarring, injury to near by organs or the common bile duct, the need for future re-operation, bile leak, post operative pain, cardiopulmonary complications and complications of anesthesia. Perioperative preparation was discussed and all questions were answered. The patient expressed understanding of the intervention and consent was obtained with an RN witness. DESCRIPTION OF PROCEDURE:    The patient was taken to the OR and placed in the supine position.  General endotracheal shoulder. The infundibulum of the gallbladder was grasped with the fenestrated bipolar grasper and the gallbladder was positioned so that the cystic duct would likely be positioned at a right angle to the common bile duct in order to expose Calot's triangle. The peritoneum between the gallbladder and the liver was taken down by electrocautery to further mobilize the lower third of the gallbladder from the cystic plate and facilitate exposure of the hepatocystic triangle. The fibrous tissue was hooked with meticulous electrocautery. Hook electrocautery was used to identify the cystic duct. The cystic duct was skeletonized with electrocautery. Dissection was continued to locate the cystic artery. The cystic artery was skeletonized with electrocautery. Critical view was obtained in the anterior and posterior windows. All fat and fibrous tissue was dissected from the cystohepatic triangle with careful hook electrocautery. The cystic plate of the liver was skeletonized with hook electrocautery. Both the cystic duct and cystic artery were the only structures visualized entering into the gallbladder. Firefly was used to confirm identification of the cystic duct and common bile duct. The critical view of safety was confirmed and representative photographs were obtained with the [address]. The cystic duct was ligated with double Hemolock clips remaining on the cystic duct stump, and then divided with Endoshears. The cystic artery was then ligated and divided between two Hemolock clips. The gallbladder was dissected from the liver bed with electrocautery and we did ultimately discover another posterior branch of the cystic artery which we skeletonized and divided between two hemolock clips. Hemostasis was achieved throughout the dissection with cautery. The gallbladder was then placed into the Endopouch to be delivered through the left upper quadrant incision.  Once again the gallbladder fossa was inspected

## 2020-11-19 ENCOUNTER — HOSPITAL ENCOUNTER (OUTPATIENT)
Dept: CT IMAGING | Age: 50
Discharge: HOME | End: 2020-11-21
Payer: MEDICAID

## 2020-11-19 ENCOUNTER — OFFICE VISIT (OUTPATIENT)
Dept: FAMILY MEDICINE CLINIC | Age: 50
End: 2020-11-19
Payer: MEDICAID

## 2020-11-19 VITALS
HEART RATE: 78 BPM | DIASTOLIC BLOOD PRESSURE: 90 MMHG | SYSTOLIC BLOOD PRESSURE: 145 MMHG | WEIGHT: 216.4 LBS | TEMPERATURE: 98.4 F | BODY MASS INDEX: 31.96 KG/M2

## 2020-11-19 PROCEDURE — 99213 OFFICE O/P EST LOW 20 MIN: CPT | Performed by: STUDENT IN AN ORGANIZED HEALTH CARE EDUCATION/TRAINING PROGRAM

## 2020-11-19 PROCEDURE — 3046F HEMOGLOBIN A1C LEVEL >9.0%: CPT | Performed by: STUDENT IN AN ORGANIZED HEALTH CARE EDUCATION/TRAINING PROGRAM

## 2020-11-19 PROCEDURE — 3017F COLORECTAL CA SCREEN DOC REV: CPT | Performed by: STUDENT IN AN ORGANIZED HEALTH CARE EDUCATION/TRAINING PROGRAM

## 2020-11-19 PROCEDURE — 1036F TOBACCO NON-USER: CPT | Performed by: STUDENT IN AN ORGANIZED HEALTH CARE EDUCATION/TRAINING PROGRAM

## 2020-11-19 PROCEDURE — G8417 CALC BMI ABV UP PARAM F/U: HCPCS | Performed by: STUDENT IN AN ORGANIZED HEALTH CARE EDUCATION/TRAINING PROGRAM

## 2020-11-19 PROCEDURE — 99211 OFF/OP EST MAY X REQ PHY/QHP: CPT | Performed by: STUDENT IN AN ORGANIZED HEALTH CARE EDUCATION/TRAINING PROGRAM

## 2020-11-19 PROCEDURE — 71250 CT THORAX DX C-: CPT

## 2020-11-19 PROCEDURE — G8484 FLU IMMUNIZE NO ADMIN: HCPCS | Performed by: STUDENT IN AN ORGANIZED HEALTH CARE EDUCATION/TRAINING PROGRAM

## 2020-11-19 PROCEDURE — G8427 DOCREV CUR MEDS BY ELIG CLIN: HCPCS | Performed by: STUDENT IN AN ORGANIZED HEALTH CARE EDUCATION/TRAINING PROGRAM

## 2020-11-19 PROCEDURE — 2022F DILAT RTA XM EVC RTNOPTHY: CPT | Performed by: STUDENT IN AN ORGANIZED HEALTH CARE EDUCATION/TRAINING PROGRAM

## 2020-11-19 RX ORDER — INSULIN GLARGINE 100 [IU]/ML
12 INJECTION, SOLUTION SUBCUTANEOUS 2 TIMES DAILY
Qty: 1 VIAL | Status: CANCELLED | OUTPATIENT
Start: 2020-11-19

## 2020-11-19 RX ORDER — INSULIN GLARGINE 100 [IU]/ML
28 INJECTION, SOLUTION SUBCUTANEOUS NIGHTLY
Qty: 5 PEN | Refills: 3 | Status: SHIPPED | OUTPATIENT
Start: 2020-11-19 | End: 2021-06-25

## 2020-11-19 RX ORDER — LANCETS 30 GAUGE
1 EACH MISCELLANEOUS 4 TIMES DAILY
Qty: 600 EACH | Refills: 1 | Status: SHIPPED | OUTPATIENT
Start: 2020-11-19 | End: 2021-06-25

## 2020-11-19 RX ORDER — INSULIN LISPRO 100 [IU]/ML
2-6 INJECTION, SOLUTION INTRAVENOUS; SUBCUTANEOUS
Qty: 5 PEN | Refills: 1 | Status: SHIPPED | OUTPATIENT
Start: 2020-11-19

## 2020-11-19 RX ORDER — GLUCOSAMINE HCL/CHONDROITIN SU 500-400 MG
CAPSULE ORAL
Qty: 150 STRIP | Refills: 3 | Status: SHIPPED | OUTPATIENT
Start: 2020-11-19 | End: 2021-06-25 | Stop reason: SDUPTHER

## 2020-11-19 RX ORDER — 0.9 % SODIUM CHLORIDE 0.9 %
80 INTRAVENOUS SOLUTION INTRAVENOUS ONCE
Status: DISCONTINUED | OUTPATIENT
Start: 2020-11-19 | End: 2020-11-22 | Stop reason: HOSPADM

## 2020-11-19 RX ORDER — SODIUM CHLORIDE 0.9 % (FLUSH) 0.9 %
10 SYRINGE (ML) INJECTION PRN
Status: DISCONTINUED | OUTPATIENT
Start: 2020-11-19 | End: 2020-11-22 | Stop reason: HOSPADM

## 2020-11-19 ASSESSMENT — ENCOUNTER SYMPTOMS
VOMITING: 0
COUGH: 0
RHINORRHEA: 0
ABDOMINAL PAIN: 1
DIARRHEA: 0
SORE THROAT: 0
WHEEZING: 0
NAUSEA: 0
SHORTNESS OF BREATH: 0

## 2020-11-19 NOTE — PROGRESS NOTES
Diabetic visit information    BP Readings from Last 3 Encounters:   11/17/20 (!) 157/95   11/17/20 (!) 172/116   11/05/20 134/80       Hemoglobin A1C (%)   Date Value   11/05/2020 9.3   09/23/2020 13.3 (H)     Microalb/Crt. Ratio (mcg/mg creat)   Date Value   11/05/2020 117 (H)               Have you changed or started any medications since your last visit including any over-the-counter medicines, vitamins, or herbal medicines? no   Have you stopped taking any of your medications? Is so, why? -  no  Are you having any side effects from any of your medications? - no    Have you seen any other physician or provider since your last visit?  no   Have you had any other diagnostic tests since your last visit?  no   Have you been seen in the emergency room and/or had an admission in a hospital since we last saw you?  no     Have you had your annual diabetic retinal (eye) exam? No   (ensure copy of exam is in the chart)    Have you had your routine dental cleaning in the past 6 months? no    Do you have an active [employer] account? If not, what are your barriers? No:     Patient Care Team:  Pedro Hoffmann MD as PCP - General (Family Medicine)  Marlena Caraballo MD as Consulting Physician (Infectious Diseases)    Medical history Review  Past Medical, Family, and Social History reviewed and does not contribute to the patient presenting condition.     Health Maintenance   Topic Date Due    Diabetic retinal exam  07/03/1979    Lipid screen  07/03/1979    Shingles Vaccine (1 of 2) 07/03/2019    Colon cancer screen colonoscopy  07/03/2019    Flu vaccine (1) 09/01/2020    Hepatitis B vaccine (2 of 3 - Risk 3-dose series) 12/03/2020    A1C test (Diabetic or Prediabetic)  02/05/2021    Diabetic microalbuminuria test  11/05/2021    Diabetic foot exam  11/06/2021    DTaP/Tdap/Td vaccine (2 - Td) 11/05/2030    Pneumococcal 0-64 years Vaccine  Completed    HIV screen  Completed    Hepatitis A vaccine  Aged C/ Romel Jesus 19 Hib vaccine  Aged Out    Meningococcal (ACWY) vaccine  Aged Out

## 2020-11-19 NOTE — PROGRESS NOTES
I have reviewed and discussed key elements of [address] with the resident including plan of care and follow up and agree with the care ty plan.

## 2020-11-19 NOTE — PROGRESS NOTES
Fab Fitzgerald 44    Family Medicine Residency Program - Outpatient Note      Subjective:    Nehemiah Swann is a 45 y.o. male with  has a past medical history of Cholelithiasis, COVID-19, Diabetes mellitus ([address] Lung abscess ([address] MRSA (methicillin resistant staph aureus) culture positive, Pneumonia, RUQ pain, and Snores. Presented to the office today for:  Chief Complaint   Patient presents with    Diabetes     patient states he is doing well. had gallbladder surgery       HPI    Patient is type II diabetic on insulin. Patient has been using Lantus 12 units twice daily. Patient states his sugars in the morning have been 200 or greater. We discussed increasing Lantus at this time to 28 units once a day. This represents an increase of 4 units from previous dosing. Patient will also continue on insulin sliding scale with Humalog. We will convert all insulin to pens. Patient denies any signs symptoms of hyperglycemia at this time, no polyuria or polydipsia. Patient had a recent cholecystectomy, denies any fevers or chills, does have some mild abdominal tenderness but is passing gas and having bowel movements. Patient had slightly elevated blood pressure at today's visit, but states that he is in pain from surgery. We will recheck at next visit. Review of Systems   Constitutional: Negative for chills, diaphoresis and fever. HENT: Negative for rhinorrhea and sore throat. Respiratory: Negative for cough, shortness of breath and wheezing. Cardiovascular: Negative for chest pain, palpitations and leg swelling. Gastrointestinal: Positive for abdominal pain. Negative for diarrhea, nausea and vomiting. Genitourinary: Negative for difficulty urinating and dysuria. Neurological: Negative for dizziness, light-headedness and headaches.                  The patient has a   Family History   Problem Relation Age of Onset    Diabetes Mother     Diabetes Father     Diabetes Sister  Diabetes Brother     Diabetes Brother     Diabetes Brother        Objective:    BP (!) 151/96 (Site: Left Upper Arm, Position: Sitting, Cuff Size: Large Adult)   Pulse 78   Temp 98.4 °F (36.9 °C) (Temporal)   Wt 216 lb 6.4 oz (98.2 kg)   BMI 31.96 kg/m²    BP Readings from Last 3 Encounters:   11/19/20 (!) 151/96   11/17/20 (!) 157/95   11/17/20 (!) 172/116       Physical Exam  Vitals signs and nursing note reviewed. Constitutional:       Appearance: Normal appearance. HENT:      Head: Normocephalic and atraumatic. Eyes:      Extraocular Movements: Extraocular movements intact. Conjunctiva/sclera: Conjunctivae normal.   Cardiovascular:      Rate and Rhythm: Normal rate and regular rhythm. Pulses: Normal pulses. Heart sounds: Normal heart sounds. No murmur. No friction rub. No gallop. Pulmonary:      Effort: Pulmonary effort is normal.      Breath sounds: Normal breath sounds. Abdominal:      General: Bowel sounds are normal. There is no distension. Palpations: Abdomen is soft. Tenderness: There is no abdominal tenderness. There is no guarding. Comments: There are 4 postoperative scars on patient's abdomen from laparoscopic cholecystectomy. They are clean dry and intact. No signs of infection. Musculoskeletal:      Right lower leg: No edema. Left lower leg: No edema. Neurological:      General: No focal deficit present. Mental Status: He is alert.          Lab Results   Component Value Date    WBC 6.2 10/13/2020    HGB 12.6 (L) 10/13/2020    HCT 40.0 (L) 10/13/2020     10/13/2020    ALT 8 10/13/2020    AST 15 10/13/2020     10/13/2020    K 4.2 10/13/2020     10/13/2020    CREATININE 1.11 10/13/2020    BUN 14 10/13/2020    CO2 21 10/13/2020    INR 1.3 09/23/2020    LABA1C 9.3 11/05/2020    LABMICR 117 (H) 11/05/2020     Lab Results   Component Value Date    CALCIUM 9.2 10/13/2020     No results found for: LDLCALC, LDLCHOLESTEROL, LDLDIRECT    Assessment and Plan:    1. Type 2 diabetes mellitus with diabetic neuropathy, with long-term current use of insulin (HCC)  -We will increase insulin to 28 units, but will change regimen to only once daily of Lantus. We will continue low-dose insulin sliding scale at this time. We will follow-up in 1 month, patient will bring blood glucose log. May consider increasing Lantus again at that time, may also consider changing to a medium dose sliding scale. - Lancets MISC; 1 each by Does not apply route 4 times daily  Dispense: 600 each; Refill: 1  - insulin glargine (LANTUS SOLOSTAR) 100 UNIT/ML injection pen; Inject 28 Units into the skin nightly  Dispense: 5 pen; Refill: 3  - insulin lispro, 1 Unit Dial, (HUMALOG KWIKPEN) 100 UNIT/ML SOPN; Inject 2-6 Units into the skin 3 times daily (before meals) **Corrective Low Dose Algorithm** Glucose: Dose:  No Insulin (140-199 1 Unit) (200-249 2 Units) (250-299 3 Units) (300-349 4 Units) (350-399 5 Units) (Over 399 6 Units)  Dispense: 5 pen; Refill: 1  - Insulin Pen Needle 31G X 6 MM MISC; 1 each by Does not apply route daily  Dispense: 150 each; Refill: 3  - Alcohol prep pad  - blood glucose monitor strips; Test 4 times a day & as needed for symptoms of irregular blood glucose. Dispense sufficient amount for indicated testing frequency plus additional to accommodate PRN testing needs. Dispense: 150 strip; Refill: 3          Requested Prescriptions     Pending Prescriptions Disp Refills    insulin glargine (LANTUS) 100 UNIT/ML injection vial 1 vial      Sig: Inject 12 Units into the skin 2 times daily       There are no discontinued medications. Ned received counseling on the following healthy behaviors: nutrition, exercise and medication adherence    Discussed use,benefit, and side effects of prescribed medications. Barriers to medication compliance addressed. All patient questions answered. Pt voiced understanding.      No follow-ups on

## 2020-11-23 ENCOUNTER — OFFICE VISIT (OUTPATIENT)
Dept: INFECTIOUS DISEASES | Age: 50
End: 2020-11-23
Payer: MEDICAID

## 2020-11-23 VITALS
RESPIRATION RATE: 18 BRPM | DIASTOLIC BLOOD PRESSURE: 99 MMHG | OXYGEN SATURATION: 98 % | TEMPERATURE: 98.8 F | SYSTOLIC BLOOD PRESSURE: 145 MMHG | HEART RATE: 81 BPM | WEIGHT: 212.4 LBS | BODY MASS INDEX: 31.46 KG/M2 | HEIGHT: 69 IN

## 2020-11-23 PROCEDURE — 3017F COLORECTAL CA SCREEN DOC REV: CPT | Performed by: INTERNAL MEDICINE

## 2020-11-23 PROCEDURE — 99214 OFFICE O/P EST MOD 30 MIN: CPT | Performed by: INTERNAL MEDICINE

## 2020-11-23 PROCEDURE — G8417 CALC BMI ABV UP PARAM F/U: HCPCS | Performed by: INTERNAL MEDICINE

## 2020-11-23 PROCEDURE — G8484 FLU IMMUNIZE NO ADMIN: HCPCS | Performed by: INTERNAL MEDICINE

## 2020-11-23 PROCEDURE — 1036F TOBACCO NON-USER: CPT | Performed by: INTERNAL MEDICINE

## 2020-11-23 PROCEDURE — G8427 DOCREV CUR MEDS BY ELIG CLIN: HCPCS | Performed by: INTERNAL MEDICINE

## 2020-11-23 NOTE — PROGRESS NOTES
[facility]  Office Progress Note  Today's Date and Time: 11/23/2020, 4:20 PM    Impression:     1. Abscess of lower lobe of right lung with pneumonia ([facility] [address]    2. Acute cholecystitis    3. Cholecystostomy care ([facility] [address]    4. S/P laparoscopic cholecystectomy         Recommendations   · I reviewed the CT scan of the chest with the patient and I compared the prior images to the current CAT scan images. · Clearly there has been significant improvement in the abscess cavity. · The patient has been off antimicrobial therapy for 3 weeks and that does not seem to be any clinical deterioration or signs of recurrent infection. · At this point in time I do feel that the lung abscess is resolved and the patient can follow-up with me on an as-needed basis. I have ordered the following medications/ labs:  No orders of the defined types were placed in this encounter. No orders of the defined types were placed in this encounter. Chief complaint/reason for consultation:     Chief Complaint   Patient presents with    Pneumonia     [facility]        History of Present Illness:   Tico Mccrary is a 45y.o.-year-old male who I am seeing in follow-up for a right lower lobe lung abscess. The patient was recently seen in the office November 4 and had completed a 4-week course of IV antimicrobial therapy with ceftaroline and fluconazole. He did not have any respiratory complaints at that time and the plan was to follow-up with a CT scan of the chest to assess for resolution of the pulmonary abscess. The patient did since undergo a laparoscopic cholecystectomy last week and he reports that that went well and he does not have any untoward issues. He continues to feel well in terms of his respiration with no cough or shortness of breath.   He does report some mild postoperative abdominal pain but does not have any nausea vomiting or diarrhea    I have personally reviewedthe past medical history, medications, social history, and I have updated the database accordingly. Past Medical History:     Past Medical History:   Diagnosis Date    Cholelithiasis     and cholecystitis    COVID-19 7/2020-8/2020    hospitalized at [address] Diabetes mellitus [facility])     Lung abscess ([address] 09/2020    seeing ID, see note 11/4/2020    MRSA (methicillin resistant staph aureus) culture positive     surveillance cultures, nares 9/21/2020 at [address]    Pneumonia 09/2020    at [address] pain 2020    \" gall stones \", has drain in place currently    Snores     states had negative sleep study     Medications:     Current Outpatient Medications   Medication Sig Dispense Refill    Lancets MISC 1 each by Does not apply route 4 times daily 600 each 1    insulin glargine (LANTUS SOLOSTAR) 100 UNIT/ML injection pen Inject 28 Units into the skin nightly 5 pen 3    insulin lispro, 1 Unit Dial, (HUMALOG KWIKPEN) 100 UNIT/ML SOPN Inject 2-6 Units into the skin 3 times daily (before meals) **Corrective Low Dose Algorithm** Glucose: Dose:  No Insulin (140-199 1 Unit) (200-249 2 Units) (250-299 3 Units) (300-349 4 Units) (350-399 5 Units) (Over 399 6 Units) 5 pen 1    Insulin Pen Needle 31G X 6 MM MISC 1 each by Does not apply route daily 150 each 3    blood glucose monitor strips Test 4 times a day & as needed for symptoms of irregular blood glucose. Dispense sufficient amount for indicated testing frequency plus additional to accommodate PRN testing needs. 150 strip 3    docusate sodium (COLACE) 100 MG capsule Take 1 capsule by mouth 2 times daily 30 capsule 1    Insulin Syringe-Needle U-100 ([employer] INSULIN SYRINGE) 31G X 5/16\" 1 ML MISC 1 each by Does not apply route 4 times daily 120 each 3    blood glucose monitor kit and supplies Dispense sufficient amount for indicated testing frequency plus additional to accommodate PRN testing needs.  Dispense all needed supplies to include: monitor, strips, lancing device, lancets, control solutions, alcohol swabs. 1 kit 0    Syringe/Needle, Disp, (SYRINGE 3CC/25GX1\") 25G X 1\" 3 ML MISC 1 each by Does not apply route 4 times daily 120 each 2    Lancets MISC 1 each by Does not apply route 4 times daily 600 each 1    blood glucose monitor strips Test 4 times a day & as needed for symptoms of irregular blood glucose. Dispense sufficient amount for indicated testing frequency plus additional to accommodate PRN testing needs. 120 strip 3    insulin lispro (HUMALOG) 100 UNIT/ML injection vial Inject 2-10 Units into the skin 2 times daily as needed (per sliding scale) States scale starts at blood sugar of 150      albuterol sulfate HFA (VENTOLIN HFA) 108 (90 Base) MCG/ACT inhaler Inhale 2 puffs into the lungs every 6 hours as needed for Wheezing      ibuprofen (ADVIL;MOTRIN) 800 MG tablet Take 800 mg by mouth every 8 hours as needed for Pain      metFORMIN (GLUCOPHAGE) 500 MG tablet Take 500 mg by mouth 2 times daily (with meals)       No current facility-administered medications for this visit. Allergies:   Sulfa antibiotics     Review of Systems:   Review of Systems     Physical Examination :   BP (!) 145/99   Pulse 81   Temp 98.8 °F (37.1 °C) (Temporal)   Resp 18   Ht 5' 9\" (1.753 m)   Wt 212 lb 6.4 oz (96.3 kg)   SpO2 98% Comment: room air at rest  BMI 31.37 kg/m²     Physical Exam  Constitutional:       Appearance: He is well-developed. He is obese. HENT:      Head: Normocephalic and atraumatic. Neck:      Musculoskeletal: Normal range of motion and neck supple. Cardiovascular:      Rate and Rhythm: Normal rate. Heart sounds: Normal heart sounds. No friction rub. No gallop. Pulmonary:      Effort: Pulmonary effort is normal.      Breath sounds: Normal breath sounds. No wheezing. Abdominal:      General: Bowel sounds are normal.      Palpations: Abdomen is soft. There is no mass. Tenderness: There is no abdominal tenderness. Comments: The prior laparoscopic port sites are closed and there is some glue still visible. Musculoskeletal: Normal range of motion. Lymphadenopathy:      Cervical: No cervical adenopathy. Skin:     General: Skin is warm and dry. Neurological:      Mental Status: He is alert and oriented to person, place, and time. Laboratory studies :  Medical Decision Making:   I have independently reviewed the following labs:  CBC with Differential:  Lab Results   Component Value Date    WBC 6.2 10/13/2020    WBC 9.8 09/26/2020    HGB 12.6 10/13/2020    HGB 12.2 09/26/2020    HCT 40.0 10/13/2020    HCT 39.3 09/26/2020     10/13/2020     09/26/2020    LYMPHOPCT 38 10/13/2020    LYMPHOPCT 41 09/25/2020    MONOPCT 5 10/13/2020    MONOPCT 7 09/25/2020       BMP:  Lab Results   Component Value Date     10/13/2020     09/26/2020    K 4.2 10/13/2020    K 4.0 09/26/2020     10/13/2020     09/26/2020    CO2 21 10/13/2020    CO2 26 09/26/2020    BUN 14 10/13/2020    BUN 9 09/26/2020    CREATININE 1.11 10/13/2020    CREATININE 1.17 09/26/2020       Hepatic Function Panel:   Lab Results   Component Value Date    PROT 7.9 10/13/2020    LABALBU 3.6 10/13/2020    BILIDIR 0.13 10/13/2020    IBILI 0.56 10/13/2020    BILITOT 0.69 10/13/2020    ALKPHOS 71 10/13/2020    ALT 8 10/13/2020    AST 15 10/13/2020       No results found for: CRP  No results found for: SEDRATE      Imaging Studies:   CT OF THE CHEST WITHOUT CONTRAST 11/19/2020 5:03 pm    Impression    1. Near complete resolution of the right lower lobe lung abscess now replaced    by a bandlike subpleural opacity. 2. Overall improved bilateral ground-glass opacities which likely represents    resolving COVID-19 pneumonitis.  Recommend imaging follow-up imaging in 3-6    months to ensure resolution per Fleischner criteria. Thank you for allowing us to participate in the care of this patient.  Pleasecall with questions. Aurelia Valencia MD  [employer] messaging: [phone]    This note is created with the assistance of a speech recognition program.  While intending to generate a document that actually reflects the content ofthe visit, the document can still have some errors including those of syntax and sound a like substitutions which may escape proof reading. It such instances, actual meaning can be extrapolated by contextual diversion.

## 2020-11-24 RX ORDER — UBIQUINOL 100 MG
1 CAPSULE ORAL 4 TIMES DAILY
Qty: 100 EACH | Refills: 11 | Status: SHIPPED | OUTPATIENT
Start: 2020-11-24 | End: 2021-06-25 | Stop reason: SDUPTHER

## 2020-11-24 RX ORDER — GLUCOSAMINE HCL/CHONDROITIN SU 500-400 MG
CAPSULE ORAL
Qty: 120 STRIP | Refills: 3 | Status: SHIPPED | OUTPATIENT
Start: 2020-11-24 | End: 2021-06-25 | Stop reason: SDUPTHER

## 2020-11-24 NOTE — TELEPHONE ENCOUNTER
[facility] Request for pending medications. Last Visit Date: 11/19/2020  Next Visit Date:  Future Appointments   Date Time Provider Shahab Miller   12/15/2020  2:30 PM August Hdez MD [facility] Maintenance   Topic Date Due    Diabetic retinal exam  07[birth year]03/1979    Lipid screen  07/03/1979    Shingles Vaccine (1 of 2) 07/03/2019    Colon cancer screen colonoscopy  07/03/2019    Flu vaccine (1) 09/01/2020    Hepatitis B vaccine (2 of 3 - Risk 3-dose series) 12/03/2020    A1C test (Diabetic or Prediabetic)  02/05/2021    Diabetic microalbuminuria test  11/05/2021    Diabetic foot exam  11/06/2021    DTaP/Tdap/Td vaccine (2 - Td) 11/05/2030    HIV screen  Completed    Hepatitis A vaccine  Aged Out    Hib vaccine  Aged Out    Meningococcal (ACWY) vaccine  Aged Out    Pneumococcal 0-64 years Vaccine  Aged Out       Hemoglobin A1C (%)   Date Value   11/05/2020 9.3   09/23/2020 13.3 (H)             ( goal A1C is < 7)   Microalb/Crt.  Ratio (mcg/mg creat)   Date Value   11/05/2020 117 (H)     No results found for: LDLCHOLESTEROL, LDLCALC    (goal LDL is <100)   AST (U/L)   Date Value   10/13/2020 15     ALT (U/L)   Date Value   10/13/2020 8     BUN (mg/dL)   Date Value   10/13/2020 14     BP Readings from Last 3 Encounters:   11/23/20 (!) 145/99   11/19/20 (!) 145/90   11/17/20 (!) 157/95          (goal 120/80)    All Future Testing planned in [facility]  Lab Frequency Next Occurrence   Lipid Panel Once 05/05/2022   Cologuard (For External Results Only) Once 11/10/2021       Next Visit Date:  Future Appointments   Date Time Provider Shahab Miller   12/15/2020  2:30 PM August Hdez MD [address] [facility]         Patient Active Problem List:     Abscess of lower lobe of right lung with pneumonia [facility])     Acute cholecystitis     Multifocal pneumonia     Obesity due to excess calories     Diabetes mellitus type 2 in obese ([address]     At high risk for tuberculosis infection     Trigger thumb of left hand

## 2020-12-03 ENCOUNTER — TELEPHONE (OUTPATIENT)
Dept: FAMILY MEDICINE CLINIC | Age: 50
End: 2020-12-03

## 2020-12-03 NOTE — TELEPHONE ENCOUNTER
[facility] pharmacy sent over RX alternative request stated that the trueplus pen needle 31G*1/4 Sig use 4 times daily with insulin  dated 11/24/2020, insurance wont cover, please send alternative. Thank you.

## 2020-12-31 ENCOUNTER — TELEPHONE (OUTPATIENT)
Dept: FAMILY MEDICINE CLINIC | Age: 50
End: 2020-12-31

## 2020-12-31 NOTE — TELEPHONE ENCOUNTER
Called patient and spoke to him to inform them of their negative cologuard results. While speaking to patient, he mentioned her was out of his Metformin and needed refill. Request sent to PCP and medication has been filled. Patient also stated he has not been able to take his insulin due to not having pen needles due to them being unavailable. Writer called pharmacy and confirmed that due to patient insurance only a specific brand ( Tech Lite 31G X 6mm ) can be ordered and the pharmacy is having a hard time getting them. Pharmacy suggested calling other pharmacy to attempt to get them. Writer called [employer], and they had a box of what patient needs and they will hold for patient. Writer had Dr. Tima Arrieta send the script since he was in office, and the need was urgent. Left message for patient letting him know that both scripts were addressed and that he needs to pick his needles up at [facility].

## 2021-01-27 ENCOUNTER — TELEPHONE (OUTPATIENT)
Dept: FAMILY MEDICINE CLINIC | Age: 51
End: 2021-01-27

## 2021-01-27 NOTE — TELEPHONE ENCOUNTER
Pt called stating that pt needs RTW letter for surgery that pt had back in Oct 20, please call pt back to advise

## 2021-01-28 NOTE — TELEPHONE ENCOUNTER
Patient called and informed return to [facility] note is completed and he may come pick it up.

## 2021-06-10 NOTE — TELEPHONE ENCOUNTER
CLINICAL PHARMACY NOTE: MEDS TO BEDS    Total # of Prescriptions Filled: 1   The following medications were delivered to the patient:  · Metoprolol succinate er 50 mg    Additional Documentation: CT Dept at [facility] is asking if the CT chest can be done with IV contrast for a better evaluation? If you agree, sign the order I pended and I'll cancel the other CT order.  Let me know, Thanks

## 2021-06-25 ENCOUNTER — OFFICE VISIT (OUTPATIENT)
Dept: FAMILY MEDICINE CLINIC | Age: 51
End: 2021-06-25
Payer: MEDICAID

## 2021-06-25 ENCOUNTER — HOSPITAL ENCOUNTER (OUTPATIENT)
Age: 51
Setting detail: SPECIMEN
Discharge: HOME | End: 2021-06-25
Payer: MEDICAID

## 2021-06-25 VITALS
HEART RATE: 81 BPM | BODY MASS INDEX: 28.88 KG/M2 | SYSTOLIC BLOOD PRESSURE: 139 MMHG | DIASTOLIC BLOOD PRESSURE: 88 MMHG | HEIGHT: 69 IN | WEIGHT: 195 LBS

## 2021-06-25 DIAGNOSIS — E11.40 TYPE 2 DIABETES MELLITUS WITH DIABETIC NEUROPATHY, WITH LONG-TERM CURRENT USE OF INSULIN (HCC): ICD-10-CM

## 2021-06-25 DIAGNOSIS — E11.40 TYPE 2 DIABETES MELLITUS WITH DIABETIC NEUROPATHY, WITH LONG-TERM CURRENT USE OF INSULIN (HCC): Primary | ICD-10-CM

## 2021-06-25 DIAGNOSIS — Z79.4 TYPE 2 DIABETES MELLITUS WITH DIABETIC NEUROPATHY, WITH LONG-TERM CURRENT USE OF INSULIN (HCC): Primary | ICD-10-CM

## 2021-06-25 DIAGNOSIS — Z79.4 TYPE 2 DIABETES MELLITUS WITH DIABETIC NEUROPATHY, WITH LONG-TERM CURRENT USE OF INSULIN (HCC): ICD-10-CM

## 2021-06-25 LAB
CHOLESTEROL/HDL RATIO: 7.6
CHOLESTEROL: 289 MG/DL
HBA1C MFR BLD: 14 %
HDLC SERPL-MCNC: 38 MG/DL
LDL CHOLESTEROL DIRECT: 185 MG/DL
LDL CHOLESTEROL: ABNORMAL MG/DL (ref 0–130)
TRIGL SERPL-MCNC: 405 MG/DL
VLDLC SERPL CALC-MCNC: ABNORMAL MG/DL (ref 1–30)

## 2021-06-25 PROCEDURE — G8417 CALC BMI ABV UP PARAM F/U: HCPCS | Performed by: STUDENT IN AN ORGANIZED HEALTH CARE EDUCATION/TRAINING PROGRAM

## 2021-06-25 PROCEDURE — 2022F DILAT RTA XM EVC RTNOPTHY: CPT | Performed by: STUDENT IN AN ORGANIZED HEALTH CARE EDUCATION/TRAINING PROGRAM

## 2021-06-25 PROCEDURE — 83036 HEMOGLOBIN GLYCOSYLATED A1C: CPT | Performed by: STUDENT IN AN ORGANIZED HEALTH CARE EDUCATION/TRAINING PROGRAM

## 2021-06-25 PROCEDURE — 3017F COLORECTAL CA SCREEN DOC REV: CPT | Performed by: STUDENT IN AN ORGANIZED HEALTH CARE EDUCATION/TRAINING PROGRAM

## 2021-06-25 PROCEDURE — G8427 DOCREV CUR MEDS BY ELIG CLIN: HCPCS | Performed by: STUDENT IN AN ORGANIZED HEALTH CARE EDUCATION/TRAINING PROGRAM

## 2021-06-25 PROCEDURE — 1036F TOBACCO NON-USER: CPT | Performed by: STUDENT IN AN ORGANIZED HEALTH CARE EDUCATION/TRAINING PROGRAM

## 2021-06-25 PROCEDURE — 99213 OFFICE O/P EST LOW 20 MIN: CPT | Performed by: STUDENT IN AN ORGANIZED HEALTH CARE EDUCATION/TRAINING PROGRAM

## 2021-06-25 PROCEDURE — 3046F HEMOGLOBIN A1C LEVEL >9.0%: CPT | Performed by: STUDENT IN AN ORGANIZED HEALTH CARE EDUCATION/TRAINING PROGRAM

## 2021-06-25 RX ORDER — LISINOPRIL 5 MG/1
5 TABLET ORAL DAILY
Qty: 30 TABLET | Refills: 1 | Status: SHIPPED | OUTPATIENT
Start: 2021-06-25 | End: 2021-06-25

## 2021-06-25 RX ORDER — INSULIN GLARGINE 100 [IU]/ML
INJECTION, SOLUTION SUBCUTANEOUS
Qty: 5 PEN | Refills: 1 | Status: SHIPPED | OUTPATIENT
Start: 2021-06-25 | End: 2021-10-29

## 2021-06-25 RX ORDER — GLUCOSAMINE HCL/CHONDROITIN SU 500-400 MG
CAPSULE ORAL
Qty: 120 STRIP | Refills: 3 | Status: SHIPPED | OUTPATIENT
Start: 2021-06-25 | End: 2022-06-24 | Stop reason: SDUPTHER

## 2021-06-25 RX ORDER — UBIQUINOL 100 MG
1 CAPSULE ORAL 4 TIMES DAILY
Qty: 100 EACH | Refills: 11 | Status: SHIPPED | OUTPATIENT
Start: 2021-06-25

## 2021-06-25 RX ORDER — FLASH GLUCOSE SENSOR
2 KIT MISCELLANEOUS
Qty: 2 EACH | Refills: 1 | Status: SHIPPED | OUTPATIENT
Start: 2021-06-25

## 2021-06-25 RX ORDER — FLASH GLUCOSE SCANNING READER
1 EACH MISCELLANEOUS DAILY
Qty: 1 EACH | Refills: 0 | Status: SHIPPED | OUTPATIENT
Start: 2021-06-25

## 2021-06-25 RX ORDER — LANCETS 30 GAUGE
1 EACH MISCELLANEOUS 4 TIMES DAILY
Qty: 600 EACH | Refills: 1 | Status: SHIPPED | OUTPATIENT
Start: 2021-06-25 | End: 2022-06-24 | Stop reason: SDUPTHER

## 2021-06-25 ASSESSMENT — ENCOUNTER SYMPTOMS
BACK PAIN: 0
CONSTIPATION: 0
VOMITING: 0
NAUSEA: 0
DIARRHEA: 0
WHEEZING: 0
RHINORRHEA: 0
SORE THROAT: 0
ABDOMINAL PAIN: 0
COUGH: 0
SHORTNESS OF BREATH: 0

## 2021-06-25 ASSESSMENT — PATIENT HEALTH QUESTIONNAIRE - PHQ9
1. LITTLE INTEREST OR PLEASURE IN DOING THINGS: 0
SUM OF ALL RESPONSES TO PHQ QUESTIONS 1-9: 0
SUM OF ALL RESPONSES TO PHQ9 QUESTIONS 1 & 2: 0
SUM OF ALL RESPONSES TO PHQ QUESTIONS 1-9: 0
SUM OF ALL RESPONSES TO PHQ QUESTIONS 1-9: 0
2. FEELING DOWN, DEPRESSED OR HOPELESS: 0

## 2021-06-25 NOTE — PROGRESS NOTES
Visit Information    Have you changed or started any medications since your last visit including any over-the-counter medicines, vitamins, or herbal medicines? no   Are you having any side effects from any of your medications? -  no  Have you stopped taking any of your medications? Is so, why? -  no    Have you seen any other physician or provider since your last visit? No  Have you had any other diagnostic tests since your last visit? No  Have you been seen in the emergency room and/or had an admission to a hospital since we last saw you? No  Have you had your routine dental cleaning in the past 6 months? no    Have you activated your [employer] account? If not, what are your barriers?  No: declined     Patient Care Team:  Roman Bennett MD as PCP - General (Family Medicine)  Sharan Danielle MD as Consulting Physician (Infectious Diseases)    Medical History Review  Past Medical, Family, and Social History reviewed and does not contribute to the patient presenting condition    Health Maintenance   Topic Date Due    Hepatitis C screen  Never done    Diabetic retinal exam  Never done    Lipid screen  Never done    Shingles Vaccine (1 of 2) Never done    Hepatitis B vaccine (2 of 3 - Risk 3-dose series) 12/03/2020    A1C test (Diabetic or Prediabetic)  02/05/2021    COVID-19 Vaccine (2 - Pfizer 2-dose series) 06/26/2021    Flu vaccine (Season Ended) 09/01/2021    Diabetic microalbuminuria test  11/05/2021    Diabetic foot exam  11/06/2021    Colon cancer screen fecal DNA test (Cologuard)  12/21/2023    DTaP/Tdap/Td vaccine (2 - Td or Tdap) 11/05/2030    Pneumococcal 0-64 years Vaccine (2 of 2) 07/03/2034    HIV screen  Completed    Hepatitis A vaccine  Aged Out    Hib vaccine  Aged Out    Meningococcal (ACWY) vaccine  Aged Out

## 2021-06-25 NOTE — PROGRESS NOTES
Attending Physician Statement  I have discussed the care of Elly Esqueda, 45 y.o. male,including pertinent history and exam findings,  with the resident Dr. David Song MD.  History:  Chief Complaint   Patient presents with    Diabetes     f/u, A1C due    Letter for School/Work     needs visits since covid printed out     Patient is here for follow up on type II DM. He denies any other issues or concerns. I have reviewed the key elements of the encounter with the resident. Examination was done by resident as documented in residents note. BP Readings from Last 3 Encounters:   06/25/21 139/88   11/23/20 (!) 145/99   11/19/20 (!) 145/90     /88 (Site: Right Upper Arm, Position: Sitting, Cuff Size: Medium Adult)   Pulse 81   Ht 5' 9\" (1.753 m)   Wt 195 lb (88.5 kg)   BMI 28.80 kg/m²   Lab Results   Component Value Date    WBC 6.2 10/13/2020    HGB 12.6 (L) 10/13/2020    HCT 40.0 (L) 10/13/2020     10/13/2020    ALT 8 10/13/2020    AST 15 10/13/2020     10/13/2020    K 4.2 10/13/2020     10/13/2020    CREATININE 1.11 10/13/2020    BUN 14 10/13/2020    CO2 21 10/13/2020    INR 1.3 09/23/2020    LABA1C 14.0 (A) 06/25/2021    LABMICR 117 (H) 11/05/2020     Lab Results   Component Value Date    CALCIUM 9.2 10/13/2020     No results found for: LDLCALC, LDLCHOLESTEROL, LDLDIRECT  I agree with the assessment, plan and diagnosis of    Diagnosis Orders   1.  Type 2 diabetes mellitus with diabetic neuropathy, with long-term current use of insulin (HCC)  POCT glycosylated hemoglobin (Hb A1C)    Insulin Pen Needle 31G X 6 MM MISC    metFORMIN (GLUCOPHAGE) 1000 MG tablet    insulin glargine (LANTUS SOLOSTAR) 100 UNIT/ML injection pen    Lipid Panel    blood glucose monitor strips    Continuous Blood Gluc  (FREESTYLE ELIECER 2 READER) ANAM    Continuous Blood Gluc Sensor (FREESTYLE ELIECER 2 SENSOR) MISC    Lancets MISC    Alcohol Swabs (ALCOHOL PREP) 70 % PADS     I agree with  orders as documented by the resident. Recommendations:   She was counseled, given A1c is greater than 14, insulin regimen was increased patient advised to continue glucose monitoring and follow-up in 2 weeks. Diabetic supplies refilled and labs updated. Recommend diabetes education further diabetic diet and glucose monitoring. Return in about 2 weeks (around 7/9/2021) for Diabetes.    (Niki Benavides ) Dr. Roberto Hager MD

## 2021-07-13 ENCOUNTER — TELEPHONE (OUTPATIENT)
Dept: FAMILY MEDICINE CLINIC | Age: 51
End: 2021-07-13

## 2021-07-13 NOTE — TELEPHONE ENCOUNTER
Writer placed telephone call to patient in attempts to reschedule missed appointment on 07/12/21 with Georgia Chauhan message left to call office and reschedule. Message # 2.

## 2021-10-28 DIAGNOSIS — E11.40 TYPE 2 DIABETES MELLITUS WITH DIABETIC NEUROPATHY, WITH LONG-TERM CURRENT USE OF INSULIN (HCC): ICD-10-CM

## 2021-10-28 DIAGNOSIS — Z79.4 TYPE 2 DIABETES MELLITUS WITH DIABETIC NEUROPATHY, WITH LONG-TERM CURRENT USE OF INSULIN (HCC): ICD-10-CM

## 2021-10-29 RX ORDER — INSULIN GLARGINE 100 [IU]/ML
INJECTION, SOLUTION SUBCUTANEOUS
Qty: 5 PEN | Refills: 1 | Status: SHIPPED | OUTPATIENT
Start: 2021-10-29 | End: 2022-06-24

## 2021-10-29 NOTE — TELEPHONE ENCOUNTER
Escribe Request for pending medication. Last Visit Date:6/25/21  Next Visit Date:  No future appointments. Health Maintenance   Topic Date Due    Hepatitis C screen  Never done    Diabetic retinal exam  Never done    Shingles Vaccine (1 of 2) Never done    Hepatitis B vaccine (2 of 3 - Risk 3-dose series) 12/03/2020    COVID-19 Vaccine (2 - Pfizer 3-dose booster series) 06/26/2021    Flu vaccine (1) Never done    A1C test (Diabetic or Prediabetic)  09/25/2021    Diabetic microalbuminuria test  11/05/2021    Diabetic foot exam  11/06/2021    Lipid screen  06/25/2022    Colon cancer screen fecal DNA test (Cologuard)  12/21/2023    DTaP/Tdap/Td vaccine (2 - Td or Tdap) 11/05/2030    Pneumococcal 0-64 years Vaccine (2 of 2 - PPSV23) 07/03/2034    HIV screen  Completed    Hepatitis A vaccine  Aged Out    Hib vaccine  Aged Out    Meningococcal (ACWY) vaccine  Aged Out       Hemoglobin A1C (%)   Date Value   06/25/2021 14.0 (A)   11/05/2020 9.3   09/23/2020 13.3 (H)             ( goal A1C is < 7)   Microalb/Crt. Ratio (mcg/mg creat)   Date Value   11/05/2020 117 (H)     LDL Cholesterol (mg/dL)   Date Value   06/25/2021            (goal LDL is <100)   AST (U/L)   Date Value   10/13/2020 15     ALT (U/L)   Date Value   10/13/2020 8     BUN (mg/dL)   Date Value   10/13/2020 14     BP Readings from Last 3 Encounters:   06/25/21 139/88   11/23/20 (!) 145/99   11/19/20 (!) 145/90          (goal 120/80)    All Future Testing planned in [facility]      Next Visit Date:  No future appointments.       Patient Active Problem List:     Abscess of lower lobe of right lung with pneumonia ([address]     Acute cholecystitis     Multifocal pneumonia     Obesity due to excess calories     Diabetes mellitus type 2 in obese ([address]     At high risk for tuberculosis infection     Trigger thumb of left hand

## 2022-06-21 ENCOUNTER — NURSE TRIAGE (OUTPATIENT)
Dept: OTHER | Facility: CLINIC | Age: 52
End: 2022-06-21

## 2022-06-21 NOTE — TELEPHONE ENCOUNTER
Received call from Salvatore Cifuentes at [facility] with [facility]. Subjective: Caller states \"injured knee\"     Current Symptoms: twisted left knee while pushing a car. Is getting worse. Is stiff. Denies swelling, redness or warmth. Limping with walking but no numbness or tingling. Onset: 3 days ago; worsening    Associated Symptoms: NA    Pain Severity: 8/10; pressure like; constant    Temperature: no fever     What has been tried: Nirav Guillory    LMP: NA Pregnant: NA    Recommended disposition: Go to ED/UCC Now (Or to Office with PCP Approval)    Care advice provided, patient verbalizes understanding; denies any other questions or concerns; instructed to call back for any new or worsening symptoms. Writer provided warm transfer to JESICA Eden at [facility] for second level triage     Attention Provider: Thank you for allowing me to participate in the care of your patient. The patient was connected to triage in response to information provided to the ECC/PSC. Please do not respond through this encounter as the response is not directed to a shared pool.           Reason for Disposition   SEVERE pain (e.g., excruciating pain, unable to do any normal activities)    Protocols used: LEG INJURY-ADULT-OH

## 2022-06-24 ENCOUNTER — OFFICE VISIT (OUTPATIENT)
Dept: FAMILY MEDICINE CLINIC | Age: 52
End: 2022-06-24
Payer: MEDICARE

## 2022-06-24 VITALS
DIASTOLIC BLOOD PRESSURE: 78 MMHG | HEIGHT: 69 IN | HEART RATE: 78 BPM | TEMPERATURE: 97 F | SYSTOLIC BLOOD PRESSURE: 122 MMHG | WEIGHT: 160.4 LBS | BODY MASS INDEX: 23.76 KG/M2

## 2022-06-24 DIAGNOSIS — Z13.220 SCREENING FOR HYPERLIPIDEMIA: ICD-10-CM

## 2022-06-24 DIAGNOSIS — Z23 NEED FOR PROPHYLACTIC VACCINATION AGAINST STREPTOCOCCUS PNEUMONIAE (PNEUMOCOCCUS): ICD-10-CM

## 2022-06-24 DIAGNOSIS — Z79.4 TYPE 2 DIABETES MELLITUS WITH DIABETIC NEUROPATHY, WITH LONG-TERM CURRENT USE OF INSULIN (HCC): Primary | ICD-10-CM

## 2022-06-24 DIAGNOSIS — M25.562 ACUTE PAIN OF LEFT KNEE: ICD-10-CM

## 2022-06-24 DIAGNOSIS — Z11.59 NEED FOR HEPATITIS C SCREENING TEST: ICD-10-CM

## 2022-06-24 DIAGNOSIS — E11.40 TYPE 2 DIABETES MELLITUS WITH DIABETIC NEUROPATHY, WITH LONG-TERM CURRENT USE OF INSULIN (HCC): Primary | ICD-10-CM

## 2022-06-24 LAB — HBA1C MFR BLD: 13.8 %

## 2022-06-24 PROCEDURE — 3046F HEMOGLOBIN A1C LEVEL >9.0%: CPT | Performed by: STUDENT IN AN ORGANIZED HEALTH CARE EDUCATION/TRAINING PROGRAM

## 2022-06-24 PROCEDURE — 1036F TOBACCO NON-USER: CPT | Performed by: STUDENT IN AN ORGANIZED HEALTH CARE EDUCATION/TRAINING PROGRAM

## 2022-06-24 PROCEDURE — 99213 OFFICE O/P EST LOW 20 MIN: CPT | Performed by: STUDENT IN AN ORGANIZED HEALTH CARE EDUCATION/TRAINING PROGRAM

## 2022-06-24 PROCEDURE — 3017F COLORECTAL CA SCREEN DOC REV: CPT | Performed by: STUDENT IN AN ORGANIZED HEALTH CARE EDUCATION/TRAINING PROGRAM

## 2022-06-24 PROCEDURE — 90746 HEPB VACCINE 3 DOSE ADULT IM: CPT | Performed by: FAMILY MEDICINE

## 2022-06-24 PROCEDURE — 99211 OFF/OP EST MAY X REQ PHY/QHP: CPT | Performed by: STUDENT IN AN ORGANIZED HEALTH CARE EDUCATION/TRAINING PROGRAM

## 2022-06-24 PROCEDURE — 90732 PPSV23 VACC 2 YRS+ SUBQ/IM: CPT | Performed by: FAMILY MEDICINE

## 2022-06-24 PROCEDURE — 83036 HEMOGLOBIN GLYCOSYLATED A1C: CPT | Performed by: STUDENT IN AN ORGANIZED HEALTH CARE EDUCATION/TRAINING PROGRAM

## 2022-06-24 PROCEDURE — G8420 CALC BMI NORM PARAMETERS: HCPCS | Performed by: STUDENT IN AN ORGANIZED HEALTH CARE EDUCATION/TRAINING PROGRAM

## 2022-06-24 PROCEDURE — 2022F DILAT RTA XM EVC RTNOPTHY: CPT | Performed by: STUDENT IN AN ORGANIZED HEALTH CARE EDUCATION/TRAINING PROGRAM

## 2022-06-24 PROCEDURE — G8427 DOCREV CUR MEDS BY ELIG CLIN: HCPCS | Performed by: STUDENT IN AN ORGANIZED HEALTH CARE EDUCATION/TRAINING PROGRAM

## 2022-06-24 RX ORDER — SYRING-NEEDL,DISP,INSUL,0.3 ML 30 GX5/16"
1 SYRINGE, EMPTY DISPOSABLE MISCELLANEOUS ONCE
Qty: 1 EACH | Refills: 0 | Status: SHIPPED | OUTPATIENT
Start: 2022-06-24 | End: 2022-06-24

## 2022-06-24 RX ORDER — ATORVASTATIN CALCIUM 40 MG/1
40 TABLET, FILM COATED ORAL NIGHTLY
Qty: 30 TABLET | Refills: 1 | Status: SHIPPED | OUTPATIENT
Start: 2022-06-24 | End: 2022-07-18

## 2022-06-24 RX ORDER — ALBUTEROL SULFATE 90 UG/1
2 AEROSOL, METERED RESPIRATORY (INHALATION) EVERY 6 HOURS PRN
Qty: 18 G | Refills: 1 | Status: SHIPPED | OUTPATIENT
Start: 2022-06-24

## 2022-06-24 RX ORDER — INSULIN GLARGINE 100 [IU]/ML
INJECTION, SOLUTION SUBCUTANEOUS
Qty: 5 PEN | Refills: 1 | Status: SHIPPED | OUTPATIENT
Start: 2022-06-24 | End: 2022-08-09

## 2022-06-24 RX ORDER — LANCETS 30 GAUGE
1 EACH MISCELLANEOUS 4 TIMES DAILY
Qty: 100 EACH | Refills: 1 | Status: SHIPPED | OUTPATIENT
Start: 2022-06-24 | End: 2022-08-15

## 2022-06-24 RX ORDER — GLUCOSAMINE HCL/CHONDROITIN SU 500-400 MG
CAPSULE ORAL
Qty: 120 STRIP | Refills: 3 | Status: SHIPPED | OUTPATIENT
Start: 2022-06-24 | End: 2022-09-29

## 2022-06-24 ASSESSMENT — ENCOUNTER SYMPTOMS
DIARRHEA: 0
ABDOMINAL PAIN: 0
COUGH: 0
SHORTNESS OF BREATH: 0
NAUSEA: 0
VOMITING: 0
WHEEZING: 0

## 2022-06-24 ASSESSMENT — PATIENT HEALTH QUESTIONNAIRE - PHQ9
SUM OF ALL RESPONSES TO PHQ QUESTIONS 1-9: 0
2. FEELING DOWN, DEPRESSED OR HOPELESS: 0
1. LITTLE INTEREST OR PLEASURE IN DOING THINGS: 0
SUM OF ALL RESPONSES TO PHQ QUESTIONS 1-9: 0
SUM OF ALL RESPONSES TO PHQ9 QUESTIONS 1 & 2: 0

## 2022-06-24 NOTE — PROGRESS NOTES
I have reviewed and discussed key elements of [address] [place] [address] with the resident including plan of care and follow up and agree with the care ty plan. Diagnosis Orders   1. Type 2 diabetes mellitus with diabetic neuropathy, with long-term current use of insulin (HCC)  Microalbumin, Ur    POCT glycosylated hemoglobin (Hb A1C)    insulin glargine (LANTUS SOLOSTAR) 100 UNIT/ML injection pen    Insulin Pen Needle 31G X 6 MM [facility] Primary Care Pharmacist    CBC with Auto Differential    Comprehensive Metabolic Panel    metFORMIN (GLUCOPHAGE) 500 MG tablet    blood glucose monitor strips    Lancets MISC    Alcohol prep pad    DISCONTINUED: metFORMIN (GLUCOPHAGE) 1000 MG tablet   2. Acute pain of left knee  William Steven DO, Sports Medicine and Primary Care  [place]   3. Screening for hyperlipidemia  Lipid Panel   4. Need for hepatitis C screening test  Hepatitis C Antibody   5.  Need for prophylactic vaccination against Streptococcus pneumoniae (pneumococcus)  Pneumococcal polysaccharide vaccine 23-valent PPSV23

## 2022-06-24 NOTE — PROGRESS NOTES
Diabetic visit information    BP Readings from Last 3 Encounters:   06/24/22 122/78   06/25/21 139/88   11/23/20 (!) 145/99       Hemoglobin A1C (%)   Date Value   06/25/2021 14.0 (A)   11/05/2020 9.3   09/23/2020 13.3 (H)     Microalb/Crt. Ratio (mcg/mg creat)   Date Value   11/05/2020 117 (H)     LDL Cholesterol (mg/dL)   Date Value   06/25/2021                    Have you changed or started any medications since your last visit including any over-the-counter medicines, vitamins, or herbal medicines? no   Have you stopped taking any of your medications? Is so, why? -  no  Are you having any side effects from any of your medications? - no    Have you seen any other physician or provider since your last visit?  no   Have you had any other diagnostic tests since your last visit?  no   Have you been seen in the emergency room and/or had an admission in a hospital since we last saw you?  no     Have you had your annual diabetic retinal (eye) exam? No   (ensure copy of exam is in the chart)    Have you had your routine dental cleaning in the past 6 months? no    Do you have an active MyChart account? If not, what are your barriers? No: declined    Patient Care Team:  Mallorie Ocasio MD as PCP - General (Family Medicine)  Jordy Brown MD as Consulting Physician (Infectious Diseases)    Medical history Review  Past Medical, Family, and Social History reviewed and does not contribute to the patient presenting condition.     Health Maintenance   Topic Date Due    Diabetic retinal exam  Never done    Hepatitis C screen  Never done    Shingles vaccine (1 of 2) Never done    Hepatitis B vaccine (2 of 3 - Risk 3-dose series) 12/03/2020    COVID-19 Vaccine (2 - Pfizer 3-dose series) 06/26/2021    A1C test (Diabetic or Prediabetic)  09/25/2021    Diabetic microalbuminuria test  11/05/2021    Pneumococcal 0-64 years Vaccine (2 - PCV) 11/05/2021    Diabetic foot exam  11/06/2021    Lipids  06/25/2022    Depression Screen  06/25/2022    Flu vaccine (Season Ended) 09/01/2022    Colorectal Cancer Screen  12/21/2023    DTaP/Tdap/Td vaccine (2 - Td or Tdap) 11/05/2030    HIV screen  Completed    Hepatitis A vaccine  Aged Out    Hib vaccine  Aged Out    Meningococcal (ACWY) vaccine  Aged Out

## 2022-06-24 NOTE — PATIENT INSTRUCTIONS
Thank you for letting us take care of you today. We hope all your questions were addressed. If a question was overlooked or something else comes to mind after you return home, please contact a member of your Care Team listed below. Your Care Team at [place] [address] is Team #2  Sherrill Hamm DO (Faculty)  Juani Chatman (Faculty)  Silverio Mcmahon MD (Resident)  Edwena Pallas, MD (Resident)  Houston Hill MD (Resident)  Shameka Chandra MD (Resident)  Karen Blue., MARILYN Fermin.,  JESICA Lang., GUIDO Oro., [facility] office)  Candy Lopes (Clinical Practice Manager)  Moraima Nunn [facility] (Clinical Pharmacist)     Office phone number: [phone]    If you need to get in right away due to illness, please be advised we have \"Same Day\" appointments available Monday-Friday. Please call us at [phone] option #3 to schedule your \"Same Day\" appointment.

## 2022-06-24 NOTE — PROGRESS NOTES
[address] [facility] Medicine Residency Program - Outpatient Note        Benjamin Burnham is a 45 y.o. male  presented to the office on 06/24/22:        T2DM very uncontrolled, pt was in [facility], has not been taking insulin or metformin. Denies vision changes, polyuria, polydipsia. Will restart lantus at 30 units nightly, refill metformin but start at 500 BID and increase within 1 month. Refer to outpatient pharmacy. Pt understands how to inject insulin. Knee pain, twisting injury, constant pain, sharp pain, when standing from sitting. No catching or locking, pain since Saturday. Has given out on him. Diagnosis Orders   1. Type 2 diabetes mellitus with diabetic neuropathy, with long-term current use of insulin (HCC)  Microalbumin, Ur    POCT glycosylated hemoglobin (Hb A1C)    insulin glargine (LANTUS SOLOSTAR) 100 UNIT/ML injection pen    Insulin Pen Needle 31G X 6 MM [facility] Primary Care Pharmacist    CBC with Auto Differential    Comprehensive Metabolic Panel    metFORMIN (GLUCOPHAGE) 500 MG tablet    blood glucose monitor strips    Lancets MISC    Alcohol prep pad    DISCONTINUED: metFORMIN (GLUCOPHAGE) 1000 MG tablet   2. Acute pain of left knee  Yunier Kaiser, DO William, Sports Medicine and Primary Care  [place]   3. Screening for hyperlipidemia  Lipid Panel   4. Need for hepatitis C screening test  Hepatitis C Antibody       Plan:  1. Restart insulin, metformin, follow up 4 weeks  2. Knee pain concern for meniscus injury, refer to sports medicine           Review of Systems   Constitutional: Negative for fever. Eyes: Negative for visual disturbance. Respiratory: Negative for cough, shortness of breath and wheezing. Gastrointestinal: Negative for abdominal pain, diarrhea, nausea and vomiting. Endocrine: Negative for polydipsia and polyuria. Genitourinary: Negative for difficulty urinating and dysuria.    Neurological: Positive for numbness (bilateral feet).           Vitals:    06/24/22 1505   BP: 122/78   Pulse: 78   Temp: 97 °F (36.1 °C)             Physical Exam  Vitals reviewed. Eyes:      Extraocular Movements: Extraocular movements intact. Conjunctiva/sclera: Conjunctivae normal.   Cardiovascular:      Rate and Rhythm: Normal rate and regular rhythm. Pulses: Normal pulses. Heart sounds: Normal heart sounds. Pulmonary:      Effort: Pulmonary effort is normal.      Breath sounds: Normal breath sounds. No wheezing or rales. Abdominal:      General: Bowel sounds are normal.      Palpations: Abdomen is soft. Tenderness: There is no abdominal tenderness. Musculoskeletal:      Comments: Left knee no effusion noted  No tenderness to palpation. Pain with internal rotation of left lower extremity, no valgus or varus laxity, no anterior or posterior laxity   Neurological:      Mental Status: He is alert. Return in about 31 days (around 7/25/2022) for Diabetes. Reg Ponce MD  Family Medicine PGY-3  06/24/22 at 3:22 PM

## 2022-07-11 ENCOUNTER — TELEPHONE (OUTPATIENT)
Dept: ORTHOPEDIC SURGERY | Age: 52
End: 2022-07-11

## 2022-07-11 NOTE — TELEPHONE ENCOUNTER
Pt had NEW PT appt w/ Dr Naman Price on 7/18 - due to Dr Yesenia Lind no longer being part of this [facility] practice - appt jelly w/Gisella Lowe w/new appt info left for pt with time chg and advised if update does not work - to call back to emerson/bb

## 2022-07-15 DIAGNOSIS — M25.562 LEFT KNEE PAIN, UNSPECIFIED CHRONICITY: Primary | ICD-10-CM

## 2022-07-17 DIAGNOSIS — E11.40 TYPE 2 DIABETES MELLITUS WITH DIABETIC NEUROPATHY, WITH LONG-TERM CURRENT USE OF INSULIN (HCC): ICD-10-CM

## 2022-07-17 DIAGNOSIS — Z79.4 TYPE 2 DIABETES MELLITUS WITH DIABETIC NEUROPATHY, WITH LONG-TERM CURRENT USE OF INSULIN (HCC): ICD-10-CM

## 2022-07-18 RX ORDER — ATORVASTATIN CALCIUM 40 MG/1
TABLET, FILM COATED ORAL
Qty: 30 TABLET | Refills: 1 | Status: SHIPPED | OUTPATIENT
Start: 2022-07-18 | End: 2022-08-09

## 2022-07-18 NOTE — TELEPHONE ENCOUNTER
E-scribe request for med refills. Please review and e-scribe if applicable. Last Visit Date:  06/24/2022  Next Visit Date:  7/22/2022    Hemoglobin A1C (%)   Date Value   06/24/2022 13.8   06/25/2021 14.0 (A)   11/05/2020 9.3             ( goal A1C is < 7)   Microalb/Crt.  Ratio (mcg/mg creat)   Date Value   11/05/2020 117 (H)     LDL Cholesterol (mg/dL)   Date Value   06/25/2021            (goal LDL is <100)   AST (U/L)   Date Value   10/13/2020 15     ALT (U/L)   Date Value   10/13/2020 8     BUN (mg/dL)   Date Value   10/13/2020 14     BP Readings from Last 3 Encounters:   06/24/22 122/78   06/25/21 139/88   11/23/20 (!) 145/99          (goal 120/80)        Patient Active Problem List:     Abscess of lower lobe of right lung with pneumonia ([address]     Acute cholecystitis     Multifocal pneumonia     Obesity due to excess calories     Diabetes mellitus type 2 in obese ([address]     At high risk for tuberculosis infection     Trigger thumb of left hand      ----Rochelle Lux

## 2022-08-09 DIAGNOSIS — Z79.4 TYPE 2 DIABETES MELLITUS WITH DIABETIC NEUROPATHY, WITH LONG-TERM CURRENT USE OF INSULIN (HCC): ICD-10-CM

## 2022-08-09 DIAGNOSIS — E11.40 TYPE 2 DIABETES MELLITUS WITH DIABETIC NEUROPATHY, WITH LONG-TERM CURRENT USE OF INSULIN (HCC): ICD-10-CM

## 2022-08-09 RX ORDER — ATORVASTATIN CALCIUM 40 MG/1
TABLET, FILM COATED ORAL
Qty: 30 TABLET | Refills: 1 | Status: SHIPPED | OUTPATIENT
Start: 2022-08-09 | End: 2022-09-06

## 2022-08-09 RX ORDER — INSULIN GLARGINE 100 [IU]/ML
INJECTION, SOLUTION SUBCUTANEOUS
Qty: 15 PEN | Refills: 1 | Status: SHIPPED | OUTPATIENT
Start: 2022-08-09 | End: 2022-09-06 | Stop reason: SDUPTHER

## 2022-08-09 NOTE — TELEPHONE ENCOUNTER
Last visit: 6/24/22  Last Med refill: 7/18/22  Does patient have enough medication for 72 hours: Yes    Next Visit Date:  No future appointments. Health Maintenance   Topic Date Due    Diabetic retinal exam  Never done    Hepatitis C screen  Never done    Shingles vaccine (1 of 2) Never done    COVID-19 Vaccine (2 - Pfizer series) 06/26/2021    Diabetic microalbuminuria test  11/05/2021    Diabetic foot exam  11/06/2021    Lipids  06/25/2022    Flu vaccine (1) 09/01/2022    A1C test (Diabetic or Prediabetic)  09/24/2022    Hepatitis B vaccine (3 of 3 - Risk 3-dose series) 10/24/2022    Depression Screen  06/24/2023    Pneumococcal 0-64 years Vaccine (2 - PCV) 06/24/2023    Colorectal Cancer Screen  12/21/2023    DTaP/Tdap/Td vaccine (2 - Td or Tdap) 11/05/2030    HIV screen  Completed    Hepatitis A vaccine  Aged Out    Hib vaccine  Aged Out    Meningococcal (ACWY) vaccine  Aged Out       Hemoglobin A1C (%)   Date Value   06/24/2022 13.8   06/25/2021 14.0 (A)   11/05/2020 9.3             ( goal A1C is < 7)   Microalb/Crt.  Ratio (mcg/mg creat)   Date Value   11/05/2020 117 (H)     LDL Cholesterol (mg/dL)   Date Value   06/25/2021            (goal LDL is <100)   AST (U/L)   Date Value   10/13/2020 15     ALT (U/L)   Date Value   10/13/2020 8     BUN (mg/dL)   Date Value   10/13/2020 14     BP Readings from Last 3 Encounters:   06/24/22 122/78   06/25/21 139/88   11/23/20 (!) 145/99          (goal 120/80)    All Future Testing planned in [facility]  Lab Frequency Next Occurrence   Microalbumin, Ur Once 07/24/2022   Lipid Panel Once 07/24/2022   CBC with Auto Differential Once 06/24/2022   Comprehensive Metabolic Panel Once 53/97/8601   Hepatitis C Antibody Once 06/24/2022   XR KNEE LEFT (MIN 4 VIEWS) Once 07/15/2022               Patient Active Problem List:     Abscess of lower lobe of right lung with pneumonia ([facility] [address]     Acute cholecystitis     Multifocal pneumonia     Obesity due to excess calories     Diabetes mellitus type 2 in obese (HCC)     At high risk for tuberculosis infection     Trigger thumb of left hand

## 2022-08-09 NOTE — TELEPHONE ENCOUNTER
Last visit: 6/24/22  Last Med refill: 7/24/22  Does patient have enough medication for 72 hours: Yes    Next Visit Date:  No future appointments. Health Maintenance   Topic Date Due    Diabetic retinal exam  Never done    Hepatitis C screen  Never done    Shingles vaccine (1 of 2) Never done    COVID-19 Vaccine (2 - Pfizer series) 06/26/2021    Diabetic microalbuminuria test  11/05/2021    Diabetic foot exam  11/06/2021    Lipids  06/25/2022    Flu vaccine (1) 09/01/2022    A1C test (Diabetic or Prediabetic)  09/24/2022    Hepatitis B vaccine (3 of 3 - Risk 3-dose series) 10/24/2022    Depression Screen  06/24/2023    Pneumococcal 0-64 years Vaccine (2 - PCV) 06/24/2023    Colorectal Cancer Screen  12/21/2023    DTaP/Tdap/Td vaccine (2 - Td or Tdap) 11/05/2030    HIV screen  Completed    Hepatitis A vaccine  Aged Out    Hib vaccine  Aged Out    Meningococcal (ACWY) vaccine  Aged Out       Hemoglobin A1C (%)   Date Value   06/24/2022 13.8   06/25/2021 14.0 (A)   11/05/2020 9.3             ( goal A1C is < 7)   Microalb/Crt.  Ratio (mcg/mg creat)   Date Value   11/05/2020 117 (H)     LDL Cholesterol (mg/dL)   Date Value   06/25/2021            (goal LDL is <100)   AST (U/L)   Date Value   10/13/2020 15     ALT (U/L)   Date Value   10/13/2020 8     BUN (mg/dL)   Date Value   10/13/2020 14     BP Readings from Last 3 Encounters:   06/24/22 122/78   06/25/21 139/88   11/23/20 (!) 145/99          (goal 120/80)    All Future Testing planned in [facility]  Lab Frequency Next Occurrence   Microalbumin, Ur Once 07/24/2022   Lipid Panel Once 07/24/2022   CBC with Auto Differential Once 06/24/2022   Comprehensive Metabolic Panel Once 07/06/9606   Hepatitis C Antibody Once 06/24/2022   XR KNEE LEFT (MIN 4 VIEWS) Once 07/15/2022               Patient Active Problem List:     Abscess of lower lobe of right lung with pneumonia ([facility] [address]     Acute cholecystitis     Multifocal pneumonia     Obesity due to excess calories     Diabetes mellitus type 2 in obese (HCC)     At high risk for tuberculosis infection     Trigger thumb of left hand

## 2022-08-13 DIAGNOSIS — Z79.4 TYPE 2 DIABETES MELLITUS WITH DIABETIC NEUROPATHY, WITH LONG-TERM CURRENT USE OF INSULIN (HCC): ICD-10-CM

## 2022-08-13 DIAGNOSIS — E11.40 TYPE 2 DIABETES MELLITUS WITH DIABETIC NEUROPATHY, WITH LONG-TERM CURRENT USE OF INSULIN (HCC): ICD-10-CM

## 2022-08-15 RX ORDER — LANCETS 33 GAUGE
EACH MISCELLANEOUS
Qty: 100 EACH | Refills: 1 | Status: SHIPPED | OUTPATIENT
Start: 2022-08-15 | End: 2022-10-06

## 2022-08-15 NOTE — TELEPHONE ENCOUNTER
E-scribe request for med refill. Please review and e-scribe if applicable. Last Visit Date:  06/24/2022  Next Visit Date:  Visit date not found    Hemoglobin A1C (%)   Date Value   06/24/2022 13.8   06/25/2021 14.0 (A)   11/05/2020 9.3             ( goal A1C is < 7)   Microalb/Crt.  Ratio (mcg/mg creat)   Date Value   11/05/2020 117 (H)     LDL Cholesterol (mg/dL)   Date Value   06/25/2021            (goal LDL is <100)   AST (U/L)   Date Value   10/13/2020 15     ALT (U/L)   Date Value   10/13/2020 8     BUN (mg/dL)   Date Value   10/13/2020 14     BP Readings from Last 3 Encounters:   06/24/22 122/78   06/25/21 139/88   11/23/20 (!) 145/99          (goal 120/80)        Patient Active Problem List:     Abscess of lower lobe of right lung with pneumonia ([address]     Acute cholecystitis     Multifocal pneumonia     Obesity due to excess calories     Diabetes mellitus type 2 in obese ([address]     At high risk for tuberculosis infection     Trigger thumb of left hand      ----Stacie Quezada

## 2022-08-31 ENCOUNTER — TELEPHONE (OUTPATIENT)
Dept: FAMILY MEDICINE CLINIC | Age: 52
End: 2022-08-31

## 2022-08-31 NOTE — TELEPHONE ENCOUNTER
Called patient and left a message asking patient to call me at the office to discuss his glucometer.

## 2022-09-05 DIAGNOSIS — Z79.4 TYPE 2 DIABETES MELLITUS WITH DIABETIC NEUROPATHY, WITH LONG-TERM CURRENT USE OF INSULIN (HCC): ICD-10-CM

## 2022-09-05 DIAGNOSIS — E11.40 TYPE 2 DIABETES MELLITUS WITH DIABETIC NEUROPATHY, WITH LONG-TERM CURRENT USE OF INSULIN (HCC): ICD-10-CM

## 2022-09-06 RX ORDER — INSULIN GLARGINE 100 [IU]/ML
INJECTION, SOLUTION SUBCUTANEOUS
Qty: 3 ML | Refills: 3 | Status: SHIPPED | OUTPATIENT
Start: 2022-09-06

## 2022-09-06 RX ORDER — ATORVASTATIN CALCIUM 40 MG/1
TABLET, FILM COATED ORAL
Qty: 30 TABLET | Refills: 1 | Status: SHIPPED | OUTPATIENT
Start: 2022-09-06

## 2022-09-06 RX ORDER — INSULIN GLARGINE 100 [IU]/ML
INJECTION, SOLUTION SUBCUTANEOUS
Refills: 1 | OUTPATIENT
Start: 2022-09-06

## 2022-09-06 NOTE — TELEPHONE ENCOUNTER
Last visit: 6/24/22  Last Med refill: 8/9/22  Does patient have enough medication for 72 hours: Yes    Next Visit Date:  No future appointments. Health Maintenance   Topic Date Due    Diabetic retinal exam  Never done    Hepatitis C screen  Never done    Shingles vaccine (1 of 2) Never done    COVID-19 Vaccine (2 - Pfizer series) 06/26/2021    Diabetic microalbuminuria test  11/05/2021    Diabetic foot exam  11/06/2021    Lipids  06/25/2022    Flu vaccine (1) Never done    A1C test (Diabetic or Prediabetic)  09/24/2022    Hepatitis B vaccine (3 of 3 - Risk 3-dose series) 10/24/2022    Depression Screen  06/24/2023    Pneumococcal 0-64 years Vaccine (2 - PCV) 06/24/2023    Colorectal Cancer Screen  12/21/2023    DTaP/Tdap/Td vaccine (2 - Td or Tdap) 11/05/2030    HIV screen  Completed    Hepatitis A vaccine  Aged Out    Hib vaccine  Aged Out    Meningococcal (ACWY) vaccine  Aged Out       Hemoglobin A1C (%)   Date Value   06/24/2022 13.8   06/25/2021 14.0 (A)   11/05/2020 9.3             ( goal A1C is < 7)   Microalb/Crt.  Ratio (mcg/mg creat)   Date Value   11/05/2020 117 (H)     LDL Cholesterol (mg/dL)   Date Value   06/25/2021            (goal LDL is <100)   AST (U/L)   Date Value   10/13/2020 15     ALT (U/L)   Date Value   10/13/2020 8     BUN (mg/dL)   Date Value   10/13/2020 14     BP Readings from Last 3 Encounters:   06/24/22 122/78   06/25/21 139/88   11/23/20 (!) 145/99          (goal 120/80)    All Future Testing planned in [facility]  Lab Frequency Next Occurrence   Microalbumin, Ur Once 07/24/2022   Lipid Panel Once 07/24/2022   CBC with Auto Differential Once 06/24/2022   Comprehensive Metabolic Panel Once 46/72/6555   Hepatitis C Antibody Once 06/24/2022   XR KNEE LEFT (MIN 4 VIEWS) Once 07/15/2022               Patient Active Problem List:     Abscess of lower lobe of right lung with pneumonia ([address]     Acute cholecystitis     Multifocal pneumonia     Obesity due to excess calories     Diabetes mellitus type 2 in obese (HCC)     At high risk for tuberculosis infection     Trigger thumb of left hand

## 2022-09-21 NOTE — PROGRESS NOTES
Spoke with pt. Verified pt still has prep instructions for upcoming procedure with Dr. Sainz. Pt stated they do. Pt informed surgery center will call day or two prior with arrival time. Pt verbalized understanding to all.      14 days  Dispense: 2 each; Refill: 1  - Lancets MISC; 1 each by Does not apply route 4 times daily  Dispense: 600 each; Refill: 1  - Alcohol Swabs (ALCOHOL PREP) 70 % PADS; 1 each by Does not apply route 4 times daily Use_____times per day Diagnosis: 250.0   Diabetes ___Insulin-Dependent___Non-Insulin Dependent  Dispense: 100 each; Refill: 11          Requested Prescriptions     Signed Prescriptions Disp Refills    Insulin Pen Needle 31G X 6 MM MISC 100 each 3     Si each by Does not apply route daily    metFORMIN (GLUCOPHAGE) 1000 MG tablet 60 tablet 1     Sig: Take 1 tablet by mouth 2 times daily (with meals)    insulin glargine (LANTUS SOLOSTAR) 100 UNIT/ML injection pen 5 pen 1     Si units in the evening. Increase by 2 units every 3 days until morning sugars are <130.  blood glucose monitor strips 120 strip 3     Sig: Test 4 times a day & as needed for symptoms of irregular blood glucose. Dispense sufficient amount for indicated testing frequency plus additional to accommodate PRN testing needs.  Continuous Blood Gluc  (FREESTYLE ELIECER 2 READER) ANAM 1 each 0     Si Device by Does not apply route daily    Continuous Blood Gluc Sensor (FREESTYLE ELIECER 2 SENSOR) MISC 2 each 1     Si each by Does not apply route every 14 days    Lancets MISC 600 each 1     Si each by Does not apply route 4 times daily    Alcohol Swabs (ALCOHOL PREP) 70 % PADS 100 each 11     Si each by Does not apply route 4 times daily Use_____times per day Diagnosis: 250.0   Diabetes ___Insulin-Dependent___Non-Insulin Dependent       There are no discontinued medications. No follow-ups on file. SUBJECTIVE/OBJECTIVE:      Eugenio Dominguez is a 45 y.o. Janetteashley August  has a past medical history of Cholelithiasis, COVID-19, Diabetes mellitus ([address] Lung abscess ([facility] [address] MRSA (methicillin resistant staph aureus) culture positive, Pneumonia, RUQ pain, and Snores.     HPI    T2DM with history of neuropathy no distension. Palpations: Abdomen is soft. Tenderness: There is no abdominal tenderness. There is no guarding. Neurological:      General: No focal deficit present. Mental Status: He is alert. Lab Results   Component Value Date    WBC 6.2 10/13/2020    HGB 12.6 (L) 10/13/2020    HCT 40.0 (L) 10/13/2020     10/13/2020    ALT 8 10/13/2020    AST 15 10/13/2020     10/13/2020    K 4.2 10/13/2020     10/13/2020    CREATININE 1.11 10/13/2020    BUN 14 10/13/2020    CO2 21 10/13/2020    INR 1.3 09/23/2020    LABA1C 9.3 11/05/2020    LABMICR 117 (H) 11/05/2020     Lab Results   Component Value Date    CALCIUM 9.2 10/13/2020     No results found for: LDLCALC, LDLCHOLESTEROL, LDLDIRECT        Ned received counseling on the following healthy behaviors: medication adherence    Discussed use,benefit, and side effects of prescribed medications. Barriers to medication compliance addressed. All patient questions answered. Pt voiced understanding. Reg Cruz MD  Family Medicine PGY-2  06/25/21 at 10:26 AM      Disclaimer: Some orall of this note was transcribed using voice-recognition software. This may cause typographical errors occasionally. Although all effort is made to fix these errors, please do not hesitate to contact our office if there Jesse Jose Alfredo concern with the understanding of this note. An electronic signature was used to authenticate this note.

## 2022-09-29 DIAGNOSIS — E11.40 TYPE 2 DIABETES MELLITUS WITH DIABETIC NEUROPATHY, WITH LONG-TERM CURRENT USE OF INSULIN (HCC): ICD-10-CM

## 2022-09-29 DIAGNOSIS — Z79.4 TYPE 2 DIABETES MELLITUS WITH DIABETIC NEUROPATHY, WITH LONG-TERM CURRENT USE OF INSULIN (HCC): ICD-10-CM

## 2022-09-29 RX ORDER — CALCIUM CITRATE/VITAMIN D3 200MG-6.25
TABLET ORAL
Qty: 100 STRIP | Refills: 3 | Status: SHIPPED | OUTPATIENT
Start: 2022-09-29

## 2022-09-29 NOTE — TELEPHONE ENCOUNTER
Last visit: 6/24/22  Last Med refill: 9/2022  Does patient have enough medication for 72 hours: Yes    Next Visit Date:  No future appointments. Health Maintenance   Topic Date Due    Diabetic retinal exam  Never done    Hepatitis C screen  Never done    Shingles vaccine (1 of 2) Never done    COVID-19 Vaccine (2 - Pfizer series) 06/26/2021    Diabetic microalbuminuria test  11/05/2021    Diabetic foot exam  11/06/2021    Hepatitis B vaccine (3 of 4 - 4-dose series) 06/24/2022    Lipids  06/25/2022    Flu vaccine (1) Never done    A1C test (Diabetic or Prediabetic)  09/24/2022    Depression Screen  06/24/2023    Pneumococcal 0-64 years Vaccine (2 - PCV) 06/24/2023    Colorectal Cancer Screen  12/21/2023    DTaP/Tdap/Td vaccine (2 - Td or Tdap) 11/05/2030    HIV screen  Completed    Hepatitis A vaccine  Aged Out    Hib vaccine  Aged Out    Meningococcal (ACWY) vaccine  Aged Out       Hemoglobin A1C (%)   Date Value   06/24/2022 13.8   06/25/2021 14.0 (A)   11/05/2020 9.3             ( goal A1C is < 7)   Microalb/Crt.  Ratio (mcg/mg creat)   Date Value   11/05/2020 117 (H)     LDL Cholesterol (mg/dL)   Date Value   06/25/2021            (goal LDL is <100)   AST (U/L)   Date Value   10/13/2020 15     ALT (U/L)   Date Value   10/13/2020 8     BUN (mg/dL)   Date Value   10/13/2020 14     BP Readings from Last 3 Encounters:   06/24/22 122/78   06/25/21 139/88   11/23/20 (!) 145/99          (goal 120/80)    All Future Testing planned in [facility]  Lab Frequency Next Occurrence   Microalbumin, Ur Once 07/24/2022   Lipid Panel Once 07/24/2022   CBC with Auto Differential Once 06/24/2022   Comprehensive Metabolic Panel Once 58/84/0377   Hepatitis C Antibody Once 06/24/2022   XR KNEE LEFT (MIN 4 VIEWS) Once 07/15/2022               Patient Active Problem List:     Abscess of lower lobe of right lung with pneumonia ([address]     Acute cholecystitis     Multifocal pneumonia     Obesity due to excess calories     Diabetes mellitus type 2 in obese (HCC)     At high risk for tuberculosis infection     Trigger thumb of left hand

## 2022-10-06 DIAGNOSIS — E11.40 TYPE 2 DIABETES MELLITUS WITH DIABETIC NEUROPATHY, WITH LONG-TERM CURRENT USE OF INSULIN (HCC): ICD-10-CM

## 2022-10-06 DIAGNOSIS — Z79.4 TYPE 2 DIABETES MELLITUS WITH DIABETIC NEUROPATHY, WITH LONG-TERM CURRENT USE OF INSULIN (HCC): ICD-10-CM

## 2022-10-06 RX ORDER — GLUCOSAM/CHON-MSM1/C/MANG/BOSW 500-416.6
TABLET ORAL
Qty: 100 EACH | Refills: 1 | Status: SHIPPED | OUTPATIENT
Start: 2022-10-06

## 2022-10-06 NOTE — TELEPHONE ENCOUNTER
Last visit: 6/24/22  Last Med refill: 6/24/22  Does patient have enough medication for 72 hours: No:     Next Visit Date:  No future appointments. Health Maintenance   Topic Date Due    Diabetic retinal exam  Never done    Hepatitis C screen  Never done    Shingles vaccine (1 of 2) Never done    COVID-19 Vaccine (2 - Pfizer series) 06/26/2021    Diabetic microalbuminuria test  11/05/2021    Diabetic foot exam  11/06/2021    Hepatitis B vaccine (3 of 4 - 4-dose series) 06/24/2022    Lipids  06/25/2022    Flu vaccine (1) Never done    A1C test (Diabetic or Prediabetic)  09/24/2022    Depression Screen  06/24/2023    Pneumococcal 0-64 years Vaccine (2 - PCV) 06/24/2023    Colorectal Cancer Screen  12/21/2023    DTaP/Tdap/Td vaccine (2 - Td or Tdap) 11/05/2030    HIV screen  Completed    Hepatitis A vaccine  Aged Out    Hib vaccine  Aged Out    Meningococcal (ACWY) vaccine  Aged Out       Hemoglobin A1C (%)   Date Value   06/24/2022 13.8   06/25/2021 14.0 (A)   11/05/2020 9.3             ( goal A1C is < 7)   Microalb/Crt.  Ratio (mcg/mg creat)   Date Value   11/05/2020 117 (H)     LDL Cholesterol (mg/dL)   Date Value   06/25/2021            (goal LDL is <100)   AST (U/L)   Date Value   10/13/2020 15     ALT (U/L)   Date Value   10/13/2020 8     BUN (mg/dL)   Date Value   10/13/2020 14     BP Readings from Last 3 Encounters:   06/24/22 122/78   06/25/21 139/88   11/23/20 (!) 145/99          (goal 120/80)    All Future Testing planned in [facility]  Lab Frequency Next Occurrence   Microalbumin, Ur Once 07/24/2022   Lipid Panel Once 07/24/2022   CBC with Auto Differential Once 06/24/2022   Comprehensive Metabolic Panel Once 88/44/6351   Hepatitis C Antibody Once 06/24/2022   XR KNEE LEFT (MIN 4 VIEWS) Once 07/15/2022               Patient Active Problem List:     Abscess of lower lobe of right lung with pneumonia ([address]     Acute cholecystitis     Multifocal pneumonia     Obesity due to excess calories     Diabetes mellitus type 2 in obese (HCC)     At high risk for tuberculosis infection     Trigger thumb of left hand

## 2023-10-16 DIAGNOSIS — Z12.11 COLON CANCER SCREENING: Primary | ICD-10-CM

## 2025-06-29 NOTE — TELEPHONE ENCOUNTER
mellitus type 2 in obese (HCC)     At high risk for tuberculosis infection     Trigger thumb of left hand
Him/He

## (undated) DEVICE — INSUFFLATION TUBING SET WITH FILTER, FUNNEL CONNECTOR AND LUER LOCK: Brand: JOSNOE MEDICAL INC

## (undated) DEVICE — TOWEL,OR,DSP,ST,NATURAL,DLX,4/PK,20PK/CS: Brand: MEDLINE

## (undated) DEVICE — APPLICATOR MEDICATED 26 CC SOLUTION HI LT ORNG CHLORAPREP

## (undated) DEVICE — Device

## (undated) DEVICE — TROCAR: Brand: KII FIOS FIRST ENTRY

## (undated) DEVICE — SOLUTION ANTIFOG VIS SYS CLEARIFY LAPSCP

## (undated) DEVICE — LUER-LOK 360°: Brand: CONNECTA, LUER-LOK

## (undated) DEVICE — PROTECTOR ULN NRV PUR FOAM HK LOOP STRP ANATOMICALLY

## (undated) DEVICE — CANNULA SEAL

## (undated) DEVICE — BAG SPEC REM 224ML W4XL6IN DIA10MM 1 HND GYN DISP ENDOPCH

## (undated) DEVICE — ADHESIVE SKIN CLSR 0.7ML TOP DERMBND ADV

## (undated) DEVICE — AGENT HEMSTAT 3GM OXIDIZED REGENERATED CELOS ABSRB FOR CONT (ORDER MULTIPLES OF 5EA)

## (undated) DEVICE — ARM DRAPE

## (undated) DEVICE — DEVICE TRCR 12X9X3IN WHT CLSR DISP OMNICLOSE

## (undated) DEVICE — GLOVE ORANGE PI 7 1/2   MSG9075

## (undated) DEVICE — GOWN,AURORA,NONRNF,XL,30/CS: Brand: MEDLINE

## (undated) DEVICE — GLOVE SURG SZ 65 THK91MIL LTX FREE SYN POLYISOPRENE

## (undated) DEVICE — SURGICEL ENDOSCP APPL

## (undated) DEVICE — MITT SURG PREP L ADH DISPOSABLE

## (undated) DEVICE — GLOVE ORANGE PI 7   MSG9070

## (undated) DEVICE — INSUFFLATION NEEDLE TO ESTABLISH PNEUMOPERITONEUM.: Brand: INSUFFLATION NEEDLE

## (undated) DEVICE — BLADELESS OBTURATOR: Brand: WECK VISTA

## (undated) DEVICE — SUTURE VCRL + SZ 0 L27IN ABSRB VLT L26MM UR-6 5/8 CIR VCP603H